# Patient Record
Sex: FEMALE | Race: WHITE | NOT HISPANIC OR LATINO
[De-identification: names, ages, dates, MRNs, and addresses within clinical notes are randomized per-mention and may not be internally consistent; named-entity substitution may affect disease eponyms.]

---

## 2017-02-09 ENCOUNTER — APPOINTMENT (OUTPATIENT)
Dept: HEART AND VASCULAR | Facility: CLINIC | Age: 57
End: 2017-02-09

## 2017-02-09 VITALS
RESPIRATION RATE: 12 BRPM | BODY MASS INDEX: 27.01 KG/M2 | OXYGEN SATURATION: 98 % | HEIGHT: 67 IN | TEMPERATURE: 97.8 F | HEART RATE: 82 BPM | DIASTOLIC BLOOD PRESSURE: 75 MMHG | WEIGHT: 172.1 LBS | SYSTOLIC BLOOD PRESSURE: 123 MMHG

## 2017-02-09 DIAGNOSIS — R00.2 PALPITATIONS: ICD-10-CM

## 2017-02-09 DIAGNOSIS — I83.93 ASYMPTOMATIC VARICOSE VEINS OF BILATERAL LOWER EXTREMITIES: ICD-10-CM

## 2017-09-06 ENCOUNTER — APPOINTMENT (OUTPATIENT)
Dept: HEART AND VASCULAR | Facility: CLINIC | Age: 57
End: 2017-09-06

## 2017-11-22 ENCOUNTER — APPOINTMENT (OUTPATIENT)
Dept: HEART AND VASCULAR | Facility: CLINIC | Age: 57
End: 2017-11-22

## 2018-07-03 ENCOUNTER — APPOINTMENT (OUTPATIENT)
Dept: HEART AND VASCULAR | Facility: CLINIC | Age: 58
End: 2018-07-03
Payer: COMMERCIAL

## 2018-07-03 VITALS
RESPIRATION RATE: 12 BRPM | HEIGHT: 69 IN | SYSTOLIC BLOOD PRESSURE: 100 MMHG | DIASTOLIC BLOOD PRESSURE: 70 MMHG | BODY MASS INDEX: 25.18 KG/M2 | OXYGEN SATURATION: 96 % | WEIGHT: 170 LBS | TEMPERATURE: 98.2 F | HEART RATE: 97 BPM

## 2018-07-03 DIAGNOSIS — I87.2 VENOUS INSUFFICIENCY (CHRONIC) (PERIPHERAL): ICD-10-CM

## 2018-07-03 PROCEDURE — 93000 ELECTROCARDIOGRAM COMPLETE: CPT

## 2018-07-03 PROCEDURE — 36415 COLL VENOUS BLD VENIPUNCTURE: CPT

## 2018-07-03 PROCEDURE — 99214 OFFICE O/P EST MOD 30 MIN: CPT | Mod: 25

## 2018-07-03 RX ORDER — METOPROLOL SUCCINATE 50 MG/1
50 TABLET, EXTENDED RELEASE ORAL DAILY
Qty: 90 | Refills: 1 | Status: DISCONTINUED | COMMUNITY
Start: 2017-02-09 | End: 2018-07-03

## 2018-07-09 ENCOUNTER — APPOINTMENT (OUTPATIENT)
Dept: HEART AND VASCULAR | Facility: CLINIC | Age: 58
End: 2018-07-09

## 2018-07-09 ENCOUNTER — APPOINTMENT (OUTPATIENT)
Dept: HEART AND VASCULAR | Facility: CLINIC | Age: 58
End: 2018-07-09
Payer: COMMERCIAL

## 2018-07-09 VITALS
WEIGHT: 170 LBS | TEMPERATURE: 98.4 F | HEART RATE: 95 BPM | SYSTOLIC BLOOD PRESSURE: 115 MMHG | RESPIRATION RATE: 12 BRPM | DIASTOLIC BLOOD PRESSURE: 80 MMHG | OXYGEN SATURATION: 97 % | BODY MASS INDEX: 33.38 KG/M2 | HEIGHT: 60 IN

## 2018-07-09 DIAGNOSIS — Z13.6 ENCOUNTER FOR SCREENING FOR CARDIOVASCULAR DISORDERS: ICD-10-CM

## 2018-07-09 LAB
25(OH)D3 SERPL-MCNC: 29.7 NG/ML
ALBUMIN SERPL ELPH-MCNC: 4 G/DL
ALP BLD-CCNC: 122 U/L
ALT SERPL-CCNC: 25 U/L
ANION GAP SERPL CALC-SCNC: 17 MMOL/L
APPEARANCE: CLEAR
AST SERPL-CCNC: 25 U/L
BASOPHILS # BLD AUTO: 0.04 K/UL
BASOPHILS NFR BLD AUTO: 0.6 %
BILIRUB SERPL-MCNC: 0.2 MG/DL
BILIRUBIN URINE: NEGATIVE
BLOOD URINE: NEGATIVE
BUN SERPL-MCNC: 15 MG/DL
CALCIUM SERPL-MCNC: 9.8 MG/DL
CHLORIDE SERPL-SCNC: 103 MMOL/L
CHOLEST SERPL-MCNC: 200 MG/DL
CHOLEST/HDLC SERPL: 4.3 RATIO
CO2 SERPL-SCNC: 22 MMOL/L
COLOR: YELLOW
CREAT SERPL-MCNC: 0.76 MG/DL
EOSINOPHIL # BLD AUTO: 0.18 K/UL
EOSINOPHIL NFR BLD AUTO: 2.6 %
FOLATE SERPL-MCNC: 15.7 NG/ML
GLUCOSE QUALITATIVE U: NEGATIVE MG/DL
GLUCOSE SERPL-MCNC: 103 MG/DL
HBA1C MFR BLD HPLC: 6 %
HCT VFR BLD CALC: 43.5 %
HDLC SERPL-MCNC: 46 MG/DL
HGB BLD-MCNC: 13.9 G/DL
IMM GRANULOCYTES NFR BLD AUTO: 0.1 %
KETONES URINE: NEGATIVE
LDLC SERPL CALC-MCNC: 121 MG/DL
LEUKOCYTE ESTERASE URINE: NEGATIVE
LYMPHOCYTES # BLD AUTO: 1.93 K/UL
LYMPHOCYTES NFR BLD AUTO: 28 %
MAGNESIUM SERPL-MCNC: 2 MG/DL
MAN DIFF?: NORMAL
MCHC RBC-ENTMCNC: 30 PG
MCHC RBC-ENTMCNC: 32 GM/DL
MCV RBC AUTO: 94 FL
MONOCYTES # BLD AUTO: 0.33 K/UL
MONOCYTES NFR BLD AUTO: 4.8 %
NEUTROPHILS # BLD AUTO: 4.41 K/UL
NEUTROPHILS NFR BLD AUTO: 63.9 %
NITRITE URINE: NEGATIVE
PH URINE: 5
PLATELET # BLD AUTO: 232 K/UL
POTASSIUM SERPL-SCNC: 4.4 MMOL/L
PROT SERPL-MCNC: 7.1 G/DL
PROTEIN URINE: NEGATIVE MG/DL
RBC # BLD: 4.63 M/UL
RBC # FLD: 12.9 %
SODIUM SERPL-SCNC: 142 MMOL/L
SPECIFIC GRAVITY URINE: 1.03
TRIGL SERPL-MCNC: 163 MG/DL
TSH SERPL-ACNC: 2.41 UIU/ML
UROBILINOGEN URINE: NEGATIVE MG/DL
VIT B12 SERPL-MCNC: 826 PG/ML
WBC # FLD AUTO: 6.9 K/UL

## 2018-07-09 PROCEDURE — 93015 CV STRESS TEST SUPVJ I&R: CPT

## 2018-07-10 PROBLEM — Z13.6 ISCHEMIC HEART DISEASE SCREEN: Status: ACTIVE | Noted: 2018-07-10

## 2018-11-14 ENCOUNTER — RX RENEWAL (OUTPATIENT)
Age: 58
End: 2018-11-14

## 2018-12-14 ENCOUNTER — RX RENEWAL (OUTPATIENT)
Age: 58
End: 2018-12-14

## 2018-12-16 ENCOUNTER — RX RENEWAL (OUTPATIENT)
Age: 58
End: 2018-12-16

## 2019-05-15 ENCOUNTER — APPOINTMENT (OUTPATIENT)
Dept: HEART AND VASCULAR | Facility: CLINIC | Age: 59
End: 2019-05-15
Payer: COMMERCIAL

## 2019-05-15 ENCOUNTER — APPOINTMENT (OUTPATIENT)
Dept: HEART AND VASCULAR | Facility: CLINIC | Age: 59
End: 2019-05-15

## 2019-05-15 ENCOUNTER — LABORATORY RESULT (OUTPATIENT)
Age: 59
End: 2019-05-15

## 2019-05-15 VITALS
HEART RATE: 129 BPM | DIASTOLIC BLOOD PRESSURE: 74 MMHG | RESPIRATION RATE: 12 BRPM | TEMPERATURE: 98 F | SYSTOLIC BLOOD PRESSURE: 118 MMHG | HEIGHT: 60 IN | OXYGEN SATURATION: 98 % | WEIGHT: 173 LBS | BODY MASS INDEX: 33.96 KG/M2

## 2019-05-15 DIAGNOSIS — R73.09 OTHER ABNORMAL GLUCOSE: ICD-10-CM

## 2019-05-15 DIAGNOSIS — R06.09 OTHER FORMS OF DYSPNEA: ICD-10-CM

## 2019-05-15 DIAGNOSIS — D50.9 IRON DEFICIENCY ANEMIA, UNSPECIFIED: ICD-10-CM

## 2019-05-15 DIAGNOSIS — R00.0 TACHYCARDIA, UNSPECIFIED: ICD-10-CM

## 2019-05-15 DIAGNOSIS — E78.5 HYPERLIPIDEMIA, UNSPECIFIED: ICD-10-CM

## 2019-05-15 PROCEDURE — 93000 ELECTROCARDIOGRAM COMPLETE: CPT

## 2019-05-15 PROCEDURE — 93306 TTE W/DOPPLER COMPLETE: CPT

## 2019-05-15 PROCEDURE — 36415 COLL VENOUS BLD VENIPUNCTURE: CPT

## 2019-05-15 PROCEDURE — 99214 OFFICE O/P EST MOD 30 MIN: CPT

## 2019-05-16 LAB
ALBUMIN SERPL ELPH-MCNC: 3.9 G/DL
ALP BLD-CCNC: 206 U/L
ALT SERPL-CCNC: 20 U/L
ANION GAP SERPL CALC-SCNC: 15 MMOL/L
APPEARANCE: CLEAR
AST SERPL-CCNC: 20 U/L
BASOPHILS # BLD AUTO: 0.05 K/UL
BASOPHILS NFR BLD AUTO: 0.5 %
BILIRUB SERPL-MCNC: 0.3 MG/DL
BILIRUBIN URINE: NEGATIVE
BLOOD URINE: NEGATIVE
BUN SERPL-MCNC: 11 MG/DL
CALCIUM SERPL-MCNC: 9.8 MG/DL
CHLORIDE SERPL-SCNC: 105 MMOL/L
CHOLEST SERPL-MCNC: 193 MG/DL
CHOLEST/HDLC SERPL: 3.4 RATIO
CO2 SERPL-SCNC: 22 MMOL/L
COLOR: NORMAL
CREAT SERPL-MCNC: 0.64 MG/DL
CREAT SPEC-SCNC: 40 MG/DL
EOSINOPHIL # BLD AUTO: 0.09 K/UL
EOSINOPHIL NFR BLD AUTO: 0.8 %
ESTIMATED AVERAGE GLUCOSE: 114 MG/DL
FERRITIN SERPL-MCNC: 3964 NG/ML
FOLATE SERPL-MCNC: 10.8 NG/ML
GLUCOSE QUALITATIVE U: NEGATIVE
GLUCOSE SERPL-MCNC: 157 MG/DL
HBA1C MFR BLD HPLC: 5.6 %
HCT VFR BLD CALC: 46.2 %
HDLC SERPL-MCNC: 57 MG/DL
HGB BLD-MCNC: 13.5 G/DL
IMM GRANULOCYTES NFR BLD AUTO: 0.4 %
IRON SATN MFR SERPL: 40 %
IRON SERPL-MCNC: 80 UG/DL
KETONES URINE: NEGATIVE
LDLC SERPL CALC-MCNC: 114 MG/DL
LEUKOCYTE ESTERASE URINE: NEGATIVE
LYMPHOCYTES # BLD AUTO: 1.64 K/UL
LYMPHOCYTES NFR BLD AUTO: 14.8 %
MAN DIFF?: NORMAL
MCHC RBC-ENTMCNC: 29.2 GM/DL
MCHC RBC-ENTMCNC: 30.3 PG
MCV RBC AUTO: 103.6 FL
MICROALBUMIN 24H UR DL<=1MG/L-MCNC: <1.2 MG/DL
MICROALBUMIN/CREAT 24H UR-RTO: NORMAL MG/G
MONOCYTES # BLD AUTO: 0.66 K/UL
MONOCYTES NFR BLD AUTO: 5.9 %
MYOGLOBIN SERPL-MCNC: <21 NG/ML
NEUTROPHILS # BLD AUTO: 8.63 K/UL
NEUTROPHILS NFR BLD AUTO: 77.6 %
NITRITE URINE: NEGATIVE
PH URINE: 6
PLATELET # BLD AUTO: 295 K/UL
POTASSIUM SERPL-SCNC: 4.5 MMOL/L
PROT SERPL-MCNC: 7.1 G/DL
PROTEIN URINE: NEGATIVE
RBC # BLD: 4.46 M/UL
RBC # FLD: 20.1 %
SODIUM SERPL-SCNC: 142 MMOL/L
SPECIFIC GRAVITY URINE: 1.01
TIBC SERPL-MCNC: 198 UG/DL
TRIGL SERPL-MCNC: 109 MG/DL
TSH SERPL-ACNC: 1.78 UIU/ML
UIBC SERPL-MCNC: 118 UG/DL
UROBILINOGEN URINE: NORMAL
VIT B12 SERPL-MCNC: 1479 PG/ML
WBC # FLD AUTO: 11.11 K/UL

## 2019-05-17 ENCOUNTER — APPOINTMENT (OUTPATIENT)
Dept: HEART AND VASCULAR | Facility: CLINIC | Age: 59
End: 2019-05-17
Payer: COMMERCIAL

## 2019-05-17 VITALS
WEIGHT: 173 LBS | SYSTOLIC BLOOD PRESSURE: 112 MMHG | HEART RATE: 127 BPM | RESPIRATION RATE: 12 BRPM | TEMPERATURE: 98 F | HEIGHT: 67 IN | OXYGEN SATURATION: 96 % | DIASTOLIC BLOOD PRESSURE: 74 MMHG | BODY MASS INDEX: 27.15 KG/M2

## 2019-05-17 DIAGNOSIS — F98.8 OTHER SPECIFIED BEHAVIORAL AND EMOTIONAL DISORDERS WITH ONSET USUALLY OCCURRING IN CHILDHOOD AND ADOLESCENCE: ICD-10-CM

## 2019-05-17 DIAGNOSIS — R53.83 OTHER FATIGUE: ICD-10-CM

## 2019-05-17 DIAGNOSIS — R00.0 TACHYCARDIA, UNSPECIFIED: ICD-10-CM

## 2019-05-17 DIAGNOSIS — R79.89 OTHER SPECIFIED ABNORMAL FINDINGS OF BLOOD CHEMISTRY: ICD-10-CM

## 2019-05-17 DIAGNOSIS — M79.10 MYALGIA, UNSPECIFIED SITE: ICD-10-CM

## 2019-05-17 PROCEDURE — 99214 OFFICE O/P EST MOD 30 MIN: CPT

## 2019-05-17 RX ORDER — METOPROLOL SUCCINATE 25 MG/1
25 TABLET, EXTENDED RELEASE ORAL
Qty: 90 | Refills: 1 | Status: ACTIVE | COMMUNITY
Start: 2019-05-17 | End: 1900-01-01

## 2019-05-17 RX ORDER — PREDNISONE 20 MG/1
20 TABLET ORAL
Qty: 5 | Refills: 0 | Status: ACTIVE | COMMUNITY
Start: 2019-05-17 | End: 1900-01-01

## 2019-05-19 PROBLEM — R00.0 SINUS TACHYCARDIA: Status: ACTIVE | Noted: 2019-05-19

## 2019-05-19 LAB
CK BB SERPL ELPH-CCNC: 0 % (ref 0–?)
CK MB CFR SERPL ELPH: 0 %
CK MM SERPL ELPH-CCNC: 100 %
CREATINE KINASE,TOTAL,SERUM: 21 U/L
MACRO TYPE 1: 0 %
MACRO TYPE 2: 0 %

## 2019-05-19 NOTE — REASON FOR VISIT
[Follow-Up - Clinic] : a clinic follow-up of [Abnormal ECG] : an abnormal ECG [Fatigue] : feeling tired (fatigue) [FreeTextEntry1] : 59   year old [place] female with prior smoking hx and premature, surgical menopause at age 27 has  history  of palpitations .  Her  is doing well two years post bone marrow transplant for myelofibrosis. \par \par \par While there is family hx of CAD, it is not premature.\par \par She was doing well on  metoprolol  but stopped  taking it.  No longer having palpitations. \par \par Has hyperlipidemia and prediabetes \par \par \par

## 2019-05-19 NOTE — DISCUSSION/SUMMARY
[Non-specific ECG Changes] : abnormal ECG [Deteriorating] : deteriorating [FreeTextEntry1] : Venipuncture performed   with iron studies \par \par Increase hydration \par \par Echocardiogram shows  no evidence of  RV dilation or pulmonary hypertension or significant valvular pathology \par \par additional recommendations ending results of blood tests

## 2019-05-19 NOTE — ASSESSMENT
[FreeTextEntry1] : Sinus tachycardia \par \par diffuse body pains \par \par hx of recent anemia \par \par

## 2019-05-19 NOTE — HISTORY OF PRESENT ILLNESS
[FreeTextEntry1] : Within past three months she had a total right hip replacement for chronic pain and  large bone cyst (apparently benign)  in  [facility] .   Her post op course complicated by anemia  requiring iron infusions \par \par She has no fevers, chills or significant hip pain but has diffuse body aches  and fatigue  and has been taking  NSAIDs and tylenol for pain management \par \par Resting EKG shows  sinus tachycardia  122 bpm  without ectopy , ischemia or LVH . there is normal axis and intervals \par \par She denies bleeding from any orifices or black tarry stools \par \par She denies chest pains, dyspnea, syncope, orthopnea

## 2019-05-19 NOTE — PHYSICAL EXAM
[General Appearance - Well Developed] : well developed [Normal Appearance] : normal appearance [Well Groomed] : well groomed [General Appearance - Well Nourished] : well nourished [No Deformities] : no deformities [General Appearance - In No Acute Distress] : no acute distress [Normal Conjunctiva] : the conjunctiva exhibited no abnormalities [Eyelids - No Xanthelasma] : the eyelids demonstrated no xanthelasmas [No Oral Cyanosis] : no oral cyanosis [Normal Jugular Venous A Waves Present] : normal jugular venous A waves present [Normal Jugular Venous V Waves Present] : normal jugular venous V waves present [No Jugular Venous Rodgers A Waves] : no jugular venous rodgers A waves [Respiration, Rhythm And Depth] : normal respiratory rhythm and effort [Exaggerated Use Of Accessory Muscles For Inspiration] : no accessory muscle use [Auscultation Breath Sounds / Voice Sounds] : lungs were clear to auscultation bilaterally [Heart Rate And Rhythm] : heart rate and rhythm were normal [Heart Sounds] : normal S1 and S2 [Murmurs] : no murmurs present [Arterial Pulses Normal] : the arterial pulses were normal [Edema] : no peripheral edema present [Veins - Varicosity Changes] : no varicosital changes were noted in the lower extremities [Abnormal Walk] : normal gait [Gait - Sufficient For Exercise Testing] : the gait was sufficient for exercise testing [FreeTextEntry1] : surgical scar right hip  [Nail Clubbing] : no clubbing of the fingernails [Cyanosis, Localized] : no localized cyanosis [Petechial Hemorrhages (___cm)] : no petechial hemorrhages [Nail Splinter Hemorrhages] : no splinter hemorrhages of the nails [Fingers Osler's Nodes] : Osler's nodes were not seenon the fingers [Skin Color & Pigmentation] : normal skin color and pigmentation [Skin Turgor] : normal skin turgor [] : no rash [No Venous Stasis] : no venous stasis [Skin Lesions] : no skin lesions [No Skin Ulcers] : no skin ulcer [No Xanthoma] : no  xanthoma was observed [Oriented To Time, Place, And Person] : oriented to person, place, and time [Impaired Insight] : insight and judgment were intact [Affect] : the affect was normal [Mood] : the mood was normal

## 2019-05-19 NOTE — REVIEW OF SYSTEMS
[Shortness Of Breath] : no shortness of breath [Dyspnea on exertion] : not dyspnea during exertion [Chest  Pressure] : no chest pressure [Chest Pain] : no chest pain [Lower Ext Edema] : no extremity edema [Leg Claudication] : no intermittent leg claudication [Palpitations] : no palpitations [Abdominal Pain] : abdominal pain [Negative] : Heme/Lymph

## 2019-05-24 PROBLEM — M79.10 MYALGIA: Status: ACTIVE | Noted: 2019-05-19

## 2019-05-24 NOTE — DISCUSSION/SUMMARY
[FreeTextEntry1] : refer to hematologist , Fer Miller MD   evaluate and manage cause of markedly elevated ferritin \par acute phase reactant ?  \par \par Hydrate well \par \par resume metoprolol ER 25mg po qd for rate control \par \par Pulsed steroids to manage severe myalgias, aches

## 2019-05-24 NOTE — REVIEW OF SYSTEMS
[Feeling Fatigued] : feeling fatigued [Shortness Of Breath] : no shortness of breath [Dyspnea on exertion] : not dyspnea during exertion [Chest  Pressure] : no chest pressure [Chest Pain] : no chest pain [Lower Ext Edema] : no extremity edema [Leg Claudication] : no intermittent leg claudication [Palpitations] : no palpitations [Abdominal Pain] : no abdominal pain [Muscle Cramps] : muscle cramps [Negative] : Heme/Lymph

## 2019-05-24 NOTE — REASON FOR VISIT
[Follow-Up - Clinic] : a clinic follow-up of [Fatigue] : feeling tired (fatigue) [FreeTextEntry1] : 59   year old [place] female with prior smoking hx and premature, surgical menopause at age 27 has  history  of palpitations .  Her  is doing well two years post bone marrow transplant for myelofibrosis. \par \par \par While there is family hx of CAD, it is not premature.\par \par She was doing well on  metoprolol  but stopped  taking it.  No longer having palpitations. \par \par Has hyperlipidemia and prediabetes \par \par \par

## 2019-05-24 NOTE — ASSESSMENT
[FreeTextEntry1] : Unexplained myalgias  with markedly elevated ferritin , slightly elevated WBC\par \par

## 2019-05-24 NOTE — HISTORY OF PRESENT ILLNESS
[FreeTextEntry1] : Recent sinus tachycardia and persistent body aches without  fevers.  \par \par recent blood test showed no anemia  but ferritin   levels were very high  and myoglobin level was normal \par \par No clear explanation exists    \par \par recent right total hip replacement is not painful, red, swollen \par \par No localizing signs of infections detected

## 2019-06-04 ENCOUNTER — EMERGENCY (EMERGENCY)
Facility: HOSPITAL | Age: 59
LOS: 1 days | Discharge: HOME | End: 2019-06-04
Attending: EMERGENCY MEDICINE | Admitting: EMERGENCY MEDICINE
Payer: COMMERCIAL

## 2019-06-04 VITALS
DIASTOLIC BLOOD PRESSURE: 94 MMHG | SYSTOLIC BLOOD PRESSURE: 135 MMHG | TEMPERATURE: 98 F | HEIGHT: 67 IN | OXYGEN SATURATION: 97 % | HEART RATE: 123 BPM | WEIGHT: 169.32 LBS | RESPIRATION RATE: 18 BRPM

## 2019-06-04 VITALS
OXYGEN SATURATION: 97 % | TEMPERATURE: 98 F | DIASTOLIC BLOOD PRESSURE: 95 MMHG | RESPIRATION RATE: 18 BRPM | SYSTOLIC BLOOD PRESSURE: 145 MMHG | HEART RATE: 122 BPM

## 2019-06-04 DIAGNOSIS — Z87.19 PERSONAL HISTORY OF OTHER DISEASES OF THE DIGESTIVE SYSTEM: Chronic | ICD-10-CM

## 2019-06-04 LAB
ALBUMIN SERPL ELPH-MCNC: 3.5 G/DL — SIGNIFICANT CHANGE UP (ref 3.3–5)
ALP SERPL-CCNC: 208 U/L — HIGH (ref 40–120)
ALT FLD-CCNC: 25 U/L — SIGNIFICANT CHANGE UP (ref 10–45)
ANION GAP SERPL CALC-SCNC: 14 MMOL/L — SIGNIFICANT CHANGE UP (ref 5–17)
APPEARANCE UR: CLEAR — SIGNIFICANT CHANGE UP
AST SERPL-CCNC: 20 U/L — SIGNIFICANT CHANGE UP (ref 10–40)
BASOPHILS # BLD AUTO: 0.05 K/UL — SIGNIFICANT CHANGE UP (ref 0–0.2)
BASOPHILS NFR BLD AUTO: 0.4 % — SIGNIFICANT CHANGE UP (ref 0–2)
BILIRUB SERPL-MCNC: 0.2 MG/DL — SIGNIFICANT CHANGE UP (ref 0.2–1.2)
BILIRUB UR-MCNC: NEGATIVE — SIGNIFICANT CHANGE UP
BUN SERPL-MCNC: 11 MG/DL — SIGNIFICANT CHANGE UP (ref 7–23)
CALCIUM SERPL-MCNC: 9.9 MG/DL — SIGNIFICANT CHANGE UP (ref 8.4–10.5)
CHLORIDE SERPL-SCNC: 104 MMOL/L — SIGNIFICANT CHANGE UP (ref 96–108)
CO2 SERPL-SCNC: 25 MMOL/L — SIGNIFICANT CHANGE UP (ref 22–31)
COLOR SPEC: YELLOW — SIGNIFICANT CHANGE UP
CREAT SERPL-MCNC: 0.69 MG/DL — SIGNIFICANT CHANGE UP (ref 0.5–1.3)
DIFF PNL FLD: NEGATIVE — SIGNIFICANT CHANGE UP
EOSINOPHIL # BLD AUTO: 0.08 K/UL — SIGNIFICANT CHANGE UP (ref 0–0.5)
EOSINOPHIL NFR BLD AUTO: 0.7 % — SIGNIFICANT CHANGE UP (ref 0–6)
EXTRA BLUE TOP TUBE: SIGNIFICANT CHANGE UP
EXTRA SST TUBE: SIGNIFICANT CHANGE UP
GLUCOSE SERPL-MCNC: 113 MG/DL — HIGH (ref 70–99)
GLUCOSE UR QL: NEGATIVE — SIGNIFICANT CHANGE UP
HCT VFR BLD CALC: 42 % — SIGNIFICANT CHANGE UP (ref 34.5–45)
HGB BLD-MCNC: 13 G/DL — SIGNIFICANT CHANGE UP (ref 11.5–15.5)
IMM GRANULOCYTES NFR BLD AUTO: 0.4 % — SIGNIFICANT CHANGE UP (ref 0–1.5)
KETONES UR-MCNC: NEGATIVE — SIGNIFICANT CHANGE UP
LEUKOCYTE ESTERASE UR-ACNC: NEGATIVE — SIGNIFICANT CHANGE UP
LYMPHOCYTES # BLD AUTO: 1.65 K/UL — SIGNIFICANT CHANGE UP (ref 1–3.3)
LYMPHOCYTES # BLD AUTO: 14.4 % — SIGNIFICANT CHANGE UP (ref 13–44)
MCHC RBC-ENTMCNC: 29.5 PG — SIGNIFICANT CHANGE UP (ref 27–34)
MCHC RBC-ENTMCNC: 31 GM/DL — LOW (ref 32–36)
MCV RBC AUTO: 95.2 FL — SIGNIFICANT CHANGE UP (ref 80–100)
MONOCYTES # BLD AUTO: 0.79 K/UL — SIGNIFICANT CHANGE UP (ref 0–0.9)
MONOCYTES NFR BLD AUTO: 6.9 % — SIGNIFICANT CHANGE UP (ref 2–14)
NEUTROPHILS # BLD AUTO: 8.87 K/UL — HIGH (ref 1.8–7.4)
NEUTROPHILS NFR BLD AUTO: 77.2 % — HIGH (ref 43–77)
NITRITE UR-MCNC: NEGATIVE — SIGNIFICANT CHANGE UP
NRBC # BLD: 0 /100 WBCS — SIGNIFICANT CHANGE UP (ref 0–0)
PH UR: 7 — SIGNIFICANT CHANGE UP (ref 5–8)
PLATELET # BLD AUTO: 292 K/UL — SIGNIFICANT CHANGE UP (ref 150–400)
POTASSIUM SERPL-MCNC: 4.3 MMOL/L — SIGNIFICANT CHANGE UP (ref 3.5–5.3)
POTASSIUM SERPL-SCNC: 4.3 MMOL/L — SIGNIFICANT CHANGE UP (ref 3.5–5.3)
PROT SERPL-MCNC: 8 G/DL — SIGNIFICANT CHANGE UP (ref 6–8.3)
PROT UR-MCNC: NEGATIVE MG/DL — SIGNIFICANT CHANGE UP
RBC # BLD: 4.41 M/UL — SIGNIFICANT CHANGE UP (ref 3.8–5.2)
RBC # FLD: 17.2 % — HIGH (ref 10.3–14.5)
SODIUM SERPL-SCNC: 143 MMOL/L — SIGNIFICANT CHANGE UP (ref 135–145)
SP GR SPEC: <=1.005 — SIGNIFICANT CHANGE UP (ref 1–1.03)
UROBILINOGEN FLD QL: 0.2 E.U./DL — SIGNIFICANT CHANGE UP
WBC # BLD: 11.49 K/UL — HIGH (ref 3.8–10.5)
WBC # FLD AUTO: 11.49 K/UL — HIGH (ref 3.8–10.5)

## 2019-06-04 PROCEDURE — 36415 COLL VENOUS BLD VENIPUNCTURE: CPT

## 2019-06-04 PROCEDURE — 74177 CT ABD & PELVIS W/CONTRAST: CPT

## 2019-06-04 PROCEDURE — 80053 COMPREHEN METABOLIC PANEL: CPT

## 2019-06-04 PROCEDURE — 85025 COMPLETE CBC W/AUTO DIFF WBC: CPT

## 2019-06-04 PROCEDURE — 83690 ASSAY OF LIPASE: CPT

## 2019-06-04 PROCEDURE — 99284 EMERGENCY DEPT VISIT MOD MDM: CPT | Mod: 25

## 2019-06-04 PROCEDURE — 99284 EMERGENCY DEPT VISIT MOD MDM: CPT

## 2019-06-04 PROCEDURE — 81003 URINALYSIS AUTO W/O SCOPE: CPT

## 2019-06-04 PROCEDURE — 74177 CT ABD & PELVIS W/CONTRAST: CPT | Mod: 26

## 2019-06-04 RX ORDER — DOCUSATE SODIUM 100 MG
1 CAPSULE ORAL
Qty: 14 | Refills: 0
Start: 2019-06-04 | End: 2019-06-10

## 2019-06-04 RX ORDER — SODIUM CHLORIDE 9 MG/ML
1000 INJECTION INTRAMUSCULAR; INTRAVENOUS; SUBCUTANEOUS ONCE
Refills: 0 | Status: COMPLETED | OUTPATIENT
Start: 2019-06-04 | End: 2019-06-04

## 2019-06-04 RX ORDER — IOHEXOL 300 MG/ML
30 INJECTION, SOLUTION INTRAVENOUS ONCE
Refills: 0 | Status: COMPLETED | OUTPATIENT
Start: 2019-06-04 | End: 2019-06-04

## 2019-06-04 RX ORDER — POLYETHYLENE GLYCOL 3350 17 G/17G
17 POWDER, FOR SOLUTION ORAL
Qty: 7 | Refills: 0
Start: 2019-06-04 | End: 2019-06-10

## 2019-06-04 RX ADMIN — SODIUM CHLORIDE 1000 MILLILITER(S): 9 INJECTION INTRAMUSCULAR; INTRAVENOUS; SUBCUTANEOUS at 13:05

## 2019-06-04 RX ADMIN — IOHEXOL 30 MILLILITER(S): 300 INJECTION, SOLUTION INTRAVENOUS at 13:05

## 2019-06-04 NOTE — ED PROVIDER NOTE - CLINICAL SUMMARY MEDICAL DECISION MAKING FREE TEXT BOX
ED course unremarkable - afebrile and hemodynamically stable. Her abdomen is soft and nontender today. Contacted Dr. Teague's office and left message with RN to discuss pt care. CBC, CMP, lipase, UA unremarkable. CTAP notable for No bowel obstruction. Large amount of stool in the colon. Destructive bone lesions left posterior chest wall and left T10 vertebral body pedicle with epidural invasion. Lytic lesion left iliac bone. Upper abdominal pelvic and mesenteric adenopathy. Right pelvic retroperitoneal nodule. Overall findings compatible with metastatic disease or less likely lymphoma. Discussed results with pt and her . Will provide rx for bowel regimen and short course of percocet for pain at night. We discussed that this could worsen her constipation and to use sparingly. There is no acute indication for admission. She has close follow up with PMD and Heme/Onc. Strict return precautions given. ED course unremarkable - she is tachycardic, otherwise hemodynamically stable. Her abdomen is soft and nontender today. Contacted Dr. Teague's office and left message with RN to discuss pt care. CBC, CMP, lipase, UA unremarkable. CTAP notable for No bowel obstruction. Large amount of stool in the colon. Destructive bone lesions left posterior chest wall and left T10 vertebral body pedicle with epidural invasion. Lytic lesion left iliac bone. Upper abdominal pelvic and mesenteric adenopathy. Right pelvic retroperitoneal nodule. Overall findings compatible with metastatic disease or less likely lymphoma. Discussed results with pt and her . Will provide rx for bowel regimen and short course of percocet for pain at night. We discussed that this could worsen her constipation and to use sparingly. There is no acute indication for admission. She has close follow up with PMD and Heme/Onc. Strict return precautions given. Of note, pt tachycardic on discharge VS. She is asymptomatic. She states she carries diagnosis of tachycardia and is supposed to take metoprolol, but forgot her dose. Will take once home.

## 2019-06-04 NOTE — ED ADULT TRIAGE NOTE - OTHER COMPLAINTS
Patient c/o generalized muscle pain and abdominal pain x1 month. Reports last BM was 3 days ago and reports vomiting today.

## 2019-06-04 NOTE — ED PROVIDER NOTE - OBJECTIVE STATEMENT
60yo female with pmhx of SBO, s/p right LISA in Feb 2019 presents with 4 mo of migrating myalgias and 2wk of abdominal pain and constipation. Pt reports she has migratory pain over her extremities and torso, has seen her PMD Dr. Crain and Dr. Teague with [facility] regarding this pain, states her lab work up so far has been normal. Now endorsing 2 wk of vise-like pain in her epigastric region associated with distension and constipation, having small and hard BMs.  Last BM 2 days ago but she is passing flatus. States she had BRB on toilet paper when wiping today after straining d/t known hemorrhoids. She denies fever, chills, chest pain, shortness of breath, vomiting, urinary symptoms. Sent for CT abdomen/pelvis by Dr. Martin.

## 2019-06-04 NOTE — ED PROVIDER NOTE - NSFOLLOWUPINSTRUCTIONS_ED_ALL_ED_FT
Continue ibuprofen 600mg three times daily with food for pain. You may use percocet at nighttime for pain. This could make your constipation worse.    Take one tab of colace twice daily to soften stool. Take one capful of miralax nightly as laxative.   Drink plenty of water and increase fiber in your diet.    Follow up with your Hematologist/Oncologist as soon as possible to discuss your results.    Return to the Emergency Department if you develop worsening abdominal pain, inability to have a bowel movement, vomiting or any other concerns.

## 2019-06-04 NOTE — ED PROVIDER NOTE - NS ED ROS FT
Constitutional: No fever. No chills.  Eyes: No redness. No discharge. No vision change.   ENT: No sore throat. No ear pain.  Cardiovascular: No chest pain. No leg swelling.  Respiratory: No cough. No shortness of breath.  GI: +abdominal pain. No vomiting. No diarrhea. +constipation.   MSK: No joint pain. No back pain. +body aches.   Skin: No rash. No abrasions.   Neuro: No numbness. No weakness.   Psych: No known mental health issues.

## 2019-06-04 NOTE — ED ADULT NURSE NOTE - OBJECTIVE STATEMENT
Pt came to ER with c/o pain mostly to upper torso and states that he doctor is working her up but was sent to ER for Ct scan of chest, abd and pelvic with IV contrast.

## 2019-06-04 NOTE — ED ADULT NURSE REASSESSMENT NOTE - NS ED NURSE REASSESS COMMENT FT1
Noted with HR of 122 bpm , DR Chely Eduardo made aware evaluated the patient , cleared for d/c. Left in stable condition .

## 2019-06-04 NOTE — ED PROVIDER NOTE - PHYSICAL EXAMINATION
VITAL SIGNS: I have reviewed nursing notes and confirm.  CONSTITUTIONAL: Well-developed; well-nourished; in no acute distress.   SKIN:  warm and dry, no acute rash.   HEAD:  normocephalic, atraumatic.  EYES: PERRL, EOM intact; conjunctiva and sclera clear.  ENT: No nasal discharge; airway clear.   NECK: Supple; non tender.  CARD: S1, S2 normal; no murmurs, gallops, or rubs. Regular rate and rhythm.   RESP:  Clear to auscultation b/l, no wheezes, rales or rhonchi.  ABD: Hypoactive bowel sounds; soft; mild distension; non-tender; no guarding/ rebound.  EXT: Normal ROM. No clubbing, cyanosis or edema. 2+ pulses to b/l ue/le.  NEURO: Alert, oriented, grossly unremarkable. Sensation equal and intact in all extremities. 5/5 strength in all extremities.   PSYCH: Cooperative, mood and affect appropriate.

## 2019-06-04 NOTE — ED ADULT NURSE NOTE - NSIMPLEMENTINTERV_GEN_ALL_ED
Implemented All Universal Safety Interventions:  [place] to call system. Call bell, personal items and telephone within reach. Instruct patient to call for assistance. Room bathroom lighting operational. Non-slip footwear when patient is off stretcher. Physically safe environment: no spills, clutter or unnecessary equipment. Stretcher in lowest position, wheels locked, appropriate side rails in place.

## 2019-06-04 NOTE — ED ADULT TRIAGE NOTE - CHIEF COMPLAINT QUOTE
"I have a lot of pain in my muscles. They're doing tests and some of them are high. The doctor said I need a CT scan."

## 2019-06-04 NOTE — ED PROVIDER NOTE - ATTENDING CONTRIBUTION TO CARE
58 yo F hx of SBO, s/p THR in Feb 2019 presenting with 2 weeks of progressively worsening epigastric pain and constipation with flatus. 58 yo F hx of SBO, s/p THR in Feb 2019 presenting with 2 weeks of progressively worsening epigastric pain and constipation with flatus.  Pt with soft belly, mild ttp, no r/g.  HR elevated, afebrile.  Plan labs, CT, fluids and reassess.

## 2019-06-08 DIAGNOSIS — R14.0 ABDOMINAL DISTENSION (GASEOUS): ICD-10-CM

## 2019-06-08 DIAGNOSIS — K59.00 CONSTIPATION, UNSPECIFIED: ICD-10-CM

## 2019-06-08 DIAGNOSIS — R10.9 UNSPECIFIED ABDOMINAL PAIN: ICD-10-CM

## 2019-06-08 DIAGNOSIS — M85.80 OTHER SPECIFIED DISORDERS OF BONE DENSITY AND STRUCTURE, UNSPECIFIED SITE: ICD-10-CM

## 2019-06-08 DIAGNOSIS — R10.13 EPIGASTRIC PAIN: ICD-10-CM

## 2019-06-09 ENCOUNTER — INPATIENT (INPATIENT)
Facility: HOSPITAL | Age: 59
LOS: 11 days | Discharge: HOME | DRG: 457 | End: 2019-06-21
Attending: ORTHOPAEDIC SURGERY | Admitting: ORTHOPAEDIC SURGERY
Payer: COMMERCIAL

## 2019-06-09 VITALS
SYSTOLIC BLOOD PRESSURE: 128 MMHG | OXYGEN SATURATION: 98 % | RESPIRATION RATE: 15 BRPM | TEMPERATURE: 98 F | WEIGHT: 169.98 LBS | HEART RATE: 119 BPM | DIASTOLIC BLOOD PRESSURE: 87 MMHG

## 2019-06-09 DIAGNOSIS — Z87.19 PERSONAL HISTORY OF OTHER DISEASES OF THE DIGESTIVE SYSTEM: Chronic | ICD-10-CM

## 2019-06-09 PROBLEM — Z78.9 OTHER SPECIFIED HEALTH STATUS: Chronic | Status: ACTIVE | Noted: 2019-06-04

## 2019-06-09 LAB
ALBUMIN SERPL ELPH-MCNC: 3.3 G/DL — SIGNIFICANT CHANGE UP (ref 3.3–5)
ALP SERPL-CCNC: SIGNIFICANT CHANGE UP U/L (ref 40–120)
ALT FLD-CCNC: SIGNIFICANT CHANGE UP U/L (ref 10–45)
ANION GAP SERPL CALC-SCNC: 13 MMOL/L — SIGNIFICANT CHANGE UP (ref 5–17)
APPEARANCE UR: CLEAR — SIGNIFICANT CHANGE UP
APTT BLD: 33.8 SEC — SIGNIFICANT CHANGE UP (ref 27.5–36.3)
AST SERPL-CCNC: SIGNIFICANT CHANGE UP U/L (ref 10–40)
BASOPHILS # BLD AUTO: 0.05 K/UL — SIGNIFICANT CHANGE UP (ref 0–0.2)
BASOPHILS NFR BLD AUTO: 0.4 % — SIGNIFICANT CHANGE UP (ref 0–2)
BILIRUB SERPL-MCNC: 0.3 MG/DL — SIGNIFICANT CHANGE UP (ref 0.2–1.2)
BILIRUB UR-MCNC: NEGATIVE — SIGNIFICANT CHANGE UP
BUN SERPL-MCNC: 6 MG/DL — LOW (ref 7–23)
CALCIUM SERPL-MCNC: 9.9 MG/DL — SIGNIFICANT CHANGE UP (ref 8.4–10.5)
CHLORIDE SERPL-SCNC: 97 MMOL/L — SIGNIFICANT CHANGE UP (ref 96–108)
CO2 SERPL-SCNC: 24 MMOL/L — SIGNIFICANT CHANGE UP (ref 22–31)
COLOR SPEC: YELLOW — SIGNIFICANT CHANGE UP
CREAT SERPL-MCNC: 0.61 MG/DL — SIGNIFICANT CHANGE UP (ref 0.5–1.3)
DIFF PNL FLD: ABNORMAL
EOSINOPHIL # BLD AUTO: 0.07 K/UL — SIGNIFICANT CHANGE UP (ref 0–0.5)
EOSINOPHIL NFR BLD AUTO: 0.6 % — SIGNIFICANT CHANGE UP (ref 0–6)
GLUCOSE SERPL-MCNC: 139 MG/DL — HIGH (ref 70–99)
GLUCOSE UR QL: NEGATIVE — SIGNIFICANT CHANGE UP
HCT VFR BLD CALC: 43 % — SIGNIFICANT CHANGE UP (ref 34.5–45)
HGB BLD-MCNC: 13.5 G/DL — SIGNIFICANT CHANGE UP (ref 11.5–15.5)
IMM GRANULOCYTES NFR BLD AUTO: 0.5 % — SIGNIFICANT CHANGE UP (ref 0–1.5)
INR BLD: 1.24 — HIGH (ref 0.88–1.16)
KETONES UR-MCNC: NEGATIVE — SIGNIFICANT CHANGE UP
LEUKOCYTE ESTERASE UR-ACNC: NEGATIVE — SIGNIFICANT CHANGE UP
LYMPHOCYTES # BLD AUTO: 1.64 K/UL — SIGNIFICANT CHANGE UP (ref 1–3.3)
LYMPHOCYTES # BLD AUTO: 13.1 % — SIGNIFICANT CHANGE UP (ref 13–44)
MCHC RBC-ENTMCNC: 30.2 PG — SIGNIFICANT CHANGE UP (ref 27–34)
MCHC RBC-ENTMCNC: 31.4 GM/DL — LOW (ref 32–36)
MCV RBC AUTO: 96.2 FL — SIGNIFICANT CHANGE UP (ref 80–100)
MONOCYTES # BLD AUTO: 0.85 K/UL — SIGNIFICANT CHANGE UP (ref 0–0.9)
MONOCYTES NFR BLD AUTO: 6.8 % — SIGNIFICANT CHANGE UP (ref 2–14)
MRSA PCR RESULT.: NEGATIVE — SIGNIFICANT CHANGE UP
NEUTROPHILS # BLD AUTO: 9.87 K/UL — HIGH (ref 1.8–7.4)
NEUTROPHILS NFR BLD AUTO: 78.6 % — HIGH (ref 43–77)
NITRITE UR-MCNC: NEGATIVE — SIGNIFICANT CHANGE UP
NRBC # BLD: 0 /100 WBCS — SIGNIFICANT CHANGE UP (ref 0–0)
PH UR: 7 — SIGNIFICANT CHANGE UP (ref 5–8)
PLATELET # BLD AUTO: 342 K/UL — SIGNIFICANT CHANGE UP (ref 150–400)
POTASSIUM SERPL-MCNC: SIGNIFICANT CHANGE UP MMOL/L (ref 3.5–5.3)
POTASSIUM SERPL-SCNC: SIGNIFICANT CHANGE UP MMOL/L (ref 3.5–5.3)
PROT SERPL-MCNC: 8.3 G/DL — SIGNIFICANT CHANGE UP (ref 6–8.3)
PROT UR-MCNC: NEGATIVE MG/DL — SIGNIFICANT CHANGE UP
PROTHROM AB SERPL-ACNC: 14.1 SEC — HIGH (ref 10–12.9)
RBC # BLD: 4.47 M/UL — SIGNIFICANT CHANGE UP (ref 3.8–5.2)
RBC # FLD: 16.7 % — HIGH (ref 10.3–14.5)
S AUREUS DNA NOSE QL NAA+PROBE: NEGATIVE — SIGNIFICANT CHANGE UP
SODIUM SERPL-SCNC: 134 MMOL/L — LOW (ref 135–145)
SP GR SPEC: <=1.005 — SIGNIFICANT CHANGE UP (ref 1–1.03)
UROBILINOGEN FLD QL: 0.2 E.U./DL — SIGNIFICANT CHANGE UP
WBC # BLD: 12.54 K/UL — HIGH (ref 3.8–10.5)
WBC # FLD AUTO: 12.54 K/UL — HIGH (ref 3.8–10.5)

## 2019-06-09 PROCEDURE — 99255 IP/OBS CONSLTJ NEW/EST HI 80: CPT | Mod: GC

## 2019-06-09 PROCEDURE — 93010 ELECTROCARDIOGRAM REPORT: CPT | Mod: 76

## 2019-06-09 PROCEDURE — 71045 X-RAY EXAM CHEST 1 VIEW: CPT | Mod: 26

## 2019-06-09 PROCEDURE — 99285 EMERGENCY DEPT VISIT HI MDM: CPT

## 2019-06-09 RX ORDER — OXYCODONE HYDROCHLORIDE 5 MG/1
10 TABLET ORAL EVERY 12 HOURS
Refills: 0 | Status: DISCONTINUED | OUTPATIENT
Start: 2019-06-09 | End: 2019-06-10

## 2019-06-09 RX ORDER — OXYCODONE HYDROCHLORIDE 5 MG/1
5 TABLET ORAL EVERY 4 HOURS
Refills: 0 | Status: DISCONTINUED | OUTPATIENT
Start: 2019-06-09 | End: 2019-06-11

## 2019-06-09 RX ORDER — HYDROMORPHONE HYDROCHLORIDE 2 MG/ML
0.5 INJECTION INTRAMUSCULAR; INTRAVENOUS; SUBCUTANEOUS EVERY 6 HOURS
Refills: 0 | Status: DISCONTINUED | OUTPATIENT
Start: 2019-06-09 | End: 2019-06-10

## 2019-06-09 RX ORDER — FAMOTIDINE 10 MG/ML
20 INJECTION INTRAVENOUS EVERY 12 HOURS
Refills: 0 | Status: DISCONTINUED | OUTPATIENT
Start: 2019-06-09 | End: 2019-06-21

## 2019-06-09 RX ORDER — ATOMOXETINE HYDROCHLORIDE 10 MG/1
60 CAPSULE ORAL DAILY
Refills: 0 | Status: DISCONTINUED | OUTPATIENT
Start: 2019-06-10 | End: 2019-06-21

## 2019-06-09 RX ORDER — ZALEPLON 10 MG
5 CAPSULE ORAL AT BEDTIME
Refills: 0 | Status: DISCONTINUED | OUTPATIENT
Start: 2019-06-09 | End: 2019-06-09

## 2019-06-09 RX ORDER — FLUOXETINE HCL 10 MG
1 CAPSULE ORAL
Qty: 0 | Refills: 0 | DISCHARGE

## 2019-06-09 RX ORDER — METOPROLOL TARTRATE 50 MG
25 TABLET ORAL DAILY
Refills: 0 | Status: DISCONTINUED | OUTPATIENT
Start: 2019-06-09 | End: 2019-06-17

## 2019-06-09 RX ORDER — MAGNESIUM HYDROXIDE 400 MG/1
30 TABLET, CHEWABLE ORAL DAILY
Refills: 0 | Status: DISCONTINUED | OUTPATIENT
Start: 2019-06-09 | End: 2019-06-10

## 2019-06-09 RX ORDER — DOCUSATE SODIUM 100 MG
100 CAPSULE ORAL THREE TIMES A DAY
Refills: 0 | Status: DISCONTINUED | OUTPATIENT
Start: 2019-06-09 | End: 2019-06-21

## 2019-06-09 RX ORDER — HYDROMORPHONE HYDROCHLORIDE 2 MG/ML
1 INJECTION INTRAMUSCULAR; INTRAVENOUS; SUBCUTANEOUS ONCE
Refills: 0 | Status: DISCONTINUED | OUTPATIENT
Start: 2019-06-09 | End: 2019-06-09

## 2019-06-09 RX ORDER — METOPROLOL TARTRATE 50 MG
1 TABLET ORAL
Qty: 0 | Refills: 0 | DISCHARGE

## 2019-06-09 RX ORDER — ATOMOXETINE HYDROCHLORIDE 10 MG/1
1 CAPSULE ORAL
Qty: 0 | Refills: 0 | DISCHARGE

## 2019-06-09 RX ORDER — MORPHINE SULFATE 50 MG/1
4 CAPSULE, EXTENDED RELEASE ORAL EVERY 4 HOURS
Refills: 0 | Status: DISCONTINUED | OUTPATIENT
Start: 2019-06-09 | End: 2019-06-11

## 2019-06-09 RX ORDER — SODIUM CHLORIDE 9 MG/ML
1000 INJECTION INTRAMUSCULAR; INTRAVENOUS; SUBCUTANEOUS ONCE
Refills: 0 | Status: COMPLETED | OUTPATIENT
Start: 2019-06-09 | End: 2019-06-09

## 2019-06-09 RX ORDER — SODIUM CHLORIDE 9 MG/ML
1000 INJECTION, SOLUTION INTRAVENOUS
Refills: 0 | Status: DISCONTINUED | OUTPATIENT
Start: 2019-06-09 | End: 2019-06-10

## 2019-06-09 RX ORDER — FLUOXETINE HCL 10 MG
20 CAPSULE ORAL DAILY
Refills: 0 | Status: DISCONTINUED | OUTPATIENT
Start: 2019-06-09 | End: 2019-06-21

## 2019-06-09 RX ORDER — OXYCODONE HYDROCHLORIDE 5 MG/1
10 TABLET ORAL EVERY 4 HOURS
Refills: 0 | Status: DISCONTINUED | OUTPATIENT
Start: 2019-06-09 | End: 2019-06-11

## 2019-06-09 RX ADMIN — Medication 100 MILLIGRAM(S): at 21:32

## 2019-06-09 RX ADMIN — OXYCODONE HYDROCHLORIDE 10 MILLIGRAM(S): 5 TABLET ORAL at 21:48

## 2019-06-09 RX ADMIN — OXYCODONE HYDROCHLORIDE 10 MILLIGRAM(S): 5 TABLET ORAL at 16:30

## 2019-06-09 RX ADMIN — OXYCODONE HYDROCHLORIDE 5 MILLIGRAM(S): 5 TABLET ORAL at 20:31

## 2019-06-09 RX ADMIN — HYDROMORPHONE HYDROCHLORIDE 1 MILLIGRAM(S): 2 INJECTION INTRAMUSCULAR; INTRAVENOUS; SUBCUTANEOUS at 12:11

## 2019-06-09 RX ADMIN — FAMOTIDINE 20 MILLIGRAM(S): 10 INJECTION INTRAVENOUS at 21:32

## 2019-06-09 RX ADMIN — SODIUM CHLORIDE 1000 MILLILITER(S): 9 INJECTION INTRAMUSCULAR; INTRAVENOUS; SUBCUTANEOUS at 12:11

## 2019-06-09 RX ADMIN — OXYCODONE HYDROCHLORIDE 10 MILLIGRAM(S): 5 TABLET ORAL at 21:32

## 2019-06-09 RX ADMIN — OXYCODONE HYDROCHLORIDE 5 MILLIGRAM(S): 5 TABLET ORAL at 21:00

## 2019-06-09 RX ADMIN — OXYCODONE HYDROCHLORIDE 10 MILLIGRAM(S): 5 TABLET ORAL at 15:54

## 2019-06-09 RX ADMIN — Medication 5 MILLIGRAM(S): at 21:49

## 2019-06-09 NOTE — ED PROVIDER NOTE - OBJECTIVE STATEMENT
59 year old female with newly diagnosed metastatic CA as per orthopedic spine surgeon in ED accompanying patient on arrival presents to ED with concern for intractable pain.  Patient is scheduled to have surgery with Dr. Matos tomorrow, however was unable to tolerate pain at home and was instructed to come to ED.  Dr. Matos present at bedside with patient, noting he had tried to directly admit patient, however needed insurance authorization first.  He is requesting patient be admitted to his service, telemetry.  Patient notes history of ongoing back pain which worsened in the past "week or two," prompting her ED evaluation several days ago.  Patient denies paresthesias into extremities, loss of bowel/bladder function, fever, chills, chest pain, shortness of breath, abdominal pain, nausea, emesis, changes to bowel movements, or any additional acute complaints or concerns at this time.

## 2019-06-09 NOTE — ED PROVIDER NOTE - NSTIMEPROVIDERCAREINITIATE_GEN_ER
09-Jun-2019 11:50 Consent (Spinal Accessory)/Introductory Paragraph: The rationale for Mohs was explained to the patient and consent was obtained. The risks, benefits and alternatives to therapy were discussed in detail. Specifically, the risks of damage to the spinal accessory nerve, infection, scarring, bleeding, prolonged wound healing, incomplete removal, allergy to anesthesia, and recurrence were addressed. Prior to the procedure, the treatment site was clearly identified and confirmed by the patient. All components of Universal Protocol/PAUSE Rule completed.

## 2019-06-09 NOTE — H&P ADULT - ASSESSMENT
59F with spine lesions concerning for metastatic disease in setting of anemia of chronic disease. Plan for OR for tumor debulking, posterior spinal fusion with instrumentation     Admit to Orthopaedics  NPO at midnight  IVF  Pain control  Hold anticoagulation for OR  Labs  UA  type and screen  CXR   EKG  Medical Clearance  Discussed with attending, Dr Matos

## 2019-06-09 NOTE — H&P ADULT - HISTORY OF PRESENT ILLNESS
59F s/p R LISA (2/2019) p/w back pain which started earlier this year. Pt believed it to be related to her hip and went to see her PCP. She was diagnozed with anemia of chronic diease- further workup was done in the ER. She underwent a CT spine which demonstarted lytic lesions in spine concerning for metastatci disease. She went to see Dr. Matos for spine consultation who recommended surgery for tumor debulking and posterior spinal fusion. Currently denies numbness and tingilng. Endorses band like pain around mid-torso. No bowel or bladder incontinence. No gait imbalances. Of note, patient is a 35 year 0.5 pack smoker.

## 2019-06-09 NOTE — ED ADULT NURSE NOTE - OBJECTIVE STATEMENT
Pt brought in to ED by Dr Guardado to be admitted for surgery. Pt brought in to ED by Dr Guardado to be admitted for surgery. Pt states she has been suffering from worsening back pain x two months and was found to have spine metastasis. Primary source unknown. She denies numbness or tingling. Denies any other PMH, reports hx of RT THR in February 2019.

## 2019-06-09 NOTE — CONSULT NOTE ADULT - ASSESSMENT
59 year old F with PMH SBO, R LISA, newly diagnosed metastatic cancer with lytic lesions presents for spine surgery in setting of lytic lesions and epidural invasion at T10. Medicine consult requested for preoperative risk stratification    Recommend  1. Preoperative risk stratification  - Can resume home metoprolol as patient with no fever, no current signs or symptoms of sepsis. May represent inappropriate sinus tachycardia, please obtain collateral from Dr. Crain   - Patient is RCRI 0 and reports no history of CHF or TIA/CVA. Low risk for intermediate risk procedure  - Exercise tolerance good with mets >4, now pain limited, recent stress test and echo reported normal. Can obtain collateral from Dr. Crain for test results 59 year old F with PMH SBO, R LISA, newly diagnosed metastatic cancer with lytic lesions presents for spine surgery in setting of lytic lesions and epidural invasion at T10. Medicine consult requested for preoperative risk stratification    Recommend  1. Preoperative risk stratification  - Can resume home metoprolol as patient with no fever, no current signs or symptoms of sepsis  - May represent inappropriate sinus tachycardia, appears tachycardic on outpatient visits in [employer]. This may be due to her medication atomoxetine which has known side effect of tachycardia, consider taper  - Exercise tolerance good with mets >4, now pain limited, recent stress test 2 years ago reported normal and echo with EF 70%, diastolic dysfunction  - Please repeat EKG   - Patient is RCRI 0 and reports no history of CHF or TIA/CVA. Low risk for intermediate risk procedure 59 year old F with PMH SBO, R LISA, newly diagnosed metastatic cancer with lytic lesions presents for spine surgery in setting of lytic lesions and epidural invasion at T10. Medicine consult requested for preoperative risk stratification    Recommend  1. Preoperative risk stratification  - Can resume home metoprolol as patient with no fever, no current signs or symptoms of sepsis  - May represent inappropriate sinus tachycardia, appears tachycardic on outpatient visits in [employer]. This may be due to her medication atomoxetine which has known side effect of tachycardia, consider taper  - Exercise tolerance good with mets >4, now pain limited, recent stress test normal (reviewed in [employer]) and echo with EF 70%, diastolic dysfunction  - Please repeat EKG   - Patient is RCRI 0 and reports no history of CHF or TIA/CVA. Low risk for intermediate risk procedure

## 2019-06-09 NOTE — H&P ADULT - NSHPPHYSICALEXAM_GEN_ALL_CORE
NAD AAOX3 family at bedside   Negative spurlings  paracervical tenderness L > R   L D/B 4+, T/WE/WF/ stregth 5  R D/B/T/WE/WF/  strength 5   SILT C5-T1 bilat   2+ rad bilat   Negative hoffmans     L spine:   para-spinal tenderness L > R   L/R IP/Q/HS/TA/GS/EHL 4+   SILT L2-S1 bilat   2+ PT   Negative clonus

## 2019-06-09 NOTE — CONSULT NOTE ADULT - SUBJECTIVE AND OBJECTIVE BOX
Medicine Consult Note    YANETH QUESADA  59y  Female      Patient is a 59y old Female with a PMH SBO, R LISA in [place] (2/2019) who presents with back pain, currently scheduled for surgery tomorrow. Medicine consult team activated for preoperative risk stratification. Patient states that she began to have pain following her R LISA in February, which she initially attributed to post surgical pain. It persisted, and she was found to be anemic with chronic disease, was sent to Dr. Ramírez for further workup. She was seen at [facility] ED 6/[facility] for continued pain and found to have lytic lesions on CT suspicious for metastatic disease, with T10 epidural invasion. She follows with Dr Crain for cardiology had an echocardiogram 2 weeks ago which she reports was normal, as well as a stress test 2 years ago which she also reports was normal. Social history is significant for 1/2 PPD smoking history for 35 years, social EtOH use, no illicit drug use. She reports exercise tolerance to 7km walking without symptoms up to 6 months ago; she has not attempted to walk recently given limitation of pain. She states that she was started on metoprolol several years ago due to a "fast heart rate", which subsequently improved (and metoprolol was discontinued); she was recently resumed on metoprolol due to anxiety/increased heart rate. EKG with sinus tachycardia    Review of Systems:  Constitutional: no chills, fever  HEENT: no visual changes, no vertigo  Neck: no pain or stiffness  Respiratory: no cough, no shortness of breath  Cardiac: no chest pain or palpitations  GI: No nausea, emesis. Admits to diffuse back pain  : no dysuria  Neurological: no weakness  Skin: no itching, burning  All other ROS negative unless indicated above      T(C): 36.8 (06-09-19 @ 16:38), Max: 36.8 (06-09-19 @ 13:15)  HR: 102 (06-09-19 @ 16:38) (102 - 119)  BP: 110/75 (06-09-19 @ 16:38) (110/75 - 128/87)  RR: 16 (06-09-19 @ 16:38) (15 - 16)  SpO2: 98% (06-09-19 @ 16:38) (96% - 98%)  Wt(kg): --Vital Signs Last 24 Hrs  T(C): 36.8 (09 Jun 2019 16:38), Max: 36.8 (09 Jun 2019 13:15)  T(F): 98.2 (09 Jun 2019 16:38), Max: 98.2 (09 Jun 2019 13:15)  HR: 102 (09 Jun 2019 16:38) (102 - 119)  BP: 110/75 (09 Jun 2019 16:38) (110/75 - 128/87)  BP(mean): --  RR: 16 (09 Jun 2019 16:38) (15 - 16)  SpO2: 98% (09 Jun 2019 16:38) (96% - 98%)    Constitutional: WDWN F resting comfortably in bed, NAD  Head: NC/AT  Eyes: EOMI, anicteric sclera  ENT: no nasal discharge  Neck: supple  Respiratory: LLL with crackles  Cardiac: tachycardic rate, +S1/S2, unable to appreciate any murmur  Gastrointestinal: soft, NT, mildly distended, +BS  Extremities: WWP, no LE edema  Dermatologic: skin warm, dry and intact  Neurologic: Alert and oriented, no focal deficits appreciated  Psychiatric: affect and characteristics of appearance, verbalizations, behaviors are appropriate    Consultant(s) Notes Reviewed:  [x ] YES  [ ] NO  Care Discussed with Consultants/Other Providers [ x] YES  [ ] NO    LABS:                        13.5   12.54 )-----------( 342      ( 09 Jun 2019 12:01 )             43.0     06-09    134<L>  |  97  |  6<L>  ----------------------------<  139<H>  See Note   |  24  |  0.61    Ca    9.9      09 Jun 2019 12:01    TPro  8.3  /  Alb  3.3  /  TBili  0.3  /  DBili  x   /  AST  See Note  /  ALT  See Note  /  AlkPhos  See Note  06-09    PT/INR - ( 09 Jun 2019 12:01 )   PT: 14.1 sec;   INR: 1.24          PTT - ( 09 Jun 2019 12:01 )  PTT:33.8 sec    CAPILLARY BLOOD GLUCOSE                docusate sodium 100 milliGRAM(s) Oral three times a day  famotidine    Tablet 20 milliGRAM(s) Oral every 12 hours  HYDROmorphone  Injectable 0.5 milliGRAM(s) IV Push every 6 hours PRN  lactated ringers. 1000 milliLiter(s) IV Continuous <Continuous>  magnesium hydroxide Suspension 30 milliLiter(s) Oral daily PRN  morphine  - Injectable 4 milliGRAM(s) IV Push every 4 hours PRN  oxyCODONE    IR 10 milliGRAM(s) Oral every 4 hours PRN  oxyCODONE    IR 5 milliGRAM(s) Oral every 4 hours PRN  oxyCODONE  ER Tablet 10 milliGRAM(s) Oral every 12 hours  zaleplon 5 milliGRAM(s) Oral at bedtime PRN      RADIOLOGY & ADDITIONAL TESTS reviewed

## 2019-06-09 NOTE — ED PROVIDER NOTE - CLINICAL SUMMARY MEDICAL DECISION MAKING FREE TEXT BOX
Patient in ED accompanied by Dr. Matos, for intractable pain following diagnosis of metastatic spine CA this past week.  + Diffuse back pain.  Basic labs, IVF and initial dose of pain medication given in ED.  Dr. Matos requesting patient be admitted ASAP to floor.  No further imaging requested at this time.  Patient and family at bedside aware of plan and in agreement.  Will admit to Dr. Matos at this time.  Telemetry requested by admitting physician.

## 2019-06-09 NOTE — ED PROVIDER NOTE - MUSCULOSKELETAL, MLM
Spine appears normal, + diffuse tenderness on palpation throughout entire back.  No stepoff or deformity to cervical, thoracic or lumbar spine. range of motion is not limited, no deformities.

## 2019-06-09 NOTE — H&P ADULT - NSHPLABSRESULTS_GEN_ALL_CORE
CT:   Destructive bone lesions left posterior chest wall and left T10 vertebral   body pedicle with epidural invasion.    Lytic lesion left iliac bone.

## 2019-06-10 ENCOUNTER — RESULT REVIEW (OUTPATIENT)
Age: 59
End: 2019-06-10

## 2019-06-10 LAB
ANION GAP SERPL CALC-SCNC: 10 MMOL/L — SIGNIFICANT CHANGE UP (ref 5–17)
ANION GAP SERPL CALC-SCNC: 12 MMOL/L — SIGNIFICANT CHANGE UP (ref 5–17)
APTT BLD: 31.6 SEC — SIGNIFICANT CHANGE UP (ref 27.5–36.3)
BASOPHILS # BLD AUTO: 0.02 K/UL — SIGNIFICANT CHANGE UP (ref 0–0.2)
BASOPHILS # BLD AUTO: 0.02 K/UL — SIGNIFICANT CHANGE UP (ref 0–0.2)
BASOPHILS NFR BLD AUTO: 0.2 % — SIGNIFICANT CHANGE UP (ref 0–2)
BASOPHILS NFR BLD AUTO: 0.2 % — SIGNIFICANT CHANGE UP (ref 0–2)
BLD GP AB SCN SERPL QL: NEGATIVE — SIGNIFICANT CHANGE UP
BUN SERPL-MCNC: 5 MG/DL — LOW (ref 7–23)
BUN SERPL-MCNC: 6 MG/DL — LOW (ref 7–23)
CALCIUM SERPL-MCNC: 8.6 MG/DL — SIGNIFICANT CHANGE UP (ref 8.4–10.5)
CALCIUM SERPL-MCNC: 9.9 MG/DL — SIGNIFICANT CHANGE UP (ref 8.4–10.5)
CHLORIDE SERPL-SCNC: 106 MMOL/L — SIGNIFICANT CHANGE UP (ref 96–108)
CHLORIDE SERPL-SCNC: 110 MMOL/L — HIGH (ref 96–108)
CO2 SERPL-SCNC: 25 MMOL/L — SIGNIFICANT CHANGE UP (ref 22–31)
CO2 SERPL-SCNC: 26 MMOL/L — SIGNIFICANT CHANGE UP (ref 22–31)
CREAT SERPL-MCNC: 0.62 MG/DL — SIGNIFICANT CHANGE UP (ref 0.5–1.3)
CREAT SERPL-MCNC: 0.65 MG/DL — SIGNIFICANT CHANGE UP (ref 0.5–1.3)
EOSINOPHIL # BLD AUTO: 0 K/UL — SIGNIFICANT CHANGE UP (ref 0–0.5)
EOSINOPHIL # BLD AUTO: 0.06 K/UL — SIGNIFICANT CHANGE UP (ref 0–0.5)
EOSINOPHIL NFR BLD AUTO: 0 % — SIGNIFICANT CHANGE UP (ref 0–6)
EOSINOPHIL NFR BLD AUTO: 0.6 % — SIGNIFICANT CHANGE UP (ref 0–6)
GLUCOSE SERPL-MCNC: 118 MG/DL — HIGH (ref 70–99)
GLUCOSE SERPL-MCNC: 152 MG/DL — HIGH (ref 70–99)
HCT VFR BLD CALC: 35.9 % — SIGNIFICANT CHANGE UP (ref 34.5–45)
HCT VFR BLD CALC: 38.5 % — SIGNIFICANT CHANGE UP (ref 34.5–45)
HCV AB S/CO SERPL IA: 0.1 S/CO — SIGNIFICANT CHANGE UP
HCV AB SERPL-IMP: SIGNIFICANT CHANGE UP
HGB BLD-MCNC: 11.4 G/DL — LOW (ref 11.5–15.5)
HGB BLD-MCNC: 12 G/DL — SIGNIFICANT CHANGE UP (ref 11.5–15.5)
IMM GRANULOCYTES NFR BLD AUTO: 0.2 % — SIGNIFICANT CHANGE UP (ref 0–1.5)
IMM GRANULOCYTES NFR BLD AUTO: 0.9 % — SIGNIFICANT CHANGE UP (ref 0–1.5)
INR BLD: 1.18 — HIGH (ref 0.88–1.16)
LYMPHOCYTES # BLD AUTO: 0.79 K/UL — LOW (ref 1–3.3)
LYMPHOCYTES # BLD AUTO: 1.47 K/UL — SIGNIFICANT CHANGE UP (ref 1–3.3)
LYMPHOCYTES # BLD AUTO: 15.9 % — SIGNIFICANT CHANGE UP (ref 13–44)
LYMPHOCYTES # BLD AUTO: 7.3 % — LOW (ref 13–44)
MCHC RBC-ENTMCNC: 29.4 PG — SIGNIFICANT CHANGE UP (ref 27–34)
MCHC RBC-ENTMCNC: 30 PG — SIGNIFICANT CHANGE UP (ref 27–34)
MCHC RBC-ENTMCNC: 31.2 GM/DL — LOW (ref 32–36)
MCHC RBC-ENTMCNC: 31.8 GM/DL — LOW (ref 32–36)
MCV RBC AUTO: 92.5 FL — SIGNIFICANT CHANGE UP (ref 80–100)
MCV RBC AUTO: 96.3 FL — SIGNIFICANT CHANGE UP (ref 80–100)
MONOCYTES # BLD AUTO: 0.25 K/UL — SIGNIFICANT CHANGE UP (ref 0–0.9)
MONOCYTES # BLD AUTO: 0.64 K/UL — SIGNIFICANT CHANGE UP (ref 0–0.9)
MONOCYTES NFR BLD AUTO: 2.3 % — SIGNIFICANT CHANGE UP (ref 2–14)
MONOCYTES NFR BLD AUTO: 6.9 % — SIGNIFICANT CHANGE UP (ref 2–14)
NEUTROPHILS # BLD AUTO: 7.05 K/UL — SIGNIFICANT CHANGE UP (ref 1.8–7.4)
NEUTROPHILS # BLD AUTO: 9.66 K/UL — HIGH (ref 1.8–7.4)
NEUTROPHILS NFR BLD AUTO: 76.2 % — SIGNIFICANT CHANGE UP (ref 43–77)
NEUTROPHILS NFR BLD AUTO: 89.3 % — HIGH (ref 43–77)
NRBC # BLD: 0 /100 WBCS — SIGNIFICANT CHANGE UP (ref 0–0)
NRBC # BLD: 0 /100 WBCS — SIGNIFICANT CHANGE UP (ref 0–0)
PLATELET # BLD AUTO: 238 K/UL — SIGNIFICANT CHANGE UP (ref 150–400)
PLATELET # BLD AUTO: 295 K/UL — SIGNIFICANT CHANGE UP (ref 150–400)
POTASSIUM SERPL-MCNC: 4 MMOL/L — SIGNIFICANT CHANGE UP (ref 3.5–5.3)
POTASSIUM SERPL-MCNC: 4.1 MMOL/L — SIGNIFICANT CHANGE UP (ref 3.5–5.3)
POTASSIUM SERPL-SCNC: 4 MMOL/L — SIGNIFICANT CHANGE UP (ref 3.5–5.3)
POTASSIUM SERPL-SCNC: 4.1 MMOL/L — SIGNIFICANT CHANGE UP (ref 3.5–5.3)
PROTHROM AB SERPL-ACNC: 13.4 SEC — HIGH (ref 10–12.9)
RBC # BLD: 3.88 M/UL — SIGNIFICANT CHANGE UP (ref 3.8–5.2)
RBC # BLD: 4 M/UL — SIGNIFICANT CHANGE UP (ref 3.8–5.2)
RBC # FLD: 16.8 % — HIGH (ref 10.3–14.5)
RBC # FLD: 17.8 % — HIGH (ref 10.3–14.5)
RH IG SCN BLD-IMP: POSITIVE — SIGNIFICANT CHANGE UP
SODIUM SERPL-SCNC: 144 MMOL/L — SIGNIFICANT CHANGE UP (ref 135–145)
SODIUM SERPL-SCNC: 145 MMOL/L — SIGNIFICANT CHANGE UP (ref 135–145)
WBC # BLD: 10.82 K/UL — HIGH (ref 3.8–10.5)
WBC # BLD: 9.26 K/UL — SIGNIFICANT CHANGE UP (ref 3.8–10.5)
WBC # FLD AUTO: 10.82 K/UL — HIGH (ref 3.8–10.5)
WBC # FLD AUTO: 9.26 K/UL — SIGNIFICANT CHANGE UP (ref 3.8–10.5)

## 2019-06-10 PROCEDURE — 71275 CT ANGIOGRAPHY CHEST: CPT | Mod: 26

## 2019-06-10 PROCEDURE — 99233 SBSQ HOSP IP/OBS HIGH 50: CPT

## 2019-06-10 PROCEDURE — 74174 CTA ABD&PLVS W/CONTRAST: CPT | Mod: 26

## 2019-06-10 RX ORDER — ACETAMINOPHEN 500 MG
650 TABLET ORAL EVERY 6 HOURS
Refills: 0 | Status: DISCONTINUED | OUTPATIENT
Start: 2019-06-10 | End: 2019-06-21

## 2019-06-10 RX ORDER — MORPHINE SULFATE 50 MG/1
2 CAPSULE, EXTENDED RELEASE ORAL
Refills: 0 | Status: DISCONTINUED | OUTPATIENT
Start: 2019-06-10 | End: 2019-06-10

## 2019-06-10 RX ORDER — HYDROMORPHONE HYDROCHLORIDE 2 MG/ML
30 INJECTION INTRAMUSCULAR; INTRAVENOUS; SUBCUTANEOUS
Refills: 0 | Status: DISCONTINUED | OUTPATIENT
Start: 2019-06-10 | End: 2019-06-11

## 2019-06-10 RX ORDER — CYCLOBENZAPRINE HYDROCHLORIDE 10 MG/1
5 TABLET, FILM COATED ORAL THREE TIMES A DAY
Refills: 0 | Status: DISCONTINUED | OUTPATIENT
Start: 2019-06-10 | End: 2019-06-19

## 2019-06-10 RX ORDER — SENNA PLUS 8.6 MG/1
2 TABLET ORAL AT BEDTIME
Refills: 0 | Status: DISCONTINUED | OUTPATIENT
Start: 2019-06-11 | End: 2019-06-21

## 2019-06-10 RX ORDER — ONDANSETRON 8 MG/1
4 TABLET, FILM COATED ORAL EVERY 6 HOURS
Refills: 0 | Status: DISCONTINUED | OUTPATIENT
Start: 2019-06-10 | End: 2019-06-21

## 2019-06-10 RX ORDER — GENTAMICIN SULFATE 40 MG/ML
80 VIAL (ML) INJECTION EVERY 8 HOURS
Refills: 0 | Status: DISCONTINUED | OUTPATIENT
Start: 2019-06-11 | End: 2019-06-12

## 2019-06-10 RX ORDER — BUPIVACAINE 13.3 MG/ML
20 INJECTION, SUSPENSION, LIPOSOMAL INFILTRATION ONCE
Refills: 0 | Status: DISCONTINUED | OUTPATIENT
Start: 2019-06-10 | End: 2019-06-21

## 2019-06-10 RX ORDER — CEFAZOLIN SODIUM 1 G
2000 VIAL (EA) INJECTION EVERY 8 HOURS
Refills: 0 | Status: DISCONTINUED | OUTPATIENT
Start: 2019-06-10 | End: 2019-06-10

## 2019-06-10 RX ORDER — SODIUM CHLORIDE 9 MG/ML
1000 INJECTION, SOLUTION INTRAVENOUS
Refills: 0 | Status: DISCONTINUED | OUTPATIENT
Start: 2019-06-10 | End: 2019-06-11

## 2019-06-10 RX ORDER — ALBUMIN HUMAN 25 %
500 VIAL (ML) INTRAVENOUS ONCE
Refills: 0 | Status: COMPLETED | OUTPATIENT
Start: 2019-06-10 | End: 2019-06-10

## 2019-06-10 RX ORDER — CEFAZOLIN SODIUM 1 G
2000 VIAL (EA) INJECTION EVERY 8 HOURS
Refills: 0 | Status: DISCONTINUED | OUTPATIENT
Start: 2019-06-11 | End: 2019-06-12

## 2019-06-10 RX ORDER — HYDROMORPHONE HYDROCHLORIDE 2 MG/ML
1 INJECTION INTRAMUSCULAR; INTRAVENOUS; SUBCUTANEOUS
Refills: 0 | Status: DISCONTINUED | OUTPATIENT
Start: 2019-06-10 | End: 2019-06-11

## 2019-06-10 RX ORDER — NALOXONE HYDROCHLORIDE 4 MG/.1ML
0.1 SPRAY NASAL
Refills: 0 | Status: DISCONTINUED | OUTPATIENT
Start: 2019-06-10 | End: 2019-06-21

## 2019-06-10 RX ORDER — MAGNESIUM HYDROXIDE 400 MG/1
30 TABLET, CHEWABLE ORAL EVERY 12 HOURS
Refills: 0 | Status: DISCONTINUED | OUTPATIENT
Start: 2019-06-10 | End: 2019-06-21

## 2019-06-10 RX ADMIN — OXYCODONE HYDROCHLORIDE 10 MILLIGRAM(S): 5 TABLET ORAL at 00:34

## 2019-06-10 RX ADMIN — OXYCODONE HYDROCHLORIDE 5 MILLIGRAM(S): 5 TABLET ORAL at 05:10

## 2019-06-10 RX ADMIN — OXYCODONE HYDROCHLORIDE 10 MILLIGRAM(S): 5 TABLET ORAL at 01:34

## 2019-06-10 RX ADMIN — Medication 25 MILLIGRAM(S): at 05:32

## 2019-06-10 RX ADMIN — Medication 20 MILLIGRAM(S): at 12:34

## 2019-06-10 RX ADMIN — MORPHINE SULFATE 4 MILLIGRAM(S): 50 CAPSULE, EXTENDED RELEASE ORAL at 08:00

## 2019-06-10 RX ADMIN — HYDROMORPHONE HYDROCHLORIDE 30 MILLILITER(S): 2 INJECTION INTRAMUSCULAR; INTRAVENOUS; SUBCUTANEOUS at 22:34

## 2019-06-10 RX ADMIN — OXYCODONE HYDROCHLORIDE 10 MILLIGRAM(S): 5 TABLET ORAL at 09:33

## 2019-06-10 RX ADMIN — OXYCODONE HYDROCHLORIDE 5 MILLIGRAM(S): 5 TABLET ORAL at 04:40

## 2019-06-10 RX ADMIN — ATOMOXETINE HYDROCHLORIDE 60 MILLIGRAM(S): 10 CAPSULE ORAL at 12:34

## 2019-06-10 RX ADMIN — Medication 100 MILLIGRAM(S): at 14:43

## 2019-06-10 RX ADMIN — MORPHINE SULFATE 4 MILLIGRAM(S): 50 CAPSULE, EXTENDED RELEASE ORAL at 11:18

## 2019-06-10 RX ADMIN — MORPHINE SULFATE 4 MILLIGRAM(S): 50 CAPSULE, EXTENDED RELEASE ORAL at 12:36

## 2019-06-10 RX ADMIN — OXYCODONE HYDROCHLORIDE 10 MILLIGRAM(S): 5 TABLET ORAL at 10:33

## 2019-06-10 RX ADMIN — Medication 1000 MILLILITER(S): at 23:49

## 2019-06-10 RX ADMIN — FAMOTIDINE 20 MILLIGRAM(S): 10 INJECTION INTRAVENOUS at 09:33

## 2019-06-10 RX ADMIN — Medication 100 MILLIGRAM(S): at 05:32

## 2019-06-10 RX ADMIN — MORPHINE SULFATE 4 MILLIGRAM(S): 50 CAPSULE, EXTENDED RELEASE ORAL at 07:33

## 2019-06-10 NOTE — PROGRESS NOTE ADULT - SUBJECTIVE AND OBJECTIVE BOX
CC: No overnight events, no complaints.   Feeling well, pain is overall controlled.  Tolerates PO diet (+); Urination (+)  Denies cp, sob, dizziness, HA, abdominal pain, n/v.  Rest of ROS negative.     Vital Signs Last 24 Hrs  T(C): 36.5 (10 Fareed 2019 11:15), Max: 36.8 (10 Fareed 2019 05:05)  T(F): 97.7 (10 Fareed 2019 11:15), Max: 98.2 (10 Fareed 2019 05:05)  HR: 99 (10 Fareed 2019 12:39) (99 - 116)  BP: 131/76 (10 Fareed 2019 12:39) (113/69 - 131/76)  BP(mean): --  RR: 17 (10 Fareed 2019 12:39) (16 - 18)  SpO2: 94% (10 Fareed 2019 12:39) (91% - 96%)    PHYSICAL EXAMINATION  * General: Not in acute distress. Awake and alert. Lying comfortably in bed.  * Head: Normocephalic, atraumatic.  * HEENT: ears no discharge, eyes PERRLA, nose no discharge, throat no exudates, normal tonsils.  * Neck: no JVD, supple.  * Lungs: Clear to auscultation, no rales, no wheezes.  * Cardio: Regular rate and rhythm, no murmurs, no rubs, no gallops. Good peripheral pulses.  * Abdomen: Soft, non-tender, non-distended, tympanic to percussion, no rebound, no guarding, no rigidity. Bowel sounds present. No suprapubic or CVA tenderness.  * : Deferred.  * Extremities: Acyanotic, no edema.  * Skin: Warm and dry.  * Neuro: Alert and oriented x 3. No focal deficits. Motor strength is 5/5 throughout. Sensation intact. Cranial nerves II-XII grossly intact.                           12.0   9.26  )-----------( 295      ( 10 Fareed 2019 05:54 )             38.5   06-10    144  |  106  |  5<L>  ----------------------------<  118<H>  4.0   |  26  |  0.65    Ca    9.9      10 Fareed 2019 05:54    TPro  8.3  /  Alb  3.3  /  TBili  0.3  /  DBili  x   /  AST  See Note  /  ALT  See Note  /  AlkPhos  See Note  06-09    MEDICATIONS  (STANDING):  atoMOXetine 60 milliGRAM(s) Oral daily  docusate sodium 100 milliGRAM(s) Oral three times a day  famotidine    Tablet 20 milliGRAM(s) Oral every 12 hours  FLUoxetine 20 milliGRAM(s) Oral daily  lactated ringers. 1000 milliLiter(s) (100 mL/Hr) IV Continuous <Continuous>  metoprolol succinate ER 25 milliGRAM(s) Oral daily    MEDICATIONS  (PRN):  magnesium hydroxide Suspension 30 milliLiter(s) Oral daily PRN Constipation  morphine  - Injectable 4 milliGRAM(s) IV Push every 4 hours PRN Severe Pain (7 - 10)  oxyCODONE    IR 10 milliGRAM(s) Oral every 4 hours PRN Moderate Pain (4 - 6)  oxyCODONE    IR 5 milliGRAM(s) Oral every 4 hours PRN Mild Pain (1 - 3)  zaleplon 5 milliGRAM(s) Oral at bedtime PRN Insomnia

## 2019-06-10 NOTE — CONSULT NOTE ADULT - SUBJECTIVE AND OBJECTIVE BOX
Patient is a 59y old  Female who presents with a chief complaint of back pain (09 Jun 2019 13:07)        HPI:  59F s/p R LISA (2/2019) p/w back pain which started earlier this year. Pt believed it to be related to her hip and went to see her PCP. She was diagnozed with anemia of chronic diease- further workup was done in the ER. She underwent a CT spine which demonstarted lytic lesions in spine concerning for metastatci disease. She went to see Dr. Matos for spine consultation who recommended surgery for tumor debulking and posterior spinal fusion. Currently denies numbness and tingilng. Endorses band like pain around mid-torso. No bowel or bladder incontinence. No gait imbalances. Of note, patient is a 34 year 0.5 pack smoker. (09 Jun 2019 13:07)    Metastatic disease - denies bowel or bladder symptoms   Allergies  No Known Allergies      Health Issues  INTRACTABLE PAIN, METASTATIC DISEASE  Handoff  MEWS Score  No pertinent past medical history  Intractable pain  History of small bowel obstruction  BACK PAIN  5  Metastatic disease        FAMILY HISTORY:      MEDICATIONS  (STANDING):  atoMOXetine 60 milliGRAM(s) Oral daily  docusate sodium 100 milliGRAM(s) Oral three times a day  famotidine    Tablet 20 milliGRAM(s) Oral every 12 hours  FLUoxetine 20 milliGRAM(s) Oral daily  lactated ringers. 1000 milliLiter(s) (100 mL/Hr) IV Continuous <Continuous>  metoprolol succinate ER 25 milliGRAM(s) Oral daily  oxyCODONE  ER Tablet 10 milliGRAM(s) Oral every 12 hours    MEDICATIONS  (PRN):  HYDROmorphone  Injectable 0.5 milliGRAM(s) IV Push every 6 hours PRN Moderate Pain (4 - 6)  magnesium hydroxide Suspension 30 milliLiter(s) Oral daily PRN Constipation  morphine  - Injectable 4 milliGRAM(s) IV Push every 4 hours PRN Severe Pain (7 - 10)  oxyCODONE    IR 10 milliGRAM(s) Oral every 4 hours PRN Moderate Pain (4 - 6)  oxyCODONE    IR 5 milliGRAM(s) Oral every 4 hours PRN Mild Pain (1 - 3)  zaleplon 5 milliGRAM(s) Oral at bedtime PRN Insomnia      PAST MEDICAL & SURGICAL HISTORY:  No pertinent past medical history  History of small bowel obstruction      Labs                          12.0   9.26  )-----------( 295      ( 10 Fareed 2019 05:54 )             38.5     06-10    144  |  106  |  5<L>  ----------------------------<  118<H>  4.0   |  26  |  0.65    Ca    9.9      10 Fareed 2019 05:54    TPro  8.3  /  Alb  3.3  /  TBili  0.3  /  DBili  x   /  AST  See Note  /  ALT  See Note  /  AlkPhos  See Note  06-09      Radiology:    Physical Exam    MENTAL STATUS  -Level of Consciousness- awake    Orientation- person, place time  Language- aphasia/ dysarthria  Memory- recent and remote      Cranial Nerve 1- 12  Pupils- equal and reactive  Eye movements- nl  Facial - no asymmetry   Lower CN-nl    Gait and Station- n/a    MOTOR  Upper- nl   Lower- diffuse weakness    Reflexes- decrease LE- toes upgoing    Sensation - no sensory level    Cerebellar- no tremors    vascular - no bruits     Assessment- Metastatic disease     Plan - as per ortho - surgery

## 2019-06-10 NOTE — PROGRESS NOTE ADULT - ASSESSMENT
60yo F, PMH of SBO, R LISA, newly diagnosed metastatic cancer with lytic lesions presents for spine surgery in setting of lytic lesions and epidural invasion at T10. Medicine consult requested for preoperative medical clearance.    1) Pre-op medical clearance  * No active signs of MI/angina, acute decompensated HF, active valvular or pulmonary disease, arrhythmias  * METs: 10  * RCRI score is zero, class I.   * Risk stratification: Low risk patient for intermediate risk procedure  * Patient is medically optimize to undergo surgery without further cv testing     2) HTN,  * c/w Metoprolol, home med.     3) VTE ppx.   * Ambulation, SCDs

## 2019-06-10 NOTE — PROGRESS NOTE ADULT - SUBJECTIVE AND OBJECTIVE BOX
Ortho Note    NPO this am for OR. OOB to bathroom   Pt comfortable without complaints, pain endorsed but controlled  Denies CP, SOB, N/V, numbness/tingling     Vital Signs Last 24 Hrs  T(C): 36.7 (06-10-19 @ 08:43), Max: 36.8 (06-10-19 @ 05:05)  T(F): 98.1 (06-10-19 @ 08:43), Max: 98.2 (06-10-19 @ 05:05)  HR: 104 (06-10-19 @ 08:43) (104 - 109)  BP: 117/77 (06-10-19 @ 08:43) (115/76 - 117/77)  BP(mean): --  RR: 17 (06-10-19 @ 08:43) (16 - 17)  SpO2: 96% (06-10-19 @ 08:43) (91% - 96%)  AVSS    General: Pt Alert and oriented, NAD  Exam stable                           12.0   9.26  )-----------( 295      ( 10 Farede 2019 05:54 )             38.5   10 Fareed 2019 05:54    144    |  106    |  5      ----------------------------<  118    4.0     |  26     |  0.65     Ca    9.9        10 Fareed 2019 05:54    TPro  8.3    /  Alb  3.3    /  TBili  0.3    /  DBili  x      /  AST  See Note  /  ALT  See Note  /  AlkPhos  See Note  09 Jun 2019 12:01      A/P: 59F with spine lesions concerning for metastatic disease in setting of anemia of chronic disease. Plan for OR for tumor debulking, posterior spinal fusion with instrumentation     - Stat CT angio Chest/Abdomen to evaluate Artery of Adamkiewicz location   - OR today   - Pain Control  - DVT ppx: scds  - PT, WBS: WBAT   - f/u AM labs   - Dispo: home v rehab     Ortho Pager [phone]

## 2019-06-10 NOTE — PROGRESS NOTE ADULT - SUBJECTIVE AND OBJECTIVE BOX
Ortho Note    Pt seen and examined at bedside this AM  anticipating CT angio this AM   OR today with Dr Matos for posterior spinal fusion and tumor debulking  Patient comfortable without complaints, pain controlled  Denies CP, SOB, N/V, numbness/tingling     Vital Signs Last 24 Hrs  T(C): 36.5 (06-10-19 @ 11:15), Max: 36.7 (06-10-19 @ 08:43)  T(F): 97.7 (06-10-19 @ 11:15), Max: 98.1 (06-10-19 @ 08:43)  HR: 102 (06-10-19 @ 11:15) (102 - 104)  BP: 113/69 (06-10-19 @ 11:15) (113/69 - 117/77)  BP(mean): --  RR: 18 (06-10-19 @ 11:15) (17 - 18)  SpO2: 94% (06-10-19 @ 11:15) (94% - 96%)  AVSS    General: Pt Alert and oriented, NAD  Bilateral LE :   Pulses: 2+ and symmetric   Sensation: symmetric and intact bilateral LE   Motor: EHL/FHL/TA/GS 5/5 bilateral LE                           12.0   9.26  )-----------( 295      ( 10 Fareed 2019 05:54 )             38.5   10 Fareed 2019 05:54    144    |  106    |  5      ----------------------------<  118    4.0     |  26     |  0.65       TPro  8.3    /  Alb  3.3    /  TBili  0.3    /  DBili  x      /  AST  See Note  /  ALT  See Note  /  AlkPhos  See Note  09 Jun 2019 12:01      A/P: 59y Female thoracic spinal lesions concerning for metastatic disease     - Plan for OR today for PSF and tumor debulking with Dr Matos   - CT angio this AM for anatomic evaluation of Artery of Adamkiewicz (vascular supply to anterior portion of spine) prior to OR   - Medically cleared for OR per Dr Eddy   - Pain Control: PRN   - DVT ppx: SCDs, hold chemical ppx in anticipation of OR today   - PT, WBS: WBAT bilateral LE    Ortho Pager [phone]

## 2019-06-10 NOTE — CONSULT NOTE ADULT - SUBJECTIVE AND OBJECTIVE BOX
Pain Management Consult Note - [facility] [phone]    Chief Complaint: Thoracic Pain    HPI: Patient seen and examined today, patient in nad. Patient with x2 month of thoracic spine pain, found to have metastatic disease scheduled for tumor debulking at T9-T10 and T2. Patient reports thoracic spine pain that radiates down her back . Patient managed the pain at home with ibuprofen po and oxycodone 5-325mg PO 1-2 times a day, prescribed by Dr. Breana Ramírez. Discussed plan to start Dilaudid PCA post op, reviewed PCA use and side effects with patient. Patient verbalized understanding.       Pain is ___ sharp _x___dull ___burning _x__achy ___ Intensity: ____ mild _x__mod _x__severe     Location ____surgical site ____cervical _____lumbar ____abd ____upper ext____lower ext  __x_ thoracic pain    Worse with __x__activity _x___movement _____physical therapy___ Rest    Improved with _x___medication _x___rest ____physical therapy      ROS: Const:  ___febrile   Eyes:___ENT:___CV: _-__chest pain  Resp: __-__sob  GI:_-__nausea _-__vomiting _-__abd pain _x__npo ___clears __full diet __bm  :___ Musk: _x__pain ___spasm  Skin:___ Neuro:  _-__uoervhza_-__oxenmozmc___ numbness ___weakness __x_paresth  Psych:__anxiety  Endo:___ Heme:___Allergy:_________, _x__all others reviewed and negative      PAST MEDICAL & SURGICAL HISTORY:  No pertinent past medical history  History of small bowel obstruction      SH: _-__Tobacco   -___Alcohol                          FH:FAMILY HISTORY:      atoMOXetine 60 milliGRAM(s) Oral daily  docusate sodium 100 milliGRAM(s) Oral three times a day  famotidine    Tablet 20 milliGRAM(s) Oral every 12 hours  FLUoxetine 20 milliGRAM(s) Oral daily  HYDROmorphone  Injectable 0.5 milliGRAM(s) IV Push every 6 hours PRN  lactated ringers. 1000 milliLiter(s) IV Continuous <Continuous>  magnesium hydroxide Suspension 30 milliLiter(s) Oral daily PRN  metoprolol succinate ER 25 milliGRAM(s) Oral daily  morphine  - Injectable 4 milliGRAM(s) IV Push every 4 hours PRN  oxyCODONE    IR 10 milliGRAM(s) Oral every 4 hours PRN  oxyCODONE    IR 5 milliGRAM(s) Oral every 4 hours PRN  oxyCODONE  ER Tablet 10 milliGRAM(s) Oral every 12 hours  zaleplon 5 milliGRAM(s) Oral at bedtime PRN      T(C): 36.7 (06-10-19 @ 08:43), Max: 36.8 (06-09-19 @ 13:15)  HR: 104 (06-10-19 @ 08:43) (102 - 119)  BP: 117/77 (06-10-19 @ 08:43) (110/75 - 128/87)  RR: 17 (06-10-19 @ 08:43) (15 - 17)  SpO2: 96% (06-10-19 @ 08:43) (91% - 98%)  Wt(kg): --    T(C): 36.7 (06-10-19 @ 08:43), Max: 36.8 (06-09-19 @ 13:15)  HR: 104 (06-10-19 @ 08:43) (102 - 119)  BP: 117/77 (06-10-19 @ 08:43) (110/75 - 128/87)  RR: 17 (06-10-19 @ 08:43) (15 - 17)  SpO2: 96% (06-10-19 @ 08:43) (91% - 98%)  Wt(kg): --    T(C): 36.7 (06-10-19 @ 08:43), Max: 36.8 (06-09-19 @ 13:15)  HR: 104 (06-10-19 @ 08:43) (102 - 119)  BP: 117/77 (06-10-19 @ 08:43) (110/75 - 128/87)  RR: 17 (06-10-19 @ 08:43) (15 - 17)  SpO2: 96% (06-10-19 @ 08:43) (91% - 98%)  Wt(kg): --    PHYSICAL EXAM:  Gen Appearance: __x_no acute distress _x__appropriate        Neuro: _x__SILT feet____ EOM Intact Psych: AAOX_3_, _x__mood/affect appropriate        Eyes: __x_conjunctiva WNL  ___x__ Pupils equal and round        ENT: x___ears and nose atraumatic_x__ Hearing grossly intact        Neck: __x_trachea midline, no visible masses ___thyroid without palpable mass    Resp: x___Nml WOB____No tactile fremitus ___clear to auscultation    Cardio: _x__extremities free from edema __x__pedal pulses palpable    GI/Abdomen: __x_soft __x___ Nontender___x___Nondistended_____HSM    Lymphatic: ___no palpable nodes in neck  ___no palpable nodes calves and feet    Skin/Wound: ___Incision, ___Dressing c/d/i,   ____surrounding tissues soft,  ___drain/chest tube present____    Muscular: EHL _5__/5  Gastrocnemius_5__/5    ___absent clubbing/cyanosis        ASSESSMENT: This is a 59y old Female with a history of intractable pain and metastatic disease, scheduled for tumor debulking at T9-T10 and T2.      Recommended Treatment PLAN:  1. Dilaudid PCA 0.3mg Q6 minutes 14mg 4 hour max  2. Dilaudid 1mg Q2h IVP prn breakthrough pain  3. Flexeril 5mg Po Q8h prn muscle spasms  4. x2 lidocaine patch to affected area, 12hours on 12hours off  Plan discussed with Dr. Handy

## 2019-06-11 PROCEDURE — 99233 SBSQ HOSP IP/OBS HIGH 50: CPT

## 2019-06-11 RX ORDER — HYDROMORPHONE HYDROCHLORIDE 2 MG/ML
1 INJECTION INTRAMUSCULAR; INTRAVENOUS; SUBCUTANEOUS
Refills: 0 | Status: DISCONTINUED | OUTPATIENT
Start: 2019-06-11 | End: 2019-06-17

## 2019-06-11 RX ORDER — OXYCODONE HYDROCHLORIDE 5 MG/1
10 TABLET ORAL EVERY 4 HOURS
Refills: 0 | Status: DISCONTINUED | OUTPATIENT
Start: 2019-06-11 | End: 2019-06-12

## 2019-06-11 RX ORDER — SODIUM CHLORIDE 9 MG/ML
1000 INJECTION, SOLUTION INTRAVENOUS
Refills: 0 | Status: DISCONTINUED | OUTPATIENT
Start: 2019-06-11 | End: 2019-06-12

## 2019-06-11 RX ORDER — OXYCODONE HYDROCHLORIDE 5 MG/1
5 TABLET ORAL EVERY 4 HOURS
Refills: 0 | Status: DISCONTINUED | OUTPATIENT
Start: 2019-06-11 | End: 2019-06-12

## 2019-06-11 RX ORDER — LIDOCAINE 4 G/100G
2 CREAM TOPICAL DAILY
Refills: 0 | Status: DISCONTINUED | OUTPATIENT
Start: 2019-06-11 | End: 2019-06-21

## 2019-06-11 RX ORDER — DOCUSATE SODIUM 100 MG
100 CAPSULE ORAL THREE TIMES A DAY
Refills: 0 | Status: DISCONTINUED | OUTPATIENT
Start: 2019-06-11 | End: 2019-06-11

## 2019-06-11 RX ADMIN — FAMOTIDINE 20 MILLIGRAM(S): 10 INJECTION INTRAVENOUS at 10:33

## 2019-06-11 RX ADMIN — Medication 100 MILLIGRAM(S): at 06:21

## 2019-06-11 RX ADMIN — MORPHINE SULFATE 4 MILLIGRAM(S): 50 CAPSULE, EXTENDED RELEASE ORAL at 09:11

## 2019-06-11 RX ADMIN — Medication 100 MILLIGRAM(S): at 14:18

## 2019-06-11 RX ADMIN — OXYCODONE HYDROCHLORIDE 10 MILLIGRAM(S): 5 TABLET ORAL at 23:35

## 2019-06-11 RX ADMIN — Medication 2000 MILLIGRAM(S): at 18:21

## 2019-06-11 RX ADMIN — HYDROMORPHONE HYDROCHLORIDE 1 MILLIGRAM(S): 2 INJECTION INTRAMUSCULAR; INTRAVENOUS; SUBCUTANEOUS at 15:18

## 2019-06-11 RX ADMIN — OXYCODONE HYDROCHLORIDE 10 MILLIGRAM(S): 5 TABLET ORAL at 07:00

## 2019-06-11 RX ADMIN — Medication 2000 MILLIGRAM(S): at 10:32

## 2019-06-11 RX ADMIN — OXYCODONE HYDROCHLORIDE 10 MILLIGRAM(S): 5 TABLET ORAL at 18:20

## 2019-06-11 RX ADMIN — OXYCODONE HYDROCHLORIDE 10 MILLIGRAM(S): 5 TABLET ORAL at 10:56

## 2019-06-11 RX ADMIN — MORPHINE SULFATE 4 MILLIGRAM(S): 50 CAPSULE, EXTENDED RELEASE ORAL at 04:00

## 2019-06-11 RX ADMIN — OXYCODONE HYDROCHLORIDE 10 MILLIGRAM(S): 5 TABLET ORAL at 11:56

## 2019-06-11 RX ADMIN — SENNA PLUS 2 TABLET(S): 8.6 TABLET ORAL at 21:18

## 2019-06-11 RX ADMIN — LIDOCAINE 2 PATCH: 4 CREAM TOPICAL at 18:29

## 2019-06-11 RX ADMIN — HYDROMORPHONE HYDROCHLORIDE 1 MILLIGRAM(S): 2 INJECTION INTRAMUSCULAR; INTRAVENOUS; SUBCUTANEOUS at 14:18

## 2019-06-11 RX ADMIN — OXYCODONE HYDROCHLORIDE 10 MILLIGRAM(S): 5 TABLET ORAL at 19:20

## 2019-06-11 RX ADMIN — Medication 25 MILLIGRAM(S): at 06:21

## 2019-06-11 RX ADMIN — Medication 200 MILLIGRAM(S): at 10:33

## 2019-06-11 RX ADMIN — LIDOCAINE 2 PATCH: 4 CREAM TOPICAL at 15:06

## 2019-06-11 RX ADMIN — MORPHINE SULFATE 4 MILLIGRAM(S): 50 CAPSULE, EXTENDED RELEASE ORAL at 03:27

## 2019-06-11 RX ADMIN — FAMOTIDINE 20 MILLIGRAM(S): 10 INJECTION INTRAVENOUS at 21:19

## 2019-06-11 RX ADMIN — ATOMOXETINE HYDROCHLORIDE 60 MILLIGRAM(S): 10 CAPSULE ORAL at 14:18

## 2019-06-11 RX ADMIN — Medication 20 MILLIGRAM(S): at 14:18

## 2019-06-11 RX ADMIN — Medication 2000 MILLIGRAM(S): at 01:55

## 2019-06-11 RX ADMIN — Medication 200 MILLIGRAM(S): at 18:22

## 2019-06-11 RX ADMIN — OXYCODONE HYDROCHLORIDE 10 MILLIGRAM(S): 5 TABLET ORAL at 06:21

## 2019-06-11 RX ADMIN — Medication 100 MILLIGRAM(S): at 21:19

## 2019-06-11 RX ADMIN — OXYCODONE HYDROCHLORIDE 10 MILLIGRAM(S): 5 TABLET ORAL at 23:05

## 2019-06-11 RX ADMIN — MORPHINE SULFATE 4 MILLIGRAM(S): 50 CAPSULE, EXTENDED RELEASE ORAL at 10:11

## 2019-06-11 RX ADMIN — Medication 200 MILLIGRAM(S): at 01:19

## 2019-06-11 NOTE — CONSULT NOTE ADULT - ASSESSMENT
58 yo F with unspecified malignancy.    High ferritin in outpt likely from malignancy  Spine/ rib/ iliac lesions.   S/p debulking and stabalizing surgery.  Pathology from spinal specimen pending.  Cont pain control.  Incentive spirometer by bedside.   Needs DVT prophylaxis- for now ok with SCDs and ambulation.  Would add heparinoid product as soon as bleeding risk low- high risk for VTE.   Will follow.  Thank you for all recs.    Breana Ramírez MD  [phone]

## 2019-06-11 NOTE — CONSULT NOTE ADULT - SUBJECTIVE AND OBJECTIVE BOX
60 yo F with hx of hysterectomy­ tubal pregnancy, hip replacement in Feb 2019­ bone cyst initially seen by me on 5/20/19 after found to have high ferritin levels on outside labs. Work up including HH, MM, flow all negative. On 6/3/19 follow up continued to complain of bilateral flank pain. Point tenderness noted in mid-thoracic area. Pt sent for CT of C/A/P- positive for spine/ rib/ iliac lesions. Tumor markers elevated for breast ca (no breast mass however). Due to CT findings pt sent to ortho spine out of concern for impending vertebral fractures. Now admitted for surgery which was performed on 6/10/19. This morning continues to have pain in back. Able to communicate adequately. Daughter by bedside.     Past medical history:  As above    Past surg history:  As above    Social history:  Smoker- approx 10 per day, - lives with  in [facility], no durgs, alcohol socially    Family history:   No fam hx of ca    Home Medications:  atomoxetine 60 mg oral capsule: 1 cap(s) orally once a day (in the morning) (09 Jun 2019 19:24)  FLUoxetine 20 mg oral tablet: 1 tab(s) orally once a day (09 Jun 2019 19:24)  metoprolol succinate 25 mg oral tablet, extended release: 1 tab(s) orally once a day (09 Jun 2019 19:23)    Allergies  No Known Allergies    Exam:  Vital Signs Last 24 Hrs  T(C): 37.1 (11 Jun 2019 16:46), Max: 37.3 (11 Jun 2019 02:43)  T(F): 98.8 (11 Jun 2019 16:46), Max: 99.2 (11 Jun 2019 02:43)  HR: 133 (11 Jun 2019 16:46) (100 - 134)  BP: 122/71 (11 Jun 2019 16:46) (82/55 - 145/80)  BP(mean): 74 (11 Jun 2019 01:39) (64 - 104)  RR: 16 (11 Jun 2019 16:46) (9 - 25)  SpO2: 94% (11 Jun 2019 16:46) (84% - 99%)    AAOx3, somewhat uncomfortable, NAD  No scleral icterus  No pallor  Midline incision on back- no oozing or bleeding this AM  Clear to auscultation  Tachycardic  + pulses, equal  No edema    Labs:  WBC 10.8  Hgb 11.4  Plt 238  Cr ok  Liver ok    Imaging:  CT of C/A/P reviewed

## 2019-06-11 NOTE — PROGRESS NOTE ADULT - SUBJECTIVE AND OBJECTIVE BOX
CC: No overnight events, no complaints.   Feeling well, pain is overall controlled.  Tolerates PO diet (+); Urination (+)  Denies cp, sob, dizziness, HA, abdominal pain, n/v.  Rest of ROS negative.     Vital Signs Last 24 Hrs  T(C): 36.2 (11 Jun 2019 08:30), Max: 37.3 (11 Jun 2019 02:43)  T(F): 97.1 (11 Jun 2019 08:30), Max: 99.2 (11 Jun 2019 02:43)  HR: 125 (11 Jun 2019 08:30) (100 - 125)  BP: 145/80 (11 Jun 2019 08:30) (82/55 - 145/80)  BP(mean): 74 (11 Jun 2019 01:39) (64 - 104)  RR: 17 (11 Jun 2019 08:30) (9 - 25)  SpO2: 95% (11 Jun 2019 08:30) (84% - 99%)    PHYSICAL EXAMINATION  * General: Not in acute distress. Awake and alert. Lying comfortably in bed.  * Head: Normocephalic, atraumatic.  * HEENT: ears no discharge, eyes PERRLA, nose no discharge, throat no exudates, normal tonsils.  * Neck: no JVD, supple.  * Lungs: Clear to auscultation, no rales, no wheezes.  * Cardio: Regular rate and rhythm, no murmurs, no rubs, no gallops. Good peripheral pulses.  * Abdomen: Soft, non-tender, non-distended, tympanic to percussion, no rebound, no guarding, no rigidity. Bowel sounds present. No suprapubic or CVA tenderness.  * : Deferred.  * Extremities: Acyanotic, no edema.  * Skin: Warm and dry.  * Neuro: Alert and oriented x 3. No focal deficits. Motor strength is 5/5 throughout. Sensation intact. Cranial nerves II-XII grossly intact.                           11.4   10.82 )-----------( 238      ( 10 Fareed 2019 22:02 )             35.9   06-10    145  |  110<H>  |  6<L>  ----------------------------<  152<H>  4.1   |  25  |  0.62    Ca    8.6      10 Fareed 2019 22:01    MEDICATIONS  (STANDING):  atoMOXetine 60 milliGRAM(s) Oral daily  BUpivacaine liposome 1.3% Injectable (no eMAR) 20 milliLiter(s) Local Injection once  ceFAZolin  Injectable. 2000 milliGRAM(s) IV Push every 8 hours  docusate sodium 100 milliGRAM(s) Oral three times a day  famotidine    Tablet 20 milliGRAM(s) Oral every 12 hours  FLUoxetine 20 milliGRAM(s) Oral daily  gentamicin   IVPB 80 milliGRAM(s) IV Intermittent every 8 hours  lactated ringers. 1000 milliLiter(s) (80 mL/Hr) IV Continuous <Continuous>  lidocaine   Patch 2 Patch Transdermal daily  metoprolol succinate ER 25 milliGRAM(s) Oral daily  senna 2 Tablet(s) Oral at bedtime    MEDICATIONS  (PRN):  acetaminophen   Tablet .. 650 milliGRAM(s) Oral every 6 hours PRN Temp greater or equal to 38C (100.4F)  bisacodyl Suppository 10 milliGRAM(s) Rectal daily PRN If no bowel movement by POD#2  cyclobenzaprine 5 milliGRAM(s) Oral three times a day PRN Muscle Spasm  HYDROmorphone  Injectable 1 milliGRAM(s) IV Push every 2 hours PRN breakthrough pain  magnesium hydroxide Suspension 30 milliLiter(s) Oral every 12 hours PRN Constipation  naloxone Injectable 0.1 milliGRAM(s) IV Push every 3 minutes PRN For ANY of the following changes in patient status:  A. RR LESS THAN 10 breaths per minute, B. Oxygen saturation LESS THAN 90%, C. Sedation score of 6  ondansetron Injectable 4 milliGRAM(s) IV Push every 6 hours PRN Nausea  oxyCODONE    IR 10 milliGRAM(s) Oral every 4 hours PRN Severe Pain (7 - 10)  oxyCODONE    IR 5 milliGRAM(s) Oral every 4 hours PRN Moderate Pain (4 - 6)  zaleplon 5 milliGRAM(s) Oral at bedtime PRN Insomnia

## 2019-06-11 NOTE — PROGRESS NOTE ADULT - SUBJECTIVE AND OBJECTIVE BOX
Ortho Post Op Check    Procedure: Partial corpectomy T10; PSF T7-12  Surgeon: Carlo    Pt comfortable without complaints, pain controlled  Denies CP, SOB, N/V, numbness/tingling     Vital Signs Last 24 Hrs  T(C): --  T(F): --  HR: 118 (06-11-19 @ 01:09) (104 - 118)  BP: 102/60 (06-11-19 @ 01:09) (82/55 - 119/71)  BP(mean): 74 (06-11-19 @ 01:09) (64 - 104)  RR: 15 (06-11-19 @ 01:09) (9 - 25)  SpO2: 95% (06-11-19 @ 01:09) (84% - 99%)    General: Pt Alert and oriented, NAD  Back DSG C/D/I  HV x2 in place holding good suction   Bilateral LE sensation intact  Bilateral LE strength 5/5  2+ DP  toes wwp  BCR                          11.4   10.82 )-----------( 238      ( 10 Fareed 2019 22:02 )             35.9   10 Fareed 2019 22:01    145    |  110    |  6      ----------------------------<  152    4.1     |  25     |  0.62     Ca    8.6        10 Fareed 2019 22:01    TPro  8.3    /  Alb  3.3    /  TBili  0.3    /  DBili  x      /  AST  See Note  /  ALT  See Note  /  AlkPhos  See Note  09 Jun 2019 12:01      Drain output:    06-10-19 @ 07:01  -  06-11-19 @ 01:43  --------------------------------------------------------  OUT:    Drain: 30 mL    Drain: 100 mL  Total OUT: 130 mL      A/P: 59F s/p partial corpectomy T10; PSF T7-12 6/10  - Stable  - Pain Control  - DVT ppx: SCDs  - Post op abx: ancef and genta  - PT, WBS: WBAT  - f/u AM labs  - monitor drains  - dispo: home v rehab     Ortho Pager [phone]

## 2019-06-11 NOTE — PROGRESS NOTE ADULT - SUBJECTIVE AND OBJECTIVE BOX
Pain Management Progress Note - [facility] [phone]      HPI: Patient seen and examined today, patient in nad. Patient with x2 month of thoracic spine pain, found to have metastatic disease s/p  tumor debulking at T9-T10 and T2 pod#1. Patient reports thoracic spine pain and surgical site pain, pain managed with oxycodone PO.  Patient Axox3, denies n,v, no s/s of oversedation. patient with x2 drains, dressing c,d,i.       Pain is ___ sharp _x___dull ___burning _x__achy ___ Intensity: ____ mild _x__mod _x__severe     Location __x__surgical site ____cervical _____lumbar ____abd ____upper ext____lower ext  __x_ thoracic pain    Worse with __x__activity _x___movement _____physical therapy___ Rest    Improved with _x___medication _x___rest ____physical therapy      sodium chloride 0.9% Bolus  HYDROmorphone  Injectable  oxyCODONE    IR  oxyCODONE  ER Tablet  morphine  - Injectable  HYDROmorphone  Injectable  famotidine    Tablet  magnesium hydroxide Suspension  bisacodyl Suppository  docusate sodium  zaleplon  lactated ringers.  FLUoxetine  metoprolol succinate ER  atoMOXetine  morphine  - Injectable  HYDROmorphone PCA (1 mG/mL)  HYDROmorphone PCA (1 mG/mL) Rescue Clinician Bolus  naloxone Injectable  ondansetron Injectable  cyclobenzaprine  BUpivacaine liposome 1.3% Injectable (no eMAR)  lactated ringers.  acetaminophen   Tablet ..  ceFAZolin   IVPB  docusate sodium  magnesium hydroxide Suspension  senna  gentamicin   IVPB  ceFAZolin  Injectable.  albumin human  5% IVPB  ceFAZolin  Injectable.  lactated ringers.      ROS: Const:  ___febrile   Eyes:___ENT:___CV: _-__chest pain  Resp: __-__sob  GI:_-__nausea _-__vomiting _-__abd pain ___npo ___clears _x_full diet __bm  :___ Musk: _x__pain _-__spasm  Skin:___ Neuro:  _-__ftyfplmi_-__wzpmjvxtk_-__ numbness ___weakness __x_paresth  Psych:__anxiety  Endo:___ Heme:___Allergy:_________, _x__all others reviewed and negative      PAST MEDICAL & SURGICAL HISTORY:  No pertinent past medical history  History of small bowel obstruction      06-10 @ 22:0198 mL/min/1.73M2          Hemoglobin: 11.4 g/dL (06-10 @ 22:02)  Hemoglobin: 12.0 g/dL (06-10 @ 05:54)        T(C): 36.2 (06-11-19 @ 08:30), Max: 37.3 (06-11-19 @ 02:43)  HR: 125 (06-11-19 @ 08:30) (99 - 125)  BP: 145/80 (06-11-19 @ 08:30) (82/55 - 145/80)  RR: 17 (06-11-19 @ 08:30) (9 - 25)  SpO2: 95% (06-11-19 @ 08:30) (84% - 99%)  Wt(kg): --     PHYSICAL EXAM:  Gen Appearance: __x_no acute distress _x__appropriate        Neuro: _x__SILT feet____ EOM Intact Psych: AAOX_3_, _x__mood/affect appropriate        Eyes: __x_conjunctiva WNL  ___x__ Pupils equal and round        ENT: x___ears and nose atraumatic_x__ Hearing grossly intact        Neck: __x_trachea midline, no visible masses ___thyroid without palpable mass    Resp: x___Nml WOB____No tactile fremitus ___clear to auscultation    Cardio: _x__extremities free from edema __x__pedal pulses palpable    GI/Abdomen: __x_soft __x___ Nontender___x___Nondistended_____HSM    Lymphatic: ___no palpable nodes in neck  ___no palpable nodes calves and feet    Skin/Wound: ___Incision, _x__Dressing c/d/i,   ____surrounding tissues soft,  __x_drain/chest tube present____    Muscular: EHL _5__/5  Gastrocnemius_5__/5    ___absent clubbing/cyanosis        ASSESSMENT: This is a 59y old Female with a history of intractable pain and metastatic disease, scheduled for tumor debulking at T9-T10 and T2 pod#1, pain managed with current pain medication regimen.      Recommended Treatment PLAN:  1. Oxycodone 5-10mg PO Q4 prn moderate to severe pain  2. Dilaudid 1mg Q2h IVP prn breakthrough pain  3. Flexeril 5mg Po Q8h prn muscle spasms  4. x2 lidocaine patch to affected area, 12hours on 12hours off  Plan discussed with Dr. Bravo

## 2019-06-11 NOTE — PROGRESS NOTE ADULT - SUBJECTIVE AND OBJECTIVE BOX
Neurology Follow up note    Name  YANETH QUESADA    HPI:  59F s/p R LISA (2/2019) p/w back pain which started earlier this year. Pt believed it to be related to her hip and went to see her PCP. She was diagnozed with anemia of chronic diease- further workup was done in the ER. She underwent a CT spine which demonstarted lytic lesions in spine concerning for metastatci disease. She went to see Dr. Matos for spine consultation who recommended surgery for tumor debulking and posterior spinal fusion. Currently denies numbness and tingilng. Endorses band like pain around mid-torso. No bowel or bladder incontinence. No gait imbalances. Of note, patient is a 34 year 0.5 pack smoker. (09 Jun 2019 13:07)      Interval History - back pain and weakness in the LE- no new focal deficits        REVIEW OF SYSTEMS    Vital Signs Last 24 Hrs  T(C): 36.2 (11 Jun 2019 08:30), Max: 37.3 (11 Jun 2019 02:43)  T(F): 97.1 (11 Jun 2019 08:30), Max: 99.2 (11 Jun 2019 02:43)  HR: 125 (11 Jun 2019 08:30) (99 - 125)  BP: 145/80 (11 Jun 2019 08:30) (82/55 - 145/80)  BP(mean): 74 (11 Jun 2019 01:39) (64 - 104)  RR: 17 (11 Jun 2019 08:30) (9 - 25)  SpO2: 95% (11 Jun 2019 08:30) (84% - 99%)    Physical Exam-     Mental Status- awake and alert     Cranial Nerves- full EOM    Gait and station- n/a    Motor- moves all 4 extremities    Reflexes- decreased - upgoing toes    Sensation- no sensory level    Coordination- no tremors    Vascular - no bruits    Medications  acetaminophen   Tablet .. 650 milliGRAM(s) Oral every 6 hours PRN  atoMOXetine 60 milliGRAM(s) Oral daily  bisacodyl Suppository 10 milliGRAM(s) Rectal daily PRN  BUpivacaine liposome 1.3% Injectable (no eMAR) 20 milliLiter(s) Local Injection once  ceFAZolin  Injectable. 2000 milliGRAM(s) IV Push every 8 hours  cyclobenzaprine 5 milliGRAM(s) Oral three times a day PRN  docusate sodium 100 milliGRAM(s) Oral three times a day  famotidine    Tablet 20 milliGRAM(s) Oral every 12 hours  FLUoxetine 20 milliGRAM(s) Oral daily  gentamicin   IVPB 80 milliGRAM(s) IV Intermittent every 8 hours  lactated ringers. 1000 milliLiter(s) IV Continuous <Continuous>  magnesium hydroxide Suspension 30 milliLiter(s) Oral every 12 hours PRN  metoprolol succinate ER 25 milliGRAM(s) Oral daily  morphine  - Injectable 4 milliGRAM(s) IV Push every 4 hours PRN  naloxone Injectable 0.1 milliGRAM(s) IV Push every 3 minutes PRN  ondansetron Injectable 4 milliGRAM(s) IV Push every 6 hours PRN  oxyCODONE    IR 10 milliGRAM(s) Oral every 4 hours PRN  oxyCODONE    IR 5 milliGRAM(s) Oral every 4 hours PRN  senna 2 Tablet(s) Oral at bedtime  zaleplon 5 milliGRAM(s) Oral at bedtime PRN      Lab      Radiology    Assessment- Spinal cord lesion     Plan surgery as per ortho

## 2019-06-11 NOTE — PROGRESS NOTE ADULT - SUBJECTIVE AND OBJECTIVE BOX
Ortho    Procedure: Partial corpectomy T10; PSF T7-12  Surgeon: Carlo    C/o incisional pain; band-like chest tenderness has resolved post-op   Denies CP, SOB, N/V, numbness/tingling     Vital Signs Last 24 Hrs  T(C): 35.8 (11 Jun 2019 05:45), Max: 37.3 (11 Jun 2019 02:43)  T(F): 96.4 (11 Jun 2019 05:45), Max: 99.2 (11 Jun 2019 02:43)  HR: 125 (11 Jun 2019 05:45) (99 - 125)  BP: 125/74 (11 Jun 2019 05:45) (82/55 - 131/76)  BP(mean): 74 (11 Jun 2019 01:39) (64 - 104)  RR: 18 (11 Jun 2019 05:45) (9 - 25)  SpO2: 93% (11 Jun 2019 05:45) (84% - 99%)    General: Pt Alert and oriented, NAD  Back DSG C/D/I  HV x2 in place holding good suction   Bilateral LE sensation intact  Bilateral LE strength 5/5  2+ DP  toes wwp  BCR                          11.4   10.82 )-----------( 238      ( 10 Fareed 2019 22:02 )        A/P: 59F s/p partial corpectomy T10; PSF T7-12 6/10  - Tachy overnight - continue to monitor pain control   - Stable  - Pain Control  - DVT ppx: SCDs  - Post op abx: ancef and genta  - PT, WBS: WBAT  - f/u AM labs  - monitor drains  - dispo: home v rehab     Ortho Pager [phone]

## 2019-06-11 NOTE — PROVIDER CONTACT NOTE (OTHER) - SITUATION
Px arrived from RR with ST as much as 126 bpm. Also, px O2 sat RA as low as 85%. With O2 2LNC, sat improved to as much as 95%. Also, px came with bilateral cheek skin tears

## 2019-06-11 NOTE — PROGRESS NOTE ADULT - ASSESSMENT
60yo F, PMH of SBO, R LISA, newly diagnosed metastatic cancer with lytic lesions presents for spine surgery in setting of lytic lesions and epidural invasion at T10. Medicine consult requested for preoperative medical clearance. s/p PSF T6-12, POD-1 and planning for OR tomorrow with Dr. Mancia PSF C6-T6    1) Post-op state  * OOB-C  * Physical therapy evaluation  * Aggressive incentive spirometry  * Pain control and bowel regimen  * Mechanical LE ppx     2) HTN,  * c/w Metoprolol, home med.     3) Metastatic spinal disease  s/p debulking sx.    4) VTE ppx.   * Ambulation, SCDs

## 2019-06-11 NOTE — PROGRESS NOTE ADULT - SUBJECTIVE AND OBJECTIVE BOX
Orthopaedic Surgery Progress Note    Patient seen and examined. ELA. Patient without complaints. Back pain endorsed but controlled. Pt endorses left hip pain; pt has known left iliac lytic lesion.  Denies CP, SOB, N/V, tactile fevers, calf pain.      Vital Signs Last 24 Hrs  T(C): 36.2 (11 Jun 2019 08:30), Max: 37.3 (11 Jun 2019 02:43)  T(F): 97.1 (11 Jun 2019 08:30), Max: 99.2 (11 Jun 2019 02:43)  HR: 125 (11 Jun 2019 08:30) (99 - 125)  BP: 145/80 (11 Jun 2019 08:30) (82/55 - 145/80)  BP(mean): 74 (11 Jun 2019 01:39) (64 - 104)  RR: 17 (11 Jun 2019 08:30) (9 - 25)  SpO2: 95% (11 Jun 2019 08:30) (84% - 99%)         CAPILLARY BLOOD GLUCOSE      POCT Blood Glucose.: 140 mg/dL (10 Fareed 2019 20:09)  POCT Blood Glucose.: 117 mg/dL (10 Fareed 2019 18:41)                  Physical Exam:  Pt laying on her right side to avoid pressure on left hip 2/2 pain; NAD.  Skin warm and well perfused, no visible erythema/ecchymoses.  Dressing C/D/I; HV x2 holding suction  EHL/FHL/TA/GS 5/5 motor strength bilaterally   SLT in tact and equal to distal bilateral lower extremities  Calves soft and nontender to palpation   DP/PT pulses 2 +     LABS                        11.4   10.82 )-----------( 238      ( 10 Fareed 2019 22:02 )             35.9                              PT/INR - ( 10 Fareed 2019 05:55 )   PT: 13.4 sec;   INR: 1.18          PTT - ( 10 Fareed 2019 05:55 )  PTT:31.6 sec  06-10    145  |  110<H>  |  6<L>  ----------------------------<  152<H>  4.1   |  25  |  0.62    Ca    8.6      10 Fareed 2019 22:01    TPro  8.3  /  Alb  3.3  /  TBili  0.3  /  DBili  x   /  AST  See Note  /  ALT  See Note  /  AlkPhos  See Note  06-09             A/P: 59F POD #1 s/p PSF T6-12, for OR tomorrow with Dr. Mancia PSF C6-T6    CONTINUE:        1. PT: WBAT  2. DVT prophylaxis: SCDs  3. Pain Control as needed   4. Dispo:  OR tomorrow; NPO/IVF at midnight   5. Lechuga to remain in, [place] per Dr. Mancia  6. TLSO ordered   7. NTD regarding left hip pain at this time, Dr. CARRINGTON and Dr. FORD aware

## 2019-06-12 ENCOUNTER — RESULT REVIEW (OUTPATIENT)
Age: 59
End: 2019-06-12

## 2019-06-12 LAB
ANION GAP SERPL CALC-SCNC: 11 MMOL/L — SIGNIFICANT CHANGE UP (ref 5–17)
ANION GAP SERPL CALC-SCNC: 8 MMOL/L — SIGNIFICANT CHANGE UP (ref 5–17)
BASOPHILS # BLD AUTO: 0.06 K/UL — SIGNIFICANT CHANGE UP (ref 0–0.2)
BASOPHILS NFR BLD AUTO: 0.4 % — SIGNIFICANT CHANGE UP (ref 0–2)
BUN SERPL-MCNC: 3 MG/DL — LOW (ref 7–23)
BUN SERPL-MCNC: 9 MG/DL — SIGNIFICANT CHANGE UP (ref 7–23)
CA-I BLDA-SCNC: 1.11 MMOL/L — LOW (ref 1.12–1.3)
CALCIUM SERPL-MCNC: 8.5 MG/DL — SIGNIFICANT CHANGE UP (ref 8.4–10.5)
CALCIUM SERPL-MCNC: 9.6 MG/DL — SIGNIFICANT CHANGE UP (ref 8.4–10.5)
CHLORIDE SERPL-SCNC: 111 MMOL/L — HIGH (ref 96–108)
CHLORIDE SERPL-SCNC: 112 MMOL/L — HIGH (ref 96–108)
CO2 SERPL-SCNC: 28 MMOL/L — SIGNIFICANT CHANGE UP (ref 22–31)
CO2 SERPL-SCNC: 29 MMOL/L — SIGNIFICANT CHANGE UP (ref 22–31)
COHGB MFR BLDA: 0.2 % — SIGNIFICANT CHANGE UP
CREAT SERPL-MCNC: 0.6 MG/DL — SIGNIFICANT CHANGE UP (ref 0.5–1.3)
CREAT SERPL-MCNC: 0.73 MG/DL — SIGNIFICANT CHANGE UP (ref 0.5–1.3)
EOSINOPHIL # BLD AUTO: 0.01 K/UL — SIGNIFICANT CHANGE UP (ref 0–0.5)
EOSINOPHIL NFR BLD AUTO: 0.1 % — SIGNIFICANT CHANGE UP (ref 0–6)
GAS PNL BLDA: SIGNIFICANT CHANGE UP
GAS PNL BLDA: SIGNIFICANT CHANGE UP
GLUCOSE SERPL-MCNC: 132 MG/DL — HIGH (ref 70–99)
GLUCOSE SERPL-MCNC: 172 MG/DL — HIGH (ref 70–99)
HCO3 BLDA-SCNC: 25 MMOL/L — SIGNIFICANT CHANGE UP (ref 21–28)
HCT VFR BLD CALC: 28.2 % — LOW (ref 34.5–45)
HCT VFR BLD CALC: 38.9 % — SIGNIFICANT CHANGE UP (ref 34.5–45)
HGB BLD-MCNC: 12.1 G/DL — SIGNIFICANT CHANGE UP (ref 11.5–15.5)
HGB BLD-MCNC: 8.7 G/DL — LOW (ref 11.5–15.5)
HGB BLDA-MCNC: 10.6 G/DL — LOW (ref 11.5–15.5)
IMM GRANULOCYTES NFR BLD AUTO: 0.5 % — SIGNIFICANT CHANGE UP (ref 0–1.5)
LYMPHOCYTES # BLD AUTO: 1.89 K/UL — SIGNIFICANT CHANGE UP (ref 1–3.3)
LYMPHOCYTES # BLD AUTO: 11.4 % — LOW (ref 13–44)
MCHC RBC-ENTMCNC: 29.4 PG — SIGNIFICANT CHANGE UP (ref 27–34)
MCHC RBC-ENTMCNC: 29.5 PG — SIGNIFICANT CHANGE UP (ref 27–34)
MCHC RBC-ENTMCNC: 30.9 GM/DL — LOW (ref 32–36)
MCHC RBC-ENTMCNC: 31.1 GM/DL — LOW (ref 32–36)
MCV RBC AUTO: 94.4 FL — SIGNIFICANT CHANGE UP (ref 80–100)
MCV RBC AUTO: 95.6 FL — SIGNIFICANT CHANGE UP (ref 80–100)
METHGB MFR BLDA: 0.4 % — SIGNIFICANT CHANGE UP
MONOCYTES # BLD AUTO: 1.27 K/UL — HIGH (ref 0–0.9)
MONOCYTES NFR BLD AUTO: 7.7 % — SIGNIFICANT CHANGE UP (ref 2–14)
NEUTROPHILS # BLD AUTO: 13.27 K/UL — HIGH (ref 1.8–7.4)
NEUTROPHILS NFR BLD AUTO: 79.9 % — HIGH (ref 43–77)
NRBC # BLD: 0 /100 WBCS — SIGNIFICANT CHANGE UP (ref 0–0)
NRBC # BLD: 0 /100 WBCS — SIGNIFICANT CHANGE UP (ref 0–0)
O2 CT VFR BLDA CALC: 15.2 ML/DL — SIGNIFICANT CHANGE UP (ref 15–23)
OXYHGB MFR BLDA: 98 % — SIGNIFICANT CHANGE UP (ref 94–100)
PCO2 BLDA: 43 MMHG — SIGNIFICANT CHANGE UP (ref 32–45)
PH BLDA: 7.38 — SIGNIFICANT CHANGE UP (ref 7.35–7.45)
PLATELET # BLD AUTO: 181 K/UL — SIGNIFICANT CHANGE UP (ref 150–400)
PLATELET # BLD AUTO: 259 K/UL — SIGNIFICANT CHANGE UP (ref 150–400)
PO2 BLDA: 215 MMHG — HIGH (ref 83–108)
POTASSIUM BLDA-SCNC: 3.9 MMOL/L — SIGNIFICANT CHANGE UP (ref 3.5–4.9)
POTASSIUM SERPL-MCNC: 3.8 MMOL/L — SIGNIFICANT CHANGE UP (ref 3.5–5.3)
POTASSIUM SERPL-MCNC: 4.3 MMOL/L — SIGNIFICANT CHANGE UP (ref 3.5–5.3)
POTASSIUM SERPL-SCNC: 3.8 MMOL/L — SIGNIFICANT CHANGE UP (ref 3.5–5.3)
POTASSIUM SERPL-SCNC: 4.3 MMOL/L — SIGNIFICANT CHANGE UP (ref 3.5–5.3)
RBC # BLD: 2.95 M/UL — LOW (ref 3.8–5.2)
RBC # BLD: 4.12 M/UL — SIGNIFICANT CHANGE UP (ref 3.8–5.2)
RBC # FLD: 17.8 % — HIGH (ref 10.3–14.5)
RBC # FLD: 18.5 % — HIGH (ref 10.3–14.5)
SAO2 % BLDA: 99 % — SIGNIFICANT CHANGE UP (ref 95–100)
SODIUM BLDA-SCNC: 146 MMOL/L — SIGNIFICANT CHANGE UP (ref 138–146)
SODIUM SERPL-SCNC: 149 MMOL/L — HIGH (ref 135–145)
SODIUM SERPL-SCNC: 150 MMOL/L — HIGH (ref 135–145)
WBC # BLD: 14.68 K/UL — HIGH (ref 3.8–10.5)
WBC # BLD: 16.58 K/UL — HIGH (ref 3.8–10.5)
WBC # FLD AUTO: 14.68 K/UL — HIGH (ref 3.8–10.5)
WBC # FLD AUTO: 16.58 K/UL — HIGH (ref 3.8–10.5)

## 2019-06-12 PROCEDURE — 99255 IP/OBS CONSLTJ NEW/EST HI 80: CPT | Mod: GC

## 2019-06-12 PROCEDURE — 99233 SBSQ HOSP IP/OBS HIGH 50: CPT

## 2019-06-12 RX ORDER — CEFAZOLIN SODIUM 1 G
2000 VIAL (EA) INJECTION EVERY 8 HOURS
Refills: 0 | Status: DISCONTINUED | OUTPATIENT
Start: 2019-06-12 | End: 2019-06-16

## 2019-06-12 RX ORDER — DEXMEDETOMIDINE HYDROCHLORIDE IN 0.9% SODIUM CHLORIDE 4 UG/ML
0.7 INJECTION INTRAVENOUS
Qty: 200 | Refills: 0 | Status: DISCONTINUED | OUTPATIENT
Start: 2019-06-12 | End: 2019-06-12

## 2019-06-12 RX ORDER — ALBUMIN HUMAN 25 %
250 VIAL (ML) INTRAVENOUS ONCE
Refills: 0 | Status: COMPLETED | OUTPATIENT
Start: 2019-06-12 | End: 2019-06-12

## 2019-06-12 RX ORDER — POLYETHYLENE GLYCOL 3350 17 G/17G
17 POWDER, FOR SOLUTION ORAL DAILY
Refills: 0 | Status: DISCONTINUED | OUTPATIENT
Start: 2019-06-12 | End: 2019-06-21

## 2019-06-12 RX ORDER — FENTANYL CITRATE 50 UG/ML
0.5 INJECTION INTRAVENOUS
Qty: 2500 | Refills: 0 | Status: DISCONTINUED | OUTPATIENT
Start: 2019-06-12 | End: 2019-06-12

## 2019-06-12 RX ORDER — GENTAMICIN SULFATE 40 MG/ML
80 VIAL (ML) INJECTION EVERY 8 HOURS
Refills: 0 | Status: DISCONTINUED | OUTPATIENT
Start: 2019-06-12 | End: 2019-06-16

## 2019-06-12 RX ORDER — DEXMEDETOMIDINE HYDROCHLORIDE IN 0.9% SODIUM CHLORIDE 4 UG/ML
0.7 INJECTION INTRAVENOUS
Qty: 200 | Refills: 0 | Status: DISCONTINUED | OUTPATIENT
Start: 2019-06-12 | End: 2019-06-13

## 2019-06-12 RX ORDER — CALCIUM GLUCONATE 100 MG/ML
1 VIAL (ML) INTRAVENOUS ONCE
Refills: 0 | Status: COMPLETED | OUTPATIENT
Start: 2019-06-12 | End: 2019-06-12

## 2019-06-12 RX ORDER — BACITRACIN ZINC 500 UNIT/G
1 OINTMENT IN PACKET (EA) TOPICAL DAILY
Refills: 0 | Status: DISCONTINUED | OUTPATIENT
Start: 2019-06-12 | End: 2019-06-21

## 2019-06-12 RX ORDER — OXYCODONE HYDROCHLORIDE 5 MG/1
5 TABLET ORAL EVERY 4 HOURS
Refills: 0 | Status: DISCONTINUED | OUTPATIENT
Start: 2019-06-12 | End: 2019-06-13

## 2019-06-12 RX ORDER — OXYCODONE HYDROCHLORIDE 5 MG/1
10 TABLET ORAL EVERY 4 HOURS
Refills: 0 | Status: DISCONTINUED | OUTPATIENT
Start: 2019-06-12 | End: 2019-06-19

## 2019-06-12 RX ORDER — SODIUM CHLORIDE 9 MG/ML
1000 INJECTION, SOLUTION INTRAVENOUS
Refills: 0 | Status: DISCONTINUED | OUTPATIENT
Start: 2019-06-12 | End: 2019-06-14

## 2019-06-12 RX ORDER — ALBUMIN HUMAN 25 %
250 VIAL (ML) INTRAVENOUS ONCE
Refills: 0 | Status: DISCONTINUED | OUTPATIENT
Start: 2019-06-12 | End: 2019-06-21

## 2019-06-12 RX ORDER — FENTANYL CITRATE 50 UG/ML
0.5 INJECTION INTRAVENOUS
Qty: 2500 | Refills: 0 | Status: DISCONTINUED | OUTPATIENT
Start: 2019-06-12 | End: 2019-06-13

## 2019-06-12 RX ADMIN — SODIUM CHLORIDE 80 MILLILITER(S): 9 INJECTION, SOLUTION INTRAVENOUS at 07:33

## 2019-06-12 RX ADMIN — Medication 650 MILLIGRAM(S): at 00:11

## 2019-06-12 RX ADMIN — HYDROMORPHONE HYDROCHLORIDE 1 MILLIGRAM(S): 2 INJECTION INTRAMUSCULAR; INTRAVENOUS; SUBCUTANEOUS at 03:48

## 2019-06-12 RX ADMIN — HYDROMORPHONE HYDROCHLORIDE 1 MILLIGRAM(S): 2 INJECTION INTRAMUSCULAR; INTRAVENOUS; SUBCUTANEOUS at 03:29

## 2019-06-12 RX ADMIN — HYDROMORPHONE HYDROCHLORIDE 1 MILLIGRAM(S): 2 INJECTION INTRAMUSCULAR; INTRAVENOUS; SUBCUTANEOUS at 08:13

## 2019-06-12 RX ADMIN — Medication 100 MILLIGRAM(S): at 22:14

## 2019-06-12 RX ADMIN — LIDOCAINE 2 PATCH: 4 CREAM TOPICAL at 03:00

## 2019-06-12 RX ADMIN — HYDROMORPHONE HYDROCHLORIDE 1 MILLIGRAM(S): 2 INJECTION INTRAMUSCULAR; INTRAVENOUS; SUBCUTANEOUS at 08:30

## 2019-06-12 RX ADMIN — Medication 200 GRAM(S): at 21:30

## 2019-06-12 RX ADMIN — Medication 2000 MILLIGRAM(S): at 00:03

## 2019-06-12 RX ADMIN — HYDROMORPHONE HYDROCHLORIDE 1 MILLIGRAM(S): 2 INJECTION INTRAMUSCULAR; INTRAVENOUS; SUBCUTANEOUS at 23:17

## 2019-06-12 RX ADMIN — HYDROMORPHONE HYDROCHLORIDE 1 MILLIGRAM(S): 2 INJECTION INTRAMUSCULAR; INTRAVENOUS; SUBCUTANEOUS at 22:54

## 2019-06-12 RX ADMIN — Medication 2000 MILLIGRAM(S): at 22:14

## 2019-06-12 RX ADMIN — Medication 204 MILLIGRAM(S): at 22:14

## 2019-06-12 RX ADMIN — Medication 200 MILLIGRAM(S): at 00:03

## 2019-06-12 RX ADMIN — Medication 125 MILLILITER(S): at 19:30

## 2019-06-12 RX ADMIN — Medication 650 MILLIGRAM(S): at 00:25

## 2019-06-12 RX ADMIN — FAMOTIDINE 20 MILLIGRAM(S): 10 INJECTION INTRAVENOUS at 22:14

## 2019-06-12 RX ADMIN — Medication 25 MILLIGRAM(S): at 05:50

## 2019-06-12 NOTE — PROGRESS NOTE ADULT - SUBJECTIVE AND OBJECTIVE BOX
Orthopaedic Surgery Progress Note    Patient seen and examined. ELA. Patient without complaints. Pain endorsed but controlled; left hip pain improved from yesterday. Pt encouraged to use incentive spirometer.  Denies CP, SOB, N/V, tactile fevers, calf pain. For PSF C6-T6 today.       Vital Signs Last 24 Hrs  T(C): 37.2 (12 Jun 2019 09:03), Max: 38.4 (12 Jun 2019 00:08)  T(F): 99 (12 Jun 2019 09:03), Max: 101.1 (12 Jun 2019 00:08)  HR: 113 (12 Jun 2019 09:03) (113 - 136)  BP: 124/70 (12 Jun 2019 09:03) (120/82 - 129/86)  BP(mean): --  RR: 16 (12 Jun 2019 09:03) (16 - 18)  SpO2: 95% (12 Jun 2019 09:03) (91% - 97%)        Physical Exam:  Pt laying comfortably in bed, NAD.  Skin warm and well perfused, no visible erythema/ecchymoses.  Dressing C/D/I; HV x 2   EHL/FHL/TA/GS 5/5 motor strength bilaterally  SLT in tact and equal to distal bilateral lower extremities  Calves soft and nontender to palpation   DP/PT pulses 2+     LABS                        12.1   16.58 )-----------( 259      ( 12 Jun 2019 06:49 )             38.9                                06-12    150<H>  |  111<H>  |  3<L>  ----------------------------<  132<H>  3.8   |  28  |  0.60    Ca    9.6      12 Jun 2019 06:49          A/P: 59F with unknown metastatic disease POD #2 s/p T9 partial corpectomy, T6-T12 PSF, for C6-T6 PSF today.    CONTINUE:        1. PT: WBAT  2. DVT prophylaxis: SCDs  3. Pain Control as needed   4. Dispo: Pending   5. Appreciate Dr. Eddy, Pain, and Heme/Onc Recs

## 2019-06-12 NOTE — PROGRESS NOTE ADULT - ASSESSMENT
58 yo F with unspecified malignancy.    High ferritin in outpt likely from malignancy  Spine/ rib/ iliac lesions.   S/p debulking and stabalizing surgery.  Further surg tomorrow.  Pathology from spinal specimen still pending.  Will reach out to path for a prelim read.  Cont pain control.  Incentive spirometer by bedside.   Needs DVT prophylaxis- for now ok with SCDs and ambulation.  Would add heparinoid product as soon as all procedures complete and bleeding risk low- high risk for VTE.   Will follow.  Thank you for all recs.    Breana Ramírez MD  [phone]

## 2019-06-12 NOTE — BRIEF OPERATIVE NOTE - NSICDXBRIEFPROCEDURE_GEN_ALL_CORE_FT
PROCEDURES:  Fusion of 7 to 12 spinal segments by posterior approach 10-Fareed-2019 21:24:47  Ap Sanchez  Partial corpectomy of thoracic spine at single level 10-Fareed-2019 21:24:25  Ap Sanchez
PROCEDURES:  Fusion of 7 to 12 spinal segments by posterior approach 10-Fareed-2019 21:24:47  Ap Sanchez  Partial corpectomy of thoracic spine at single level 10-Fareed-2019 21:24:25  Ap Sanchez

## 2019-06-12 NOTE — BRIEF OPERATIVE NOTE - NSICDXBRIEFPOSTOP_GEN_ALL_CORE_FT
POST-OP DIAGNOSIS:  Thoracic spine tumor 10-Fareed-2019 21:25:16  Ap Sanchez
POST-OP DIAGNOSIS:  Thoracic spine tumor 10-Fareed-2019 21:25:16  Ap Sanchez

## 2019-06-12 NOTE — PROGRESS NOTE ADULT - SUBJECTIVE AND OBJECTIVE BOX
Ortho Post Op Check    Procedure: PSF C6-T6  Surgeon: Carlo    Pt comfortable but reports some neck discomfort, pain controlled  Denies CP, SOB, N/V, numbness/tingling     Vital Signs Last 24 Hrs  T(C): 36.2 (06-12-19 @ 19:00), Max: 36.2 (06-12-19 @ 19:00)  T(F): 97.1 (06-12-19 @ 19:00), Max: 97.1 (06-12-19 @ 19:00)  HR: 96 (06-12-19 @ 23:00) (96 - 108)  BP: 118/83 (06-12-19 @ 23:00) (75/61 - 118/83)  BP(mean): 93 (06-12-19 @ 23:00) (64 - 93)  RR: 7 (06-12-19 @ 23:00) (7 - 21)  SpO2: 97% (06-12-19 @ 23:00) (95% - 98%)    General: Pt Alert and oriented, NAD  Neck and back DSG C/D/I  Bilateral UE sensation intact  Bilateral LE sensation intact  B/l UE motor strength 5/5  B/l LE strength 5/5  fingers and toes wwp  BCR                          8.7    14.68 )-----------( 181      ( 12 Jun 2019 19:39 )             28.2   12 Jun 2019 19:39    149    |  112    |  9      ----------------------------<  172    4.3     |  29     |  0.73     Ca    8.5        12 Jun 2019 19:39    Drain output:    06-11-19 @ 07:01  -  06-12-19 @ 07:00  --------------------------------------------------------  OUT:    Drain: 210 mL    Drain: 260 mL  Total OUT: 470 mL      06-12-19 @ 07:01  -  06-12-19 @ 23:36  --------------------------------------------------------  OUT:  Total OUT: 0 mL    Post-op X-Ray: Intraop fluoro     A/P: 59yFemale POD#0 s/p PSF C6-T6 6/12  - Stable  - Pain Control  - strict BP control  - DVT ppx: SCDs  - Post op abx: ancef periop  - PT, WBS: WBAT  - dispo: SICU for close HD monitoring     Ortho Pager [phone]

## 2019-06-12 NOTE — PROGRESS NOTE ADULT - SUBJECTIVE AND OBJECTIVE BOX
Ortho    Procedure: Partial corpectomy T10; PSF T7-12  Surgeon: Carlo    NPO for OR Today for PCF   C/o incisional pain; band-like chest tenderness has resolved post-op   Denies CP, SOB, N/V, numbness/tingling     Vital Signs Last 24 Hrs  T(C): 37.2 (12 Jun 2019 09:03), Max: 38.4 (12 Jun 2019 00:08)  T(F): 99 (12 Jun 2019 09:03), Max: 101.1 (12 Jun 2019 00:08)  HR: 113 (12 Jun 2019 09:03) (113 - 136)  BP: 124/70 (12 Jun 2019 09:03) (120/82 - 129/86)  BP(mean): --  RR: 16 (12 Jun 2019 09:03) (16 - 18)  SpO2: 95% (12 Jun 2019 09:03) (91% - 97%)    General: Pt Alert and oriented, NAD  Back DSG C/D/I  HV x2 in place holding good suction   Bilateral LE sensation intact  Bilateral LE strength 5/5  2+ DP  toes wwp  BCR                          11.4   10.82 )-----------( 238      ( 10 Fareed 2019 22:02 )        A/P: 59F s/p partial corpectomy T10; PSF T7-12 6/10  - NPO for OR  - Tachy overnight - continue to monitor pain control, encourage IS use   - Stable  - Pain Control  - DVT ppx: SCDs  - Post op abx: ancef and genta  - PT, WBS: WBAT  - f/u AM labs  - monitor drains  - dispo: home v rehab     Ortho Pager [phone]

## 2019-06-12 NOTE — CONSULT NOTE ADULT - SUBJECTIVE AND OBJECTIVE BOX
Attending:  Jagjit    HPI:  59F hx of hysterectomy, s/p R hip replacement, (2/2019) p/w back pain which started earlier this year. Pt believed it to be related to her hip, initially diagnosed with anemia of chronic diease- further workup w/ CT spine demonstrated lytic lesions in spine concerning for metastatic disease. She went to see Dr. Matos for spine consultation who recommended surgery for tumor debulking and posterior spinal fusion. Currently denies numbness and tingilng. Endorses band like pain around mid-torso. No bowel or bladder incontinence. No gait imbalances. Of note, patient is a 34 year 0.5 pack smoker.    SICU    She underwent tumor de-bulking via partial corpectomy of T9 6/10 for the first stage of her procedure. She went for the 2nd stage spinal fusion on 6/12 for fusion of T6-T12 via posterior approach, during which there was significant EBL of 1.5 L and a postoperative drop in hgb of 12 to 8.7. SICU was consulted for postoperative monitoring and pain control.     Patient was seen in PACU by SICU resident post operatively. Patient's family was by bedside. She was alert, mentating well, and had no complaints. She denied chest pain, sob, dyspnea, headache, lightheadedness. Also denied paresthesias, sensory changes, or weakness of any of her extremities.         PAST MEDICAL & SURGICAL HISTORY:  No pertinent past medical history  History of small bowel obstruction      Home Medications:  atomoxetine 60 mg oral capsule: 1 cap(s) orally once a day (in the morning) (09 Jun 2019 19:24)  FLUoxetine 20 mg oral tablet: 1 tab(s) orally once a day (09 Jun 2019 19:24)  metoprolol succinate 25 mg oral tablet, extended release: 1 tab(s) orally once a day (09 Jun 2019 19:23)      Allergies  No Known Allergies    Intolerances          SOCIAL HISTORY:      FAMILY HISTORY:          Vital Signs Last 24 Hrs  T(C): 36.2 (06-12-19 @ 19:00), Max: 36.2 (06-12-19 @ 19:00)  T(F): 97.1 (06-12-19 @ 19:00), Max: 97.1 (06-12-19 @ 19:00)  HR: 96 (06-12-19 @ 23:00) (96 - 108)  BP: 118/83 (06-12-19 @ 23:00) (75/61 - 118/83)  BP(mean): 93 (06-12-19 @ 23:00) (64 - 93)  RR: 7 (06-12-19 @ 23:00) (7 - 21)  SpO2: 97% (06-12-19 @ 23:00) (95% - 98%)      PHYSICAL EXAM  Gen:  NAD, resting comfortably, extubated  Neuro: alert, a&o x3, 5/5 strength b/l upper and lower extremities, no sensory deficits  HEENT: no jvd, neck and back dressing cdi  Pulm:  no respiratory distress, nonlabored breathing, Clear to auscultation bilaterally   C/V: s1s2, non-tachycardic, no murmurs  Abd: soft, NT/ND  : urena in place, clear diluted urine output  Extrem: warm and well-perfused, non edematous  Drains: drains in place, w/ sanguinous output            LABS:                        7.8    15.90 )-----------( 155      ( 13 Jun 2019 00:05 )             25.2     06-12    149<H>  |  112<H>  |  9   ----------------------------<  172<H>  4.3   |  29  |  0.73    Ca    8.5      12 Jun 2019 19:39          CAPILLARY BLOOD GLUCOSE      POCT Blood Glucose.: 117 mg/dL (12 Jun 2019 15:12)            MEDICATIONS  (STANDING):  albumin human  5% IVPB 250 milliLiter(s) IV Intermittent once  atoMOXetine 60 milliGRAM(s) Oral daily  BACItracin   Ointment 1 Application(s) Topical daily  BUpivacaine liposome 1.3% Injectable (no eMAR) 20 milliLiter(s) Local Injection once  ceFAZolin  Injectable. 2000 milliGRAM(s) IV Push every 8 hours  dexmedetomidine Infusion 0.7 MICROgram(s)/kG/Hr (13.492 mL/Hr) IV Continuous <Continuous>  docusate sodium 100 milliGRAM(s) Oral three times a day  famotidine    Tablet 20 milliGRAM(s) Oral every 12 hours  fentaNYL   Infusion 0.5 MICROgram(s)/kG/Hr (3.855 mL/Hr) IV Continuous <Continuous>  FLUoxetine 20 milliGRAM(s) Oral daily  gentamicin   IVPB 80 milliGRAM(s) IV Intermittent every 8 hours  lactated ringers. 1000 milliLiter(s) (100 mL/Hr) IV Continuous <Continuous>  lidocaine   Patch 2 Patch Transdermal daily  metoprolol succinate ER 25 milliGRAM(s) Oral daily  polyethylene glycol 3350 17 Gram(s) Oral daily  senna 2 Tablet(s) Oral at bedtime    MEDICATIONS  (PRN):  acetaminophen   Tablet .. 650 milliGRAM(s) Oral every 6 hours PRN Temp greater or equal to 38C (100.4F)  aluminum hydroxide/magnesium hydroxide/simethicone Suspension 30 milliLiter(s) Oral four times a day PRN Indigestion  bisacodyl Suppository 10 milliGRAM(s) Rectal daily PRN If no bowel movement by POD#2  cyclobenzaprine 5 milliGRAM(s) Oral three times a day PRN Muscle Spasm  HYDROmorphone  Injectable 1 milliGRAM(s) IV Push every 2 hours PRN breakthrough pain  magnesium hydroxide Suspension 30 milliLiter(s) Oral every 12 hours PRN Constipation  naloxone Injectable 0.1 milliGRAM(s) IV Push every 3 minutes PRN For ANY of the following changes in patient status:  A. RR LESS THAN 10 breaths per minute, B. Oxygen saturation LESS THAN 90%, C. Sedation score of 6  ondansetron Injectable 4 milliGRAM(s) IV Push every 6 hours PRN Nausea  oxyCODONE    IR 5 milliGRAM(s) Oral every 4 hours PRN Mild Pain (1 - 3)  oxyCODONE    IR 10 milliGRAM(s) Oral every 4 hours PRN Moderate Pain (4 - 6)  zaleplon 5 milliGRAM(s) Oral at bedtime PRN Insomnia        RADIOLOGY & ADDITIONAL STUDIES Attending:  Jagjit    HPI:  59F hx of hysterectomy, s/p R hip replacement, (2/2019) p/w back pain which started earlier this year. Pt believed it to be related to her hip, initially diagnosed with anemia of chronic diease- further workup w/ CT spine demonstrated lytic lesions in spine concerning for metastatic disease. She went to see Dr. Matos for spine consultation who recommended surgery for tumor debulking and posterior spinal fusion. Of note, patient is a 34 year 0.5 pack smoker.    SICU    She underwent tumor de-bulking via partial corpectomy of T9 and 1st stage PSF T6-T12 (on 6/10) followed by 2nd stage PSF T6-T12 today, during which there was significant EBL of 1.5 L and a postoperative drop in hgb of 12 to 8.7. SICU was consulted for postoperative monitoring and pain control.     Patient was seen in PACU by SICU resident post operatively. Patient's family was by bedside. She was alert, mentating well, and had no complaints. She denied chest pain, sob, dyspnea, headache, lightheadedness. Also denied paresthesias, sensory changes, or weakness of any of her extremities.         PAST MEDICAL & SURGICAL HISTORY:  No pertinent past medical history  History of small bowel obstruction      Home Medications:  atomoxetine 60 mg oral capsule: 1 cap(s) orally once a day (in the morning) (09 Jun 2019 19:24)  FLUoxetine 20 mg oral tablet: 1 tab(s) orally once a day (09 Jun 2019 19:24)  metoprolol succinate 25 mg oral tablet, extended release: 1 tab(s) orally once a day (09 Jun 2019 19:23)      Allergies  No Known Allergies    Intolerances          SOCIAL HISTORY:      FAMILY HISTORY:          Vital Signs Last 24 Hrs  T(C): 36.2 (06-12-19 @ 19:00), Max: 36.2 (06-12-19 @ 19:00)  T(F): 97.1 (06-12-19 @ 19:00), Max: 97.1 (06-12-19 @ 19:00)  HR: 96 (06-12-19 @ 23:00) (96 - 108)  BP: 118/83 (06-12-19 @ 23:00) (75/61 - 118/83)  BP(mean): 93 (06-12-19 @ 23:00) (64 - 93)  RR: 7 (06-12-19 @ 23:00) (7 - 21)  SpO2: 97% (06-12-19 @ 23:00) (95% - 98%)      PHYSICAL EXAM  Gen:  NAD, resting comfortably, extubated  Neuro: alert, a&o x3, 5/5 strength b/l upper and lower extremities, no sensory deficits  HEENT: no jvd, neck and back dressing cdi  Pulm:  no respiratory distress, nonlabored breathing, Clear to auscultation bilaterally   C/V: s1s2, non-tachycardic, no murmurs  Abd: soft, NT/ND  : urena in place, clear diluted urine output  Extrem: warm and well-perfused, non edematous  Drains: drains in place, w/ sanguinous output            LABS:                        7.8    15.90 )-----------( 155      ( 13 Jun 2019 00:05 )             25.2     06-12    149<H>  |  112<H>  |  9   ----------------------------<  172<H>  4.3   |  29  |  0.73    Ca    8.5      12 Jun 2019 19:39          CAPILLARY BLOOD GLUCOSE      POCT Blood Glucose.: 117 mg/dL (12 Jun 2019 15:12)            MEDICATIONS  (STANDING):  albumin human  5% IVPB 250 milliLiter(s) IV Intermittent once  atoMOXetine 60 milliGRAM(s) Oral daily  BACItracin   Ointment 1 Application(s) Topical daily  BUpivacaine liposome 1.3% Injectable (no eMAR) 20 milliLiter(s) Local Injection once  ceFAZolin  Injectable. 2000 milliGRAM(s) IV Push every 8 hours  dexmedetomidine Infusion 0.7 MICROgram(s)/kG/Hr (13.492 mL/Hr) IV Continuous <Continuous>  docusate sodium 100 milliGRAM(s) Oral three times a day  famotidine    Tablet 20 milliGRAM(s) Oral every 12 hours  fentaNYL   Infusion 0.5 MICROgram(s)/kG/Hr (3.855 mL/Hr) IV Continuous <Continuous>  FLUoxetine 20 milliGRAM(s) Oral daily  gentamicin   IVPB 80 milliGRAM(s) IV Intermittent every 8 hours  lactated ringers. 1000 milliLiter(s) (100 mL/Hr) IV Continuous <Continuous>  lidocaine   Patch 2 Patch Transdermal daily  metoprolol succinate ER 25 milliGRAM(s) Oral daily  polyethylene glycol 3350 17 Gram(s) Oral daily  senna 2 Tablet(s) Oral at bedtime    MEDICATIONS  (PRN):  acetaminophen   Tablet .. 650 milliGRAM(s) Oral every 6 hours PRN Temp greater or equal to 38C (100.4F)  aluminum hydroxide/magnesium hydroxide/simethicone Suspension 30 milliLiter(s) Oral four times a day PRN Indigestion  bisacodyl Suppository 10 milliGRAM(s) Rectal daily PRN If no bowel movement by POD#2  cyclobenzaprine 5 milliGRAM(s) Oral three times a day PRN Muscle Spasm  HYDROmorphone  Injectable 1 milliGRAM(s) IV Push every 2 hours PRN breakthrough pain  magnesium hydroxide Suspension 30 milliLiter(s) Oral every 12 hours PRN Constipation  naloxone Injectable 0.1 milliGRAM(s) IV Push every 3 minutes PRN For ANY of the following changes in patient status:  A. RR LESS THAN 10 breaths per minute, B. Oxygen saturation LESS THAN 90%, C. Sedation score of 6  ondansetron Injectable 4 milliGRAM(s) IV Push every 6 hours PRN Nausea  oxyCODONE    IR 5 milliGRAM(s) Oral every 4 hours PRN Mild Pain (1 - 3)  oxyCODONE    IR 10 milliGRAM(s) Oral every 4 hours PRN Moderate Pain (4 - 6)  zaleplon 5 milliGRAM(s) Oral at bedtime PRN Insomnia        RADIOLOGY & ADDITIONAL STUDIES

## 2019-06-12 NOTE — PROGRESS NOTE ADULT - ASSESSMENT
A/P  Mrs. Goldsmith is a 60 yo lady with newly diagnosed metastatic cancer, who presented to me with spine instability, unremitting pain and cord compression.   We elected to proceed to the operating room for several principles. To obtain spine stability, treat pain, and get tissue for histologic diagnosis.     She is POD 1 from T6-T12 instrumented fusion, Left T9 and T10 Rhizotomy, Partial T9 corpectomy, T9-10 laminectomy and costotransversectomy.   She is doing well. Pain is well controlled. Preoperative pain is gone.  Will plan to mobilize. Keep Lechuga in, drains in. AM labs and Chest xray.   Will plan to return to OR on Wednesday to address the T2 lesion.   In the mean time, ADAT. no chemical DVT prophylaxis.

## 2019-06-12 NOTE — PROGRESS NOTE ADULT - ASSESSMENT
60yo F, PMH of SBO, R LISA, newly diagnosed metastatic cancer with lytic lesions presents for spine surgery in setting of lytic lesions and epidural invasion at T10. Medicine consult requested for preoperative medical clearance. s/p PSF T6-12, POD-2 and planning for OR today with Dr. Mancia PSF C6-T6    1) Sinus tachycardia, likely pain related.  * Needs Incentive spirometry  * Pain control -pain management    2) HTN,  * c/w Toprol XL 25mg po daily -home med     3) Metastatic spinal disease  s/p debulking sx.  * second stage sx today    4) Post-op state  * OOB-C  * Physical therapy evaluation  * Aggressive incentive spirometry  * Pain control and bowel regimen  * Mechanical LE ppx     5) VTE ppx.   * Ambulation, SCDs

## 2019-06-12 NOTE — PROGRESS NOTE ADULT - SUBJECTIVE AND OBJECTIVE BOX
Neurology Follow up note    Name  YANETH QUESADA    HPI:  59F s/p R LISA (2/2019) p/w back pain which started earlier this year. Pt believed it to be related to her hip and went to see her PCP. She was diagnozed with anemia of chronic diease- further workup was done in the ER. She underwent a CT spine which demonstarted lytic lesions in spine concerning for metastatci disease. She went to see Dr. Matos for spine consultation who recommended surgery for tumor debulking and posterior spinal fusion. Currently denies numbness and tingilng. Endorses band like pain around mid-torso. No bowel or bladder incontinence. No gait imbalances. Of note, patient is a 34 year 0.5 pack smoker. (09 Jun 2019 13:07)      Interval History - reports discomfort in the LE and back with pain        REVIEW OF SYSTEMS    Vital Signs Last 24 Hrs  T(C): 37.2 (12 Jun 2019 09:03), Max: 38.4 (12 Jun 2019 00:08)  T(F): 99 (12 Jun 2019 09:03), Max: 101.1 (12 Jun 2019 00:08)  HR: 113 (12 Jun 2019 09:03) (113 - 136)  BP: 124/70 (12 Jun 2019 09:03) (120/82 - 127/79)  BP(mean): --  RR: 16 (12 Jun 2019 09:03) (16 - 18)  SpO2: 95% (12 Jun 2019 09:03) (91% - 95%)    Physical Exam-     Mental Status- awake and alert    Cranial Nerves- full EOM    Gait and station- n/a    Motor- moves all 4 extremities- weakness in the LE - upgoing toes    Reflexes- decreased    Sensation- no sensory level    Coordination- no tremors    Vascular - no bruits    Medications  acetaminophen   Tablet .. 650 milliGRAM(s) Oral every 6 hours PRN  atoMOXetine 60 milliGRAM(s) Oral daily  BACItracin   Ointment 1 Application(s) Topical daily  bisacodyl Suppository 10 milliGRAM(s) Rectal daily PRN  BUpivacaine liposome 1.3% Injectable (no eMAR) 20 milliLiter(s) Local Injection once  ceFAZolin  Injectable. 2000 milliGRAM(s) IV Push every 8 hours  cyclobenzaprine 5 milliGRAM(s) Oral three times a day PRN  dexmedetomidine Infusion 0.7 MICROgram(s)/kG/Hr IV Continuous <Continuous>  docusate sodium 100 milliGRAM(s) Oral three times a day  famotidine    Tablet 20 milliGRAM(s) Oral every 12 hours  FLUoxetine 20 milliGRAM(s) Oral daily  gentamicin   IVPB 80 milliGRAM(s) IV Intermittent every 8 hours  HYDROmorphone  Injectable 1 milliGRAM(s) IV Push every 2 hours PRN  lactated ringers. 1000 milliLiter(s) IV Continuous <Continuous>  lidocaine   Patch 2 Patch Transdermal daily  magnesium hydroxide Suspension 30 milliLiter(s) Oral every 12 hours PRN  metoprolol succinate ER 25 milliGRAM(s) Oral daily  naloxone Injectable 0.1 milliGRAM(s) IV Push every 3 minutes PRN  ondansetron Injectable 4 milliGRAM(s) IV Push every 6 hours PRN  oxyCODONE    IR 10 milliGRAM(s) Oral every 4 hours PRN  oxyCODONE    IR 5 milliGRAM(s) Oral every 4 hours PRN  senna 2 Tablet(s) Oral at bedtime  zaleplon 5 milliGRAM(s) Oral at bedtime PRN      Lab      Radiology    Assessment- Thoracic spine tumor     Plan surgery number 2 as per ortho

## 2019-06-12 NOTE — PROGRESS NOTE ADULT - SUBJECTIVE AND OBJECTIVE BOX
Interval history:  Pt resting comfortably. Discussed with daughter this AM briefly. Unable to participate in PT.     60 yo F with hx of hysterectomy­ tubal pregnancy, hip replacement in Feb 2019­ bone cyst initially seen by me on 5/20/19 after found to have high ferritin levels on outside labs. Work up including HH, MM, flow all negative. On 6/3/19 follow up continued to complain of bilateral flank pain. Point tenderness noted in mid-thoracic area. Pt sent for CT of C/A/P- positive for spine/ rib/ iliac lesions. Tumor markers elevated for breast ca (no breast mass however). Due to CT findings pt sent to ortho spine out of concern for impending vertebral fractures. Now admitted for surgery which was performed on 6/10/19. This morning continues to have pain in back. Able to communicate adequately. Daughter by bedside.     Past medical history:  As above    Past surg history:  As above    Social history:  Smoker- approx 10 per day, - lives with  in [facility], no durgs, alcohol socially    Family history:   No fam hx of ca    Home Medications:  atomoxetine 60 mg oral capsule: 1 cap(s) orally once a day (in the morning) (09 Jun 2019 19:24)  FLUoxetine 20 mg oral tablet: 1 tab(s) orally once a day (09 Jun 2019 19:24)  metoprolol succinate 25 mg oral tablet, extended release: 1 tab(s) orally once a day (09 Jun 2019 19:23)    Allergies  No Known Allergies    Exam:  Vital Signs Last 24 Hrs  T(C): 37.3 (12 Jun 2019 05:24), Max: 38.4 (12 Jun 2019 00:08)  T(F): 99.2 (12 Jun 2019 05:24), Max: 101.1 (12 Jun 2019 00:08)  HR: 128 (12 Jun 2019 05:24) (128 - 136)  BP: 120/82 (12 Jun 2019 05:24) (120/82 - 129/86)  BP(mean): --  RR: 17 (12 Jun 2019 05:24) (16 - 18)  SpO2: 94% (12 Jun 2019 05:24) (91% - 97%)    AAOx3, somewhat uncomfortable, NAD  No scleral icterus  No pallor  Midline incision on back- no oozing or bleeding this AM  Clear to auscultation  Tachycardic  + pulses, equal  No edema    Labs:  WBC 16.5  Hgb 12.1  Plt 259  Cr ok  Alk phos elevated    Path still pending.     Imaging:  CT of C/A/P reviewed

## 2019-06-12 NOTE — BRIEF OPERATIVE NOTE - NSICDXBRIEFPREOP_GEN_ALL_CORE_FT
PRE-OP DIAGNOSIS:  Thoracic spine tumor 10-Fareed-2019 21:25:05  Ap Sanchez
PRE-OP DIAGNOSIS:  Thoracic spine tumor 10-Fareed-2019 21:25:05  Ap Sanchez

## 2019-06-12 NOTE — CONSULT NOTE ADULT - ASSESSMENT
59F hx of hysterectomy, s/p R hip replacement, (2/2019), w/ lytic lesions         A/P: 59yFemale POD#0 s/p PSF C6-T6 6/12  - Stable  - Pain Control  - strict BP control  - DVT ppx: SCDs  - Post op abx: ancef periop  - PT, WBS: WBAT  - dispo: SICU for close HD monitoring 59F hx of hysterectomy, s/p R hip replacement, (2/2019), w/ metastatic spinal lesions of unknown primary, now s/p 2 stage tumor debulking via T9 corporectomy followed by 2 stage T6-T12 fusion (on 6/10) and C6-T6 fusion (on 6/12) via posterior approach. Transferred to SICU for HD monitoring.     Plan:   Neuro: dilaudid PRN; fentanyl PRN  CV: -160  Pulm: extubated, HF nasal cannula    GI/FEN: CLD, PPI  : urena (6/10 --)  Heme: transfuse prbc PRN  ID: periop abx while drains in: ancef (6/12 --), gentamicin (6/12 --)  Endo: ISS  ppx: SCDs, hold SQH until Friday  Wounds: hemovac x2, xeroform, gauze, tegaderm  PT: weight bearing as tolerated

## 2019-06-12 NOTE — PROGRESS NOTE ADULT - SUBJECTIVE AND OBJECTIVE BOX
Orthopaedic Spine Attending Note    S:  Petra is doing well, she is HD stable. She is in a bit of pain this morning. No SOB. Her preoperative chest and back pain is resolved. She was incapacitated from this pain, now she has post operative pain, however, she is doing much better.     acetaminophen   Tablet .. 650 milliGRAM(s) Oral every 6 hours PRN  acetaminophen   Tablet .. 975 milliGRAM(s) Oral every 8 hours  albumin human  5% IVPB 250 milliLiter(s) IV Intermittent once  aluminum hydroxide/magnesium hydroxide/simethicone Suspension 30 milliLiter(s) Oral four times a day PRN  atoMOXetine 60 milliGRAM(s) Oral daily  BACItracin   Ointment 1 Application(s) Topical daily  bisacodyl Suppository 10 milliGRAM(s) Rectal daily PRN  BUpivacaine liposome 1.3% Injectable (no eMAR) 20 milliLiter(s) Local Injection once  cyclobenzaprine 5 milliGRAM(s) Oral three times a day PRN  docusate sodium 100 milliGRAM(s) Oral three times a day  famotidine    Tablet 20 milliGRAM(s) Oral every 12 hours  FIRST- Mouthwash  BLM 10 milliLiter(s) Swish and Spit every 6 hours  FLUoxetine 20 milliGRAM(s) Oral daily  heparin  Injectable 5000 Unit(s) SubCutaneous every 8 hours  HYDROmorphone  Injectable 1 milliGRAM(s) IV Push every 2 hours PRN  lidocaine   Patch 2 Patch Transdermal daily  magnesium hydroxide Suspension 30 milliLiter(s) Oral every 12 hours PRN  metoprolol succinate ER 50 milliGRAM(s) Oral daily  multivitamin/minerals 1 Tablet(s) Oral daily  naloxone Injectable 0.1 milliGRAM(s) IV Push every 3 minutes PRN  ondansetron Injectable 4 milliGRAM(s) IV Push every 6 hours PRN  oxyCODONE    IR 5 milliGRAM(s) Oral every 4 hours PRN  oxyCODONE    IR 10 milliGRAM(s) Oral every 4 hours PRN  polyethylene glycol 3350 17 Gram(s) Oral daily  senna 2 Tablet(s) Oral at bedtime      O:  General: Well-appearing adult Female lying supine; NAD; A&Ox3  Back: Dressings in place, dry  Hemovac drains x ____ with ______/_____cc last shift/24 hrs serosanguinous output  Motor:  LUE- Delt/Bicep/Tricep/Wrist Ext/ 5/5 strength   RUE- Delt/Bicep/Tricep/Wrist Ext/ 5/5 strength  LLE- Hip Flex/Knee Ext/TA/EHL/GS 5/5 strength  RLE- Hip Flex/Knee Ext/TA/EHL/GS 5/5 strength  Sensory:  LUE- SILT C5-T1 dermatomes  RUE- SILT C5-T1 dermatomes  LLE- SILT L2-S1 dermatomes   RLE- SILT L2-S1 dermatomes  Vascular:  Feet WWP; DP 2+ bilaterally; Cap refill < 2 sec  Hands WWP; Radial 2+ bilaterally; Cap refill < 2 sec      O:  Vital Signs Last 24 Hrs  T(C): 37.2 (12 Jun 2019 09:03), Max: 38.4 (12 Jun 2019 00:08)  T(F): 99 (12 Jun 2019 09:03), Max: 101.1 (12 Jun 2019 00:08)  HR: 113 (12 Jun 2019 09:03) (113 - 136)  BP: 124/70 (12 Jun 2019 09:03) (120/82 - 129/86)  BP(mean): --  RR: 16 (12 Jun 2019 09:03) (16 - 18)  SpO2: 95% (12 Jun 2019 09:03) (91% - 97%)    General: Pt Alert and oriented, NAD  Back DSG C/D/I  HV x2 in place holding good suction, sanguinous output  Bilateral LE sensation intact  Bilateral LE strength 5/5  2+ DP  toes wwp  BCR

## 2019-06-13 LAB
ANION GAP SERPL CALC-SCNC: 9 MMOL/L — SIGNIFICANT CHANGE UP (ref 5–17)
BASOPHILS # BLD AUTO: 0.02 K/UL — SIGNIFICANT CHANGE UP (ref 0–0.2)
BASOPHILS NFR BLD AUTO: 0.1 % — SIGNIFICANT CHANGE UP (ref 0–2)
BUN SERPL-MCNC: 9 MG/DL — SIGNIFICANT CHANGE UP (ref 7–23)
CALCIUM SERPL-MCNC: 8.8 MG/DL — SIGNIFICANT CHANGE UP (ref 8.4–10.5)
CHLORIDE SERPL-SCNC: 112 MMOL/L — HIGH (ref 96–108)
CO2 SERPL-SCNC: 28 MMOL/L — SIGNIFICANT CHANGE UP (ref 22–31)
CREAT SERPL-MCNC: 0.5 MG/DL — SIGNIFICANT CHANGE UP (ref 0.5–1.3)
EOSINOPHIL # BLD AUTO: 0 K/UL — SIGNIFICANT CHANGE UP (ref 0–0.5)
EOSINOPHIL NFR BLD AUTO: 0 % — SIGNIFICANT CHANGE UP (ref 0–6)
GENTAMICIN SERPL-MCNC: 4 UG/ML
GENTAMICIN TROUGH SERPL-MCNC: 0.4 UG/ML — SIGNIFICANT CHANGE UP (ref 0–2)
GLUCOSE SERPL-MCNC: 144 MG/DL — HIGH (ref 70–99)
HCT VFR BLD CALC: 25.2 % — LOW (ref 34.5–45)
HCT VFR BLD CALC: 29.8 % — LOW (ref 34.5–45)
HGB BLD-MCNC: 7.8 G/DL — LOW (ref 11.5–15.5)
HGB BLD-MCNC: 9.5 G/DL — LOW (ref 11.5–15.5)
IMM GRANULOCYTES NFR BLD AUTO: 0.6 % — SIGNIFICANT CHANGE UP (ref 0–1.5)
LYMPHOCYTES # BLD AUTO: 14.6 % — SIGNIFICANT CHANGE UP (ref 13–44)
LYMPHOCYTES # BLD AUTO: 2.54 K/UL — SIGNIFICANT CHANGE UP (ref 1–3.3)
MCHC RBC-ENTMCNC: 29.7 PG — SIGNIFICANT CHANGE UP (ref 27–34)
MCHC RBC-ENTMCNC: 30 PG — SIGNIFICANT CHANGE UP (ref 27–34)
MCHC RBC-ENTMCNC: 31 GM/DL — LOW (ref 32–36)
MCHC RBC-ENTMCNC: 31.9 GM/DL — LOW (ref 32–36)
MCV RBC AUTO: 94 FL — SIGNIFICANT CHANGE UP (ref 80–100)
MCV RBC AUTO: 95.8 FL — SIGNIFICANT CHANGE UP (ref 80–100)
MONOCYTES # BLD AUTO: 1.21 K/UL — HIGH (ref 0–0.9)
MONOCYTES NFR BLD AUTO: 6.9 % — SIGNIFICANT CHANGE UP (ref 2–14)
NEUTROPHILS # BLD AUTO: 13.57 K/UL — HIGH (ref 1.8–7.4)
NEUTROPHILS NFR BLD AUTO: 77.8 % — HIGH (ref 43–77)
NRBC # BLD: 0 /100 WBCS — SIGNIFICANT CHANGE UP (ref 0–0)
NRBC # BLD: 0 /100 WBCS — SIGNIFICANT CHANGE UP (ref 0–0)
PLATELET # BLD AUTO: 155 K/UL — SIGNIFICANT CHANGE UP (ref 150–400)
PLATELET # BLD AUTO: 161 K/UL — SIGNIFICANT CHANGE UP (ref 150–400)
POTASSIUM SERPL-MCNC: 3.6 MMOL/L — SIGNIFICANT CHANGE UP (ref 3.5–5.3)
POTASSIUM SERPL-SCNC: 3.6 MMOL/L — SIGNIFICANT CHANGE UP (ref 3.5–5.3)
RBC # BLD: 2.63 M/UL — LOW (ref 3.8–5.2)
RBC # BLD: 3.17 M/UL — LOW (ref 3.8–5.2)
RBC # FLD: 16.7 % — HIGH (ref 10.3–14.5)
RBC # FLD: 17.8 % — HIGH (ref 10.3–14.5)
SODIUM SERPL-SCNC: 149 MMOL/L — HIGH (ref 135–145)
WBC # BLD: 15.9 K/UL — HIGH (ref 3.8–10.5)
WBC # BLD: 17.44 K/UL — HIGH (ref 3.8–10.5)
WBC # FLD AUTO: 15.9 K/UL — HIGH (ref 3.8–10.5)
WBC # FLD AUTO: 17.44 K/UL — HIGH (ref 3.8–10.5)

## 2019-06-13 PROCEDURE — 72170 X-RAY EXAM OF PELVIS: CPT | Mod: 26

## 2019-06-13 RX ORDER — MORPHINE SULFATE 50 MG/1
2 CAPSULE, EXTENDED RELEASE ORAL EVERY 4 HOURS
Refills: 0 | Status: DISCONTINUED | OUTPATIENT
Start: 2019-06-13 | End: 2019-06-14

## 2019-06-13 RX ORDER — POTASSIUM CHLORIDE 20 MEQ
40 PACKET (EA) ORAL ONCE
Refills: 0 | Status: COMPLETED | OUTPATIENT
Start: 2019-06-13 | End: 2019-06-13

## 2019-06-13 RX ORDER — SODIUM CHLORIDE 9 MG/ML
500 INJECTION, SOLUTION INTRAVENOUS ONCE
Refills: 0 | Status: DISCONTINUED | OUTPATIENT
Start: 2019-06-13 | End: 2019-06-14

## 2019-06-13 RX ADMIN — Medication 204 MILLIGRAM(S): at 05:32

## 2019-06-13 RX ADMIN — Medication 2000 MILLIGRAM(S): at 05:26

## 2019-06-13 RX ADMIN — Medication 100 MILLIGRAM(S): at 05:32

## 2019-06-13 RX ADMIN — HYDROMORPHONE HYDROCHLORIDE 1 MILLIGRAM(S): 2 INJECTION INTRAMUSCULAR; INTRAVENOUS; SUBCUTANEOUS at 11:14

## 2019-06-13 RX ADMIN — HYDROMORPHONE HYDROCHLORIDE 1 MILLIGRAM(S): 2 INJECTION INTRAMUSCULAR; INTRAVENOUS; SUBCUTANEOUS at 06:19

## 2019-06-13 RX ADMIN — Medication 2000 MILLIGRAM(S): at 21:19

## 2019-06-13 RX ADMIN — HYDROMORPHONE HYDROCHLORIDE 1 MILLIGRAM(S): 2 INJECTION INTRAMUSCULAR; INTRAVENOUS; SUBCUTANEOUS at 21:20

## 2019-06-13 RX ADMIN — OXYCODONE HYDROCHLORIDE 10 MILLIGRAM(S): 5 TABLET ORAL at 17:49

## 2019-06-13 RX ADMIN — OXYCODONE HYDROCHLORIDE 5 MILLIGRAM(S): 5 TABLET ORAL at 07:30

## 2019-06-13 RX ADMIN — HYDROMORPHONE HYDROCHLORIDE 1 MILLIGRAM(S): 2 INJECTION INTRAMUSCULAR; INTRAVENOUS; SUBCUTANEOUS at 02:23

## 2019-06-13 RX ADMIN — HYDROMORPHONE HYDROCHLORIDE 1 MILLIGRAM(S): 2 INJECTION INTRAMUSCULAR; INTRAVENOUS; SUBCUTANEOUS at 10:39

## 2019-06-13 RX ADMIN — Medication 100 MILLIGRAM(S): at 13:47

## 2019-06-13 RX ADMIN — FAMOTIDINE 20 MILLIGRAM(S): 10 INJECTION INTRAVENOUS at 10:04

## 2019-06-13 RX ADMIN — Medication 204 MILLIGRAM(S): at 14:20

## 2019-06-13 RX ADMIN — HYDROMORPHONE HYDROCHLORIDE 1 MILLIGRAM(S): 2 INJECTION INTRAMUSCULAR; INTRAVENOUS; SUBCUTANEOUS at 15:06

## 2019-06-13 RX ADMIN — Medication 40 MILLIEQUIVALENT(S): at 09:55

## 2019-06-13 RX ADMIN — HYDROMORPHONE HYDROCHLORIDE 1 MILLIGRAM(S): 2 INJECTION INTRAMUSCULAR; INTRAVENOUS; SUBCUTANEOUS at 22:00

## 2019-06-13 RX ADMIN — Medication 25 MILLIGRAM(S): at 05:50

## 2019-06-13 RX ADMIN — FAMOTIDINE 20 MILLIGRAM(S): 10 INJECTION INTRAVENOUS at 21:19

## 2019-06-13 RX ADMIN — Medication 100 MILLIGRAM(S): at 21:32

## 2019-06-13 RX ADMIN — Medication 20 MILLIGRAM(S): at 12:00

## 2019-06-13 RX ADMIN — ATOMOXETINE HYDROCHLORIDE 60 MILLIGRAM(S): 10 CAPSULE ORAL at 11:59

## 2019-06-13 RX ADMIN — HYDROMORPHONE HYDROCHLORIDE 1 MILLIGRAM(S): 2 INJECTION INTRAMUSCULAR; INTRAVENOUS; SUBCUTANEOUS at 03:00

## 2019-06-13 RX ADMIN — HYDROMORPHONE HYDROCHLORIDE 1 MILLIGRAM(S): 2 INJECTION INTRAMUSCULAR; INTRAVENOUS; SUBCUTANEOUS at 05:56

## 2019-06-13 RX ADMIN — Medication 2000 MILLIGRAM(S): at 13:40

## 2019-06-13 RX ADMIN — OXYCODONE HYDROCHLORIDE 5 MILLIGRAM(S): 5 TABLET ORAL at 07:45

## 2019-06-13 RX ADMIN — SENNA PLUS 2 TABLET(S): 8.6 TABLET ORAL at 21:20

## 2019-06-13 RX ADMIN — LIDOCAINE 1 PATCH: 4 CREAM TOPICAL at 17:13

## 2019-06-13 RX ADMIN — CYCLOBENZAPRINE HYDROCHLORIDE 5 MILLIGRAM(S): 10 TABLET, FILM COATED ORAL at 21:19

## 2019-06-13 NOTE — PROGRESS NOTE ADULT - ASSESSMENT
Mrs. Goldsmith is a 58 yo lady with newly diagnosed metastatic cancer, who presented to me with spine instability, unremitting pain and cord compression.   We elected to proceed to the operating room for several principles. To obtain spine stability, treat pain, and get tissue for histologic diagnosis.     She is s/p  T6-T12 instrumented fusion, Left T9 and T10 Rhizotomy, Partial T9 corpectomy, T9-10 laminectomy and costotransversectomy on Monday, and T2 decompression and partial corpectomy with C6-T6 instrumented fusion on Wednesday the 12th of June.      She is doing well. Pain is well controlled. Preoperative pain is gone.  Will plan to mobilize. Keep Lechuga in, drains in. AM labs and Chest xray.   TLSO when ambulatory.   In the mean time, ADAT. no chemical DVT prophylaxis.

## 2019-06-13 NOTE — PROGRESS NOTE ADULT - SUBJECTIVE AND OBJECTIVE BOX
Ortho    Procedure: PSF C6-T6 (6/12); Partial corpectomy T10; PSF T7-12 (6/10)   Surgeon: Carlo    Pt pain controlled after 2nd surgery. Neck discomfort endorsed but controlled.  Denies CP, SOB, N/V, numbness/tingling     Vital Signs Last 24 Hrs  T(C): 36.2 (12 Jun 2019 19:00), Max: 36.2 (12 Jun 2019 19:00)  T(F): 97.1 (12 Jun 2019 19:00), Max: 97.1 (12 Jun 2019 19:00)  HR: 132 (13 Jun 2019 12:00) (96 - 132)  BP: 106/70 (13 Jun 2019 12:00) (75/61 - 137/75)  BP(mean): 83 (13 Jun 2019 12:00) (64 - 97)  RR: 23 (13 Jun 2019 12:00) (7 - 28)  SpO2: 92% (13 Jun 2019 12:00) (91% - 98%)    General: Pt Alert and oriented, NAD  Neck and back DSG C/D/I  	paracervical tenderness L > R   	L D/B 4+, T/WE/WF/ stregth 5  	R D/B/T/WE/WF/  strength 5   	SILT C5-T1 bilat   	2+ rad bilat                            8.7    14.68 )-----------( 181      ( 12 Jun 2019 19:39 )             28.2   12 Jun 2019 19:39    149    |  112    |  9      ----------------------------<  172    4.3     |  29     |  0.73     Ca    8.5        12 Jun 2019 19:39    Drain output:    06-11-19 @ 07:01  -  06-12-19 @ 07:00  --------------------------------------------------------  OUT:    Drain: 210 mL    Drain: 260 mL  Total OUT: 470 mL      06-12-19 @ 07:01  -  06-12-19 @ 23:36  --------------------------------------------------------  OUT:  Total OUT: 0 mL      A/P: 59yFemale s/p PSF C6-T6 (6/12); Partial corpectomy T10; PSF T7-12 (6/10)   - Stable  - Pain Control  - strict BP control  - DVT ppx: SCDs  - Post op abx: ancef periop; gent until drains out   - PT, WBS: WBAT  - dispo: SICU for close HD monitoring     Ortho Pager [phone]

## 2019-06-13 NOTE — PROGRESS NOTE ADULT - ASSESSMENT
59F hx of hysterectomy, s/p R hip replacement, (2/2019), w/ metastatic spinal lesions of unknown primary, now s/p 2 stage tumor debulking via T9 corporectomy followed by 2 stage T6-T12 fusion (on 6/10) and C6-T6 fusion (on 6/12) via posterior approach. Transferred to SICU for HD monitoring.     Plan:   Neuro: dilaudid PRN; fentanyl PRN  CV: -160  Pulm: extubated, HF nasal cannula    GI/FEN: CLD, PPI  : urena (6/10 --)  Heme: transfuse prbc PRN  ID: periop abx while drains in: ancef (6/12 --), gentamicin (6/12 --)  Endo: ISS  ppx: SCDs, hold SQH until Friday  Wounds: hemovac x2, xeroform, gauze, tegaderm  PT: weight bearing as tolerated  Dispo: Rehab vs Home 59F hx of hysterectomy, s/p R hip replacement, (2/2019), w/ metastatic spinal lesions of unknown primary, now s/p 2 stage tumor debulking via T9 corporectomy followed by 2 stage T6-T12 fusion (on 6/10) and C6-T6 fusion (on 6/12) via posterior approach. Transferred to SICU for HD monitoring.     Plan:   Neuro: dilaudid PRN; fentanyl PRN, oxycodone PRN  CV: -160  Pulm: extubated, HF nasal cannula    GI/FEN: CLD, PPI  : urena (6/10 --)  Heme: transfuse prbc PRN  ID: periop abx while drains in: ancef (6/12 --), gentamicin (6/12 --)  Endo: ISS  ppx: SCDs, hold SQH until Friday  Wounds: hemovac x2, xeroform, gauze, tegaderm  PT: weight bearing as tolerated  Dispo: Rehab vs Home

## 2019-06-13 NOTE — PROGRESS NOTE ADULT - SUBJECTIVE AND OBJECTIVE BOX
Orthopaedic Spine Attending Note    S:  Petra is doing well, pain controlled, doing much better than after first operation. She is resting comfortably. Denies upper or lower extremity symptoms. Denies SOB, CP. Overall is doing well.     acetaminophen   Tablet .. 650 milliGRAM(s) Oral every 6 hours PRN  acetaminophen   Tablet .. 975 milliGRAM(s) Oral every 8 hours  albumin human  5% IVPB 250 milliLiter(s) IV Intermittent once  aluminum hydroxide/magnesium hydroxide/simethicone Suspension 30 milliLiter(s) Oral four times a day PRN  atoMOXetine 60 milliGRAM(s) Oral daily  BACItracin   Ointment 1 Application(s) Topical daily  bisacodyl Suppository 10 milliGRAM(s) Rectal daily PRN  BUpivacaine liposome 1.3% Injectable (no eMAR) 20 milliLiter(s) Local Injection once  cyclobenzaprine 5 milliGRAM(s) Oral three times a day PRN  docusate sodium 100 milliGRAM(s) Oral three times a day  famotidine    Tablet 20 milliGRAM(s) Oral every 12 hours  FIRST- Mouthwash  BLM 10 milliLiter(s) Swish and Spit every 6 hours  FLUoxetine 20 milliGRAM(s) Oral daily  heparin  Injectable 5000 Unit(s) SubCutaneous every 8 hours  HYDROmorphone  Injectable 1 milliGRAM(s) IV Push every 2 hours PRN  lidocaine   Patch 2 Patch Transdermal daily  magnesium hydroxide Suspension 30 milliLiter(s) Oral every 12 hours PRN  metoprolol succinate ER 50 milliGRAM(s) Oral daily  multivitamin/minerals 1 Tablet(s) Oral daily  naloxone Injectable 0.1 milliGRAM(s) IV Push every 3 minutes PRN  ondansetron Injectable 4 milliGRAM(s) IV Push every 6 hours PRN  oxyCODONE    IR 5 milliGRAM(s) Oral every 4 hours PRN  oxyCODONE    IR 10 milliGRAM(s) Oral every 4 hours PRN  polyethylene glycol 3350 17 Gram(s) Oral daily  senna 2 Tablet(s) Oral at bedtime      O:    General: Well-appearing adult Female lying supine; NAD; A&Ox3  Back:  Dressing CDI    Motor:  LUE- Delt/Bicep/Tricep/Wrist Ext/ 5/5 strength   RUE- Delt/Bicep/Tricep/Wrist Ext/ 5/5 strength  LLE- Hip Flex/Knee Ext/TA/EHL/GS 5/5 strength  RLE- Hip Flex/Knee Ext/TA/EHL/GS 5/5 strength  Sensory:  LUE- SILT C5-T1 dermatomes  RUE- SILT C5-T1 dermatomes  LLE- SILT L2-S1 dermatomes   RLE- SILT L2-S1 dermatomes  Vascular:  Feet WWP; DP 2+ bilaterally; Cap refill < 2 sec  Hands WWP; Radial 2+ bilaterally; Cap refill < 2 sec    Vital Signs Last 24 Hrs  T(C): 36.2 (12 Jun 2019 19:00), Max: 36.2 (12 Jun 2019 19:00)  T(F): 97.1 (12 Jun 2019 19:00), Max: 97.1 (12 Jun 2019 19:00)  HR: 132 (13 Jun 2019 12:00) (96 - 132)  BP: 106/70 (13 Jun 2019 12:00) (75/61 - 137/75)  BP(mean): 83 (13 Jun 2019 12:00) (64 - 97)  RR: 23 (13 Jun 2019 12:00) (7 - 28)  SpO2: 92% (13 Jun 2019 12:00) (91% - 98%)                          8.7    14.68 )-----------( 181      ( 12 Jun 2019 19:39 )             28.2   12 Jun 2019 19:39

## 2019-06-13 NOTE — PROGRESS NOTE ADULT - SUBJECTIVE AND OBJECTIVE BOX
Pain Management Progress Note - [facility] [phone]    HPI: Patient seen during morning rounds, in NAD. Seen sitting up in bed in PACU. States pain is currently an 8/10, Oxycodone and Dilaudid IV help when used.      Pertinent PMH: Pain at: _x__Back __x_Neck___Knee ___Hip ___Shoulder ___ Opioid tolerance    Pain is __x_ sharp ____dull ___burning __x_achy ___ Intensity: ____ mild ____mod _x___severe     Location __x___surgical site __x_cervical _____lumbar ____abd _____upper ext____lower ext __x__thoracic    Worse with __x__activity _x___movement _____physical therapy___ Rest    Improved with __x__medication __x__rest ____physical therapy    sodium chloride 0.9% Bolus  HYDROmorphone  Injectable  oxyCODONE    IR  oxyCODONE  ER Tablet  morphine  - Injectable  HYDROmorphone  Injectable  famotidine    Tablet  magnesium hydroxide Suspension  bisacodyl Suppository  docusate sodium  zaleplon  lactated ringers.  FLUoxetine  metoprolol succinate ER  atoMOXetine  morphine  - Injectable  HYDROmorphone PCA (1 mG/mL)  HYDROmorphone PCA (1 mG/mL) Rescue Clinician Bolus  naloxone Injectable  ondansetron Injectable  cyclobenzaprine  lactated ringers.  acetaminophen   Tablet ..  ceFAZolin   IVPB  docusate sodium  magnesium hydroxide Suspension  senna  gentamicin   IVPB  ceFAZolin  Injectable.  albumin human  5% IVPB  morphine  - Injectable  HYDROmorphone  Injectable  aluminum hydroxide/magnesium hydroxide/simethicone Suspension  ondansetron Injectable  polyethylene glycol 3350  bisacodyl Suppository  senna  docusate sodium  metoprolol succinate ER  zaleplon  FLUoxetine  famotidine    Tablet  cyclobenzaprine  gentamicin   IVPB  ceFAZolin  Injectable.  fentaNYL   Infusion  calcium gluconate IVPB  potassium chloride   Powder      ROS: Const:  _-__febrile   Eyes:___ENT:___CV: __-_chest pain  Resp: __-__sob  GI:___nausea ___vomiting __-__abd pain ___npo ___clears ___full diet __bm  :___ Musk: _x__pain ___spasm  Skin:___ Neuro:  ___sedation___confusion____ numbness __x_weakness ___paresthesia  Psych:___anxiety  Endo:___ Heme:___Allergy:___  __x__ all other systems reviewed and negative     06-13 @ 06:39032 mL/min/1.73M2      06-12 @ 19:3990 mL/min/1.73M2      Hemoglobin: 9.5 g/dL (06-13 @ 06:18)  Hemoglobin: 7.8 g/dL (06-13 @ 00:05)  Hemoglobin: 8.7 g/dL (06-12 @ 19:39)  Hemoglobin: 12.1 g/dL (06-12 @ 06:49)      T(C): 36.2 (06-12-19 @ 19:00), Max: 36.2 (06-12-19 @ 19:00)  HR: 122 (06-13-19 @ 11:00) (96 - 122)  BP: 113/81 (06-13-19 @ 11:00) (75/61 - 137/75)  RR: 18 (06-13-19 @ 11:00) (7 - 28)  SpO2: 94% (06-13-19 @ 11:00) (91% - 98%)  Wt(kg): --     PHYSICAL EXAM:  Gen Appearance: _x__no acute distress __x_appropriate    Neuro: _x__SILT feet____ EOM Intact Psych: AAOX_3_, __x_mood/affect appropriate        Eyes: __x_conjunctiva WNL  _____ Pupils equal and round        ENT: _x__ears and nose atraumatic_x__ Hearing grossly intact        Neck: __x_trachea midline, no visible masses ___thyroid without palpable mass    Resp: _x__Nml WOB____No tactile fremitus ___clear to auscultation    Cardio: __x_extremities free from edema __x__pedal pulses palpable    GI/Abdomen: ___soft ___x_ Nontender___x___Nondistended_____HSM    Lymphatic: ___no palpable nodes in neck  ___no palpable nodes calves and feet    Skin/Wound: ___Incision, __x_Dressing c/d/i,   ____surrounding tissues soft,  ___drain/chest tube present____    Muscular: EHL _5__/5  Gastrocnemius__5_/5    _x__absent clubbing/cyanosis         ASSESSMENT:  This is a 59y old Female with a history of:  INTRACTABLE PAIN, METASTATIC DISEASE  Handoff  MEWS Score  No pertinent past medical history  Thoracic spine tumor  Thoracic spine tumor  Intractable pain  Fusion of 7 to 12 spinal segments by posterior approach  Partial corpectomy of thoracic spine at single level  History of small bowel obstruction  BACK PAIN  5  Metastatic disease        Recommended Treatment PLAN:    1. Continue current regimen as patient is tolerating and responding well.   Plan discussed with Dr. Bravo

## 2019-06-13 NOTE — PROGRESS NOTE ADULT - SUBJECTIVE AND OBJECTIVE BOX
Neurology Follow up note    Name  YANETH QUESADA    HPI:  59F s/p R LISA (2/2019) p/w back pain which started earlier this year. Pt believed it to be related to her hip and went to see her PCP. She was diagnozed with anemia of chronic diease- further workup was done in the ER. She underwent a CT spine which demonstarted lytic lesions in spine concerning for metastatci disease. She went to see Dr. Matos for spine consultation who recommended surgery for tumor debulking and posterior spinal fusion. Currently denies numbness and tingilng. Endorses band like pain around mid-torso. No bowel or bladder incontinence. No gait imbalances. Of note, patient is a 34 year 0.5 pack smoker. (09 Jun 2019 13:07)      Interval History - improved strength in the LE- s/p surgery        REVIEW OF SYSTEMS    Vital Signs Last 24 Hrs  T(C): 36.2 (12 Jun 2019 19:00), Max: 36.2 (12 Jun 2019 19:00)  T(F): 97.1 (12 Jun 2019 19:00), Max: 97.1 (12 Jun 2019 19:00)  HR: 132 (13 Jun 2019 12:00) (96 - 132)  BP: 106/70 (13 Jun 2019 12:00) (75/61 - 137/75)  BP(mean): 83 (13 Jun 2019 12:00) (64 - 97)  RR: 23 (13 Jun 2019 12:00) (7 - 28)  SpO2: 92% (13 Jun 2019 12:00) (91% - 98%)    Physical Exam-     Mental Status- awake and alert    Cranial Nerves- full EOM    Gait and station- n/a    Motor- moves all r4 extremities above gravity    Reflexes- decreased in the LE- upgoing toes     Sensation- no sensory level    Coordination- no tremors    Vascular -no bruits    Medications  acetaminophen   Tablet .. 650 milliGRAM(s) Oral every 6 hours PRN  albumin human  5% IVPB 250 milliLiter(s) IV Intermittent once  aluminum hydroxide/magnesium hydroxide/simethicone Suspension 30 milliLiter(s) Oral four times a day PRN  atoMOXetine 60 milliGRAM(s) Oral daily  BACItracin   Ointment 1 Application(s) Topical daily  bisacodyl Suppository 10 milliGRAM(s) Rectal daily PRN  BUpivacaine liposome 1.3% Injectable (no eMAR) 20 milliLiter(s) Local Injection once  ceFAZolin  Injectable. 2000 milliGRAM(s) IV Push every 8 hours  cyclobenzaprine 5 milliGRAM(s) Oral three times a day PRN  docusate sodium 100 milliGRAM(s) Oral three times a day  famotidine    Tablet 20 milliGRAM(s) Oral every 12 hours  FLUoxetine 20 milliGRAM(s) Oral daily  gentamicin   IVPB 80 milliGRAM(s) IV Intermittent every 8 hours  HYDROmorphone  Injectable 1 milliGRAM(s) IV Push every 2 hours PRN  lactated ringers. 1000 milliLiter(s) IV Continuous <Continuous>  lidocaine   Patch 2 Patch Transdermal daily  magnesium hydroxide Suspension 30 milliLiter(s) Oral every 12 hours PRN  metoprolol succinate ER 25 milliGRAM(s) Oral daily  naloxone Injectable 0.1 milliGRAM(s) IV Push every 3 minutes PRN  ondansetron Injectable 4 milliGRAM(s) IV Push every 6 hours PRN  oxyCODONE    IR 5 milliGRAM(s) Oral every 4 hours PRN  oxyCODONE    IR 10 milliGRAM(s) Oral every 4 hours PRN  polyethylene glycol 3350 17 Gram(s) Oral daily  senna 2 Tablet(s) Oral at bedtime  zaleplon 5 milliGRAM(s) Oral at bedtime PRN      Lab      Radiology    Assessment- Thoracic and lumbar radiculopathy    Plan improvement since surgery - will follow

## 2019-06-13 NOTE — PROGRESS NOTE ADULT - SUBJECTIVE AND OBJECTIVE BOX
INTERVAL HPI/OVERNIGHT EVENTS: **INCOMPLETE**      MEDICATIONS  (STANDING):  albumin human  5% IVPB 250 milliLiter(s) IV Intermittent once  atoMOXetine 60 milliGRAM(s) Oral daily  BACItracin   Ointment 1 Application(s) Topical daily  BUpivacaine liposome 1.3% Injectable (no eMAR) 20 milliLiter(s) Local Injection once  ceFAZolin  Injectable. 2000 milliGRAM(s) IV Push every 8 hours  dexmedetomidine Infusion 0.7 MICROgram(s)/kG/Hr (13.492 mL/Hr) IV Continuous <Continuous>  docusate sodium 100 milliGRAM(s) Oral three times a day  famotidine    Tablet 20 milliGRAM(s) Oral every 12 hours  fentaNYL   Infusion 0.5 MICROgram(s)/kG/Hr (3.855 mL/Hr) IV Continuous <Continuous>  FLUoxetine 20 milliGRAM(s) Oral daily  gentamicin   IVPB 80 milliGRAM(s) IV Intermittent every 8 hours  lactated ringers. 1000 milliLiter(s) (100 mL/Hr) IV Continuous <Continuous>  lidocaine   Patch 2 Patch Transdermal daily  metoprolol succinate ER 25 milliGRAM(s) Oral daily  polyethylene glycol 3350 17 Gram(s) Oral daily  senna 2 Tablet(s) Oral at bedtime    MEDICATIONS  (PRN):  acetaminophen   Tablet .. 650 milliGRAM(s) Oral every 6 hours PRN Temp greater or equal to 38C (100.4F)  aluminum hydroxide/magnesium hydroxide/simethicone Suspension 30 milliLiter(s) Oral four times a day PRN Indigestion  bisacodyl Suppository 10 milliGRAM(s) Rectal daily PRN If no bowel movement by POD#2  cyclobenzaprine 5 milliGRAM(s) Oral three times a day PRN Muscle Spasm  HYDROmorphone  Injectable 1 milliGRAM(s) IV Push every 2 hours PRN breakthrough pain  magnesium hydroxide Suspension 30 milliLiter(s) Oral every 12 hours PRN Constipation  naloxone Injectable 0.1 milliGRAM(s) IV Push every 3 minutes PRN For ANY of the following changes in patient status:  A. RR LESS THAN 10 breaths per minute, B. Oxygen saturation LESS THAN 90%, C. Sedation score of 6  ondansetron Injectable 4 milliGRAM(s) IV Push every 6 hours PRN Nausea  oxyCODONE    IR 5 milliGRAM(s) Oral every 4 hours PRN Mild Pain (1 - 3)  oxyCODONE    IR 10 milliGRAM(s) Oral every 4 hours PRN Moderate Pain (4 - 6)  zaleplon 5 milliGRAM(s) Oral at bedtime PRN Insomnia      Drug Dosing Weight  Height (cm): 171.4 (12 Jun 2019 00:23)  Weight (kg): 77.1 (12 Jun 2019 00:23)  BMI (kg/m2): 26.2 (12 Jun 2019 00:23)  BSA (m2): 1.9 (12 Jun 2019 00:23)    PAST MEDICAL & SURGICAL HISTORY:  No pertinent past medical history  History of small bowel obstruction      ICU Vital Signs Last 24 Hrs  T(C): 36.2 (12 Jun 2019 19:00), Max: 37.2 (12 Jun 2019 09:03)  T(F): 97.1 (12 Jun 2019 19:00), Max: 99 (12 Jun 2019 09:03)  HR: 114 (13 Jun 2019 05:06) (96 - 114)  BP: 115/72 (13 Jun 2019 05:06) (75/61 - 137/75)  BP(mean): 86 (13 Jun 2019 05:06) (64 - 97)  ABP: 126/60 (13 Jun 2019 05:06) (90/54 - 140/66)  ABP(mean): 84 (13 Jun 2019 05:06) (68 - 100)  RR: 11 (13 Jun 2019 05:06) (7 - 21)  SpO2: 92% (13 Jun 2019 05:06) (91% - 98%)      ABG - ( 12 Jun 2019 21:30 )  pH, Arterial: 7.46  pH, Blood: x     /  pCO2: 39    /  pO2: 118   / HCO3: 27    / Base Excess: 3.0   /  SaO2: 98          11 Jun 2019 07:01  -  12 Jun 2019 07:00  --------------------------------------------------------  IN:    lactated ringers.: 1520 mL    lactated ringers.: 500 mL    Oral Fluid: 350 mL  Total IN: 2370 mL    OUT:    Drain: 210 mL    Drain: 260 mL    Indwelling Catheter - Urethral: 67264 mL  Total OUT: 66815 mL    Total NET: -36490 mL      12 Jun 2019 07:01  -  13 Jun 2019 06:56  --------------------------------------------------------  IN:    Albumin 5%  - 500 mL: 500 mL    IV PiggyBack: 200 mL    lactated ringers.: 180 mL    lactated ringers.: 800 mL    Oral Fluid: 2600 mL    Packed Red Blood Cells: 300 mL  Total IN: 4580 mL    OUT:    Drain: 115 mL    Drain: 425 mL    Indwelling Catheter - Urethral: 4080 mL  Total OUT: 4620 mL    Total NET: -40 mL    PHYSICAL EXAM:  General: NAD, resting comfortably in bed. Well developed, well groomed  NEURO: AAOx3, CN II-XII grossly intact, EOMI, follows commands  HEENT: PERRL, MMM  CV: RRR, no MRG  PULM: CTAB, nonlabored breathing, no respiratory distress  ABD: soft, NT/ND. No rebound, no guarding, no tympany. Incisions CDI.   : Lechuga in place, normal genitalia  EXTREM: WWP, no edema, no calf tenderness  VASC: No cyanosis, pallor. Palpable radial and PT bilaterally  SKIN: No rashes noted  PSYCH: Appropriate affect, answers questions appropriately        LABS:  CBC Full  -  ( 13 Jun 2019 06:18 )  WBC Count : 17.44 K/uL  RBC Count : 3.17 M/uL  Hemoglobin : 9.5 g/dL  Hematocrit : 29.8 %  Platelet Count - Automated : 161 K/uL  Mean Cell Volume : 94.0 fl  Mean Cell Hemoglobin : 30.0 pg  Mean Cell Hemoglobin Concentration : 31.9 gm/dL  Auto Neutrophil # : 13.57 K/uL  Auto Lymphocyte # : 2.54 K/uL  Auto Monocyte # : 1.21 K/uL  Auto Eosinophil # : 0.00 K/uL  Auto Basophil # : 0.02 K/uL  Auto Neutrophil % : 77.8 %  Auto Lymphocyte % : 14.6 %  Auto Monocyte % : 6.9 %  Auto Eosinophil % : 0.0 %  Auto Basophil % : 0.1 %    06-13    149<H>  |  112<H>  |  9   ----------------------------<  144<H>  3.6   |  28  |  0.50    Ca    8.8      13 Jun 2019 06:18            RADIOLOGY & ADDITIONAL STUDIES: INTERVAL HPI/OVERNIGHT EVENTS:   Patient reports that her pain is currently controlled. Patient reports that her throat is dry but it is too painful to take a big cough so she is just constantly clearing her throat. Patient reports that she has pain that is band like around her abdomen but that this was present preoperatively and she attributes this to her spinal tumors and its improved since the OR. Patient denies numbness or tingling, chest pain, shortness of breath, abdominal pain, fever, and chills. Patient is passing flatus and her last bowel movement was Monday 6/10.      MEDICATIONS  (STANDING):  albumin human  5% IVPB 250 milliLiter(s) IV Intermittent once  atoMOXetine 60 milliGRAM(s) Oral daily  BACItracin   Ointment 1 Application(s) Topical daily  BUpivacaine liposome 1.3% Injectable (no eMAR) 20 milliLiter(s) Local Injection once  ceFAZolin  Injectable. 2000 milliGRAM(s) IV Push every 8 hours  dexmedetomidine Infusion 0.7 MICROgram(s)/kG/Hr (13.492 mL/Hr) IV Continuous <Continuous>  docusate sodium 100 milliGRAM(s) Oral three times a day  famotidine    Tablet 20 milliGRAM(s) Oral every 12 hours  fentaNYL   Infusion 0.5 MICROgram(s)/kG/Hr (3.855 mL/Hr) IV Continuous <Continuous>  FLUoxetine 20 milliGRAM(s) Oral daily  gentamicin   IVPB 80 milliGRAM(s) IV Intermittent every 8 hours  lactated ringers. 1000 milliLiter(s) (100 mL/Hr) IV Continuous <Continuous>  lidocaine   Patch 2 Patch Transdermal daily  metoprolol succinate ER 25 milliGRAM(s) Oral daily  polyethylene glycol 3350 17 Gram(s) Oral daily  senna 2 Tablet(s) Oral at bedtime    MEDICATIONS  (PRN):  acetaminophen   Tablet .. 650 milliGRAM(s) Oral every 6 hours PRN Temp greater or equal to 38C (100.4F)  aluminum hydroxide/magnesium hydroxide/simethicone Suspension 30 milliLiter(s) Oral four times a day PRN Indigestion  bisacodyl Suppository 10 milliGRAM(s) Rectal daily PRN If no bowel movement by POD#2  cyclobenzaprine 5 milliGRAM(s) Oral three times a day PRN Muscle Spasm  HYDROmorphone  Injectable 1 milliGRAM(s) IV Push every 2 hours PRN breakthrough pain  magnesium hydroxide Suspension 30 milliLiter(s) Oral every 12 hours PRN Constipation  naloxone Injectable 0.1 milliGRAM(s) IV Push every 3 minutes PRN For ANY of the following changes in patient status:  A. RR LESS THAN 10 breaths per minute, B. Oxygen saturation LESS THAN 90%, C. Sedation score of 6  ondansetron Injectable 4 milliGRAM(s) IV Push every 6 hours PRN Nausea  oxyCODONE    IR 5 milliGRAM(s) Oral every 4 hours PRN Mild Pain (1 - 3)  oxyCODONE    IR 10 milliGRAM(s) Oral every 4 hours PRN Moderate Pain (4 - 6)  zaleplon 5 milliGRAM(s) Oral at bedtime PRN Insomnia      Drug Dosing Weight  Height (cm): 171.4 (12 Jun 2019 00:23)  Weight (kg): 77.1 (12 Jun 2019 00:23)  BMI (kg/m2): 26.2 (12 Jun 2019 00:23)  BSA (m2): 1.9 (12 Jun 2019 00:23)    PAST MEDICAL & SURGICAL HISTORY:  No pertinent past medical history  History of small bowel obstruction      ICU Vital Signs Last 24 Hrs  T(C): 36.2 (12 Jun 2019 19:00), Max: 37.2 (12 Jun 2019 09:03)  T(F): 97.1 (12 Jun 2019 19:00), Max: 99 (12 Jun 2019 09:03)  HR: 114 (13 Jun 2019 05:06) (96 - 114)  BP: 115/72 (13 Jun 2019 05:06) (75/61 - 137/75)  BP(mean): 86 (13 Jun 2019 05:06) (64 - 97)  ABP: 126/60 (13 Jun 2019 05:06) (90/54 - 140/66)  ABP(mean): 84 (13 Jun 2019 05:06) (68 - 100)  RR: 11 (13 Jun 2019 05:06) (7 - 21)  SpO2: 92% (13 Jun 2019 05:06) (91% - 98%)      ABG - ( 12 Jun 2019 21:30 )  pH, Arterial: 7.46  pH, Blood: x     /  pCO2: 39    /  pO2: 118   / HCO3: 27    / Base Excess: 3.0   /  SaO2: 98          11 Jun 2019 07:01  -  12 Jun 2019 07:00  --------------------------------------------------------  IN:    lactated ringers.: 1520 mL    lactated ringers.: 500 mL    Oral Fluid: 350 mL  Total IN: 2370 mL    OUT:    Drain: 210 mL    Drain: 260 mL    Indwelling Catheter - Urethral: 35083 mL  Total OUT: 22595 mL    Total NET: -06300 mL      12 Jun 2019 07:01  -  13 Jun 2019 06:56  --------------------------------------------------------  IN:    Albumin 5%  - 500 mL: 500 mL    IV PiggyBack: 200 mL    lactated ringers.: 180 mL    lactated ringers.: 800 mL    Oral Fluid: 2600 mL    Packed Red Blood Cells: 300 mL  Total IN: 4580 mL    OUT:    Drain: 115 mL    Drain: 425 mL    Indwelling Catheter - Urethral: 4080 mL  Total OUT: 4620 mL    Total NET: -40 mL    PHYSICAL EXAM:  General: NAD, resting comfortably in bed. Well developed, well groomed. small superfical abrasions on both cheeks secondary to nasal canula tubing.  NEURO: AAOx3, CN II-XII grossly intact, EOMI, follows commands  HEENT: PERRL, MMM  CV: RRR, no MRG  PULM: CTAB, nonlabored breathing, no respiratory distress  ABD: soft, NT/ND. No rebound, no guarding, no tympany.   : Lechuga in place, normal genitalia  EXTREM: WWP, no edema, no calf tenderness  SPINE: incision clean, dry, and intact with dressing in place. hemovac x 2 with serosanguinous drainage.  VASC: No cyanosis, pallor. Palpable radial and PT bilaterally  SKIN: No rashes noted  PSYCH: Appropriate affect, answers questions appropriately        LABS:  CBC Full  -  ( 13 Jun 2019 06:18 )  WBC Count : 17.44 K/uL  RBC Count : 3.17 M/uL  Hemoglobin : 9.5 g/dL  Hematocrit : 29.8 %  Platelet Count - Automated : 161 K/uL  Mean Cell Volume : 94.0 fl  Mean Cell Hemoglobin : 30.0 pg  Mean Cell Hemoglobin Concentration : 31.9 gm/dL  Auto Neutrophil # : 13.57 K/uL  Auto Lymphocyte # : 2.54 K/uL  Auto Monocyte # : 1.21 K/uL  Auto Eosinophil # : 0.00 K/uL  Auto Basophil # : 0.02 K/uL  Auto Neutrophil % : 77.8 %  Auto Lymphocyte % : 14.6 %  Auto Monocyte % : 6.9 %  Auto Eosinophil % : 0.0 %  Auto Basophil % : 0.1 %    06-13    149<H>  |  112<H>  |  9   ----------------------------<  144<H>  3.6   |  28  |  0.50    Ca    8.8      13 Jun 2019 06:18            RADIOLOGY & ADDITIONAL STUDIES:

## 2019-06-14 LAB
ANION GAP SERPL CALC-SCNC: 10 MMOL/L — SIGNIFICANT CHANGE UP (ref 5–17)
BASOPHILS # BLD AUTO: 0.02 K/UL — SIGNIFICANT CHANGE UP (ref 0–0.2)
BASOPHILS NFR BLD AUTO: 0.1 % — SIGNIFICANT CHANGE UP (ref 0–2)
BUN SERPL-MCNC: 6 MG/DL — LOW (ref 7–23)
CALCIUM SERPL-MCNC: 8.7 MG/DL — SIGNIFICANT CHANGE UP (ref 8.4–10.5)
CHLORIDE SERPL-SCNC: 107 MMOL/L — SIGNIFICANT CHANGE UP (ref 96–108)
CO2 SERPL-SCNC: 26 MMOL/L — SIGNIFICANT CHANGE UP (ref 22–31)
CREAT SERPL-MCNC: 0.58 MG/DL — SIGNIFICANT CHANGE UP (ref 0.5–1.3)
EOSINOPHIL # BLD AUTO: 0.03 K/UL — SIGNIFICANT CHANGE UP (ref 0–0.5)
EOSINOPHIL NFR BLD AUTO: 0.2 % — SIGNIFICANT CHANGE UP (ref 0–6)
GLUCOSE SERPL-MCNC: 121 MG/DL — HIGH (ref 70–99)
HCT VFR BLD CALC: 30.2 % — LOW (ref 34.5–45)
HGB BLD-MCNC: 9.6 G/DL — LOW (ref 11.5–15.5)
IMM GRANULOCYTES NFR BLD AUTO: 1 % — SIGNIFICANT CHANGE UP (ref 0–1.5)
LYMPHOCYTES # BLD AUTO: 1.99 K/UL — SIGNIFICANT CHANGE UP (ref 1–3.3)
LYMPHOCYTES # BLD AUTO: 13.3 % — SIGNIFICANT CHANGE UP (ref 13–44)
MAGNESIUM SERPL-MCNC: 1.9 MG/DL — SIGNIFICANT CHANGE UP (ref 1.6–2.6)
MCHC RBC-ENTMCNC: 29.6 PG — SIGNIFICANT CHANGE UP (ref 27–34)
MCHC RBC-ENTMCNC: 31.8 GM/DL — LOW (ref 32–36)
MCV RBC AUTO: 93.2 FL — SIGNIFICANT CHANGE UP (ref 80–100)
MONOCYTES # BLD AUTO: 0.94 K/UL — HIGH (ref 0–0.9)
MONOCYTES NFR BLD AUTO: 6.3 % — SIGNIFICANT CHANGE UP (ref 2–14)
NEUTROPHILS # BLD AUTO: 11.78 K/UL — HIGH (ref 1.8–7.4)
NEUTROPHILS NFR BLD AUTO: 79.1 % — HIGH (ref 43–77)
NRBC # BLD: 0 /100 WBCS — SIGNIFICANT CHANGE UP (ref 0–0)
PHOSPHATE SERPL-MCNC: 2.1 MG/DL — LOW (ref 2.5–4.5)
PLATELET # BLD AUTO: 155 K/UL — SIGNIFICANT CHANGE UP (ref 150–400)
POTASSIUM SERPL-MCNC: 3.5 MMOL/L — SIGNIFICANT CHANGE UP (ref 3.5–5.3)
POTASSIUM SERPL-SCNC: 3.5 MMOL/L — SIGNIFICANT CHANGE UP (ref 3.5–5.3)
RBC # BLD: 3.24 M/UL — LOW (ref 3.8–5.2)
RBC # FLD: 17 % — HIGH (ref 10.3–14.5)
SODIUM SERPL-SCNC: 143 MMOL/L — SIGNIFICANT CHANGE UP (ref 135–145)
WBC # BLD: 14.91 K/UL — HIGH (ref 3.8–10.5)
WBC # FLD AUTO: 14.91 K/UL — HIGH (ref 3.8–10.5)

## 2019-06-14 PROCEDURE — 71045 X-RAY EXAM CHEST 1 VIEW: CPT | Mod: 26

## 2019-06-14 PROCEDURE — 99233 SBSQ HOSP IP/OBS HIGH 50: CPT

## 2019-06-14 RX ORDER — ACETAMINOPHEN 500 MG
975 TABLET ORAL EVERY 8 HOURS
Refills: 0 | Status: DISCONTINUED | OUTPATIENT
Start: 2019-06-14 | End: 2019-06-21

## 2019-06-14 RX ORDER — HEPARIN SODIUM 5000 [USP'U]/ML
5000 INJECTION INTRAVENOUS; SUBCUTANEOUS EVERY 12 HOURS
Refills: 0 | Status: DISCONTINUED | OUTPATIENT
Start: 2019-06-14 | End: 2019-06-16

## 2019-06-14 RX ADMIN — OXYCODONE HYDROCHLORIDE 10 MILLIGRAM(S): 5 TABLET ORAL at 08:44

## 2019-06-14 RX ADMIN — MORPHINE SULFATE 2 MILLIGRAM(S): 50 CAPSULE, EXTENDED RELEASE ORAL at 05:00

## 2019-06-14 RX ADMIN — LIDOCAINE 2 PATCH: 4 CREAM TOPICAL at 06:47

## 2019-06-14 RX ADMIN — Medication 100 MILLIGRAM(S): at 06:30

## 2019-06-14 RX ADMIN — HYDROMORPHONE HYDROCHLORIDE 1 MILLIGRAM(S): 2 INJECTION INTRAMUSCULAR; INTRAVENOUS; SUBCUTANEOUS at 13:38

## 2019-06-14 RX ADMIN — POLYETHYLENE GLYCOL 3350 17 GRAM(S): 17 POWDER, FOR SOLUTION ORAL at 12:51

## 2019-06-14 RX ADMIN — Medication 100 MILLIGRAM(S): at 13:46

## 2019-06-14 RX ADMIN — Medication 2000 MILLIGRAM(S): at 13:41

## 2019-06-14 RX ADMIN — Medication 204 MILLIGRAM(S): at 01:25

## 2019-06-14 RX ADMIN — FAMOTIDINE 20 MILLIGRAM(S): 10 INJECTION INTRAVENOUS at 12:52

## 2019-06-14 RX ADMIN — SODIUM CHLORIDE 100 MILLILITER(S): 9 INJECTION, SOLUTION INTRAVENOUS at 00:16

## 2019-06-14 RX ADMIN — Medication 204 MILLIGRAM(S): at 18:00

## 2019-06-14 RX ADMIN — Medication 2000 MILLIGRAM(S): at 21:01

## 2019-06-14 RX ADMIN — SODIUM CHLORIDE 1000 MILLILITER(S): 9 INJECTION, SOLUTION INTRAVENOUS at 02:00

## 2019-06-14 RX ADMIN — OXYCODONE HYDROCHLORIDE 10 MILLIGRAM(S): 5 TABLET ORAL at 17:00

## 2019-06-14 RX ADMIN — Medication 1 APPLICATION(S): at 13:40

## 2019-06-14 RX ADMIN — HYDROMORPHONE HYDROCHLORIDE 1 MILLIGRAM(S): 2 INJECTION INTRAMUSCULAR; INTRAVENOUS; SUBCUTANEOUS at 06:30

## 2019-06-14 RX ADMIN — HYDROMORPHONE HYDROCHLORIDE 1 MILLIGRAM(S): 2 INJECTION INTRAMUSCULAR; INTRAVENOUS; SUBCUTANEOUS at 03:14

## 2019-06-14 RX ADMIN — HYDROMORPHONE HYDROCHLORIDE 1 MILLIGRAM(S): 2 INJECTION INTRAMUSCULAR; INTRAVENOUS; SUBCUTANEOUS at 03:50

## 2019-06-14 RX ADMIN — FAMOTIDINE 20 MILLIGRAM(S): 10 INJECTION INTRAVENOUS at 21:01

## 2019-06-14 RX ADMIN — Medication 20 MILLIGRAM(S): at 13:24

## 2019-06-14 RX ADMIN — Medication 975 MILLIGRAM(S): at 22:01

## 2019-06-14 RX ADMIN — OXYCODONE HYDROCHLORIDE 10 MILLIGRAM(S): 5 TABLET ORAL at 09:15

## 2019-06-14 RX ADMIN — ATOMOXETINE HYDROCHLORIDE 60 MILLIGRAM(S): 10 CAPSULE ORAL at 12:56

## 2019-06-14 RX ADMIN — Medication 25 MILLIGRAM(S): at 06:30

## 2019-06-14 RX ADMIN — Medication 2000 MILLIGRAM(S): at 06:30

## 2019-06-14 RX ADMIN — MORPHINE SULFATE 2 MILLIGRAM(S): 50 CAPSULE, EXTENDED RELEASE ORAL at 04:32

## 2019-06-14 RX ADMIN — Medication 975 MILLIGRAM(S): at 21:01

## 2019-06-14 RX ADMIN — OXYCODONE HYDROCHLORIDE 10 MILLIGRAM(S): 5 TABLET ORAL at 20:59

## 2019-06-14 RX ADMIN — HYDROMORPHONE HYDROCHLORIDE 1 MILLIGRAM(S): 2 INJECTION INTRAMUSCULAR; INTRAVENOUS; SUBCUTANEOUS at 07:00

## 2019-06-14 RX ADMIN — MORPHINE SULFATE 2 MILLIGRAM(S): 50 CAPSULE, EXTENDED RELEASE ORAL at 00:17

## 2019-06-14 RX ADMIN — HYDROMORPHONE HYDROCHLORIDE 1 MILLIGRAM(S): 2 INJECTION INTRAMUSCULAR; INTRAVENOUS; SUBCUTANEOUS at 14:00

## 2019-06-14 RX ADMIN — OXYCODONE HYDROCHLORIDE 10 MILLIGRAM(S): 5 TABLET ORAL at 21:59

## 2019-06-14 RX ADMIN — MORPHINE SULFATE 2 MILLIGRAM(S): 50 CAPSULE, EXTENDED RELEASE ORAL at 00:55

## 2019-06-14 RX ADMIN — OXYCODONE HYDROCHLORIDE 10 MILLIGRAM(S): 5 TABLET ORAL at 16:31

## 2019-06-14 RX ADMIN — Medication 204 MILLIGRAM(S): at 12:52

## 2019-06-14 NOTE — PROVIDER CONTACT NOTE (OTHER) - ACTION/TREATMENT ORDERED:
Will reportedly adjust HR parameter and NC ordered
Provider notified. Lactated ringers stopped. Will continue to monitor.
Provider notified. Pelvis X-ray and LR bolus to be ordered. Will continue to monitor.

## 2019-06-14 NOTE — PROGRESS NOTE ADULT - SUBJECTIVE AND OBJECTIVE BOX
Pain Management Progress Note - [facility] [phone]      HPI: Patient seen and examined today, patient in nad. Patient with x2 month of thoracic spine pain, found to have metastatic disease s/p  tumor debulking at T9-T10 and T2, nos s/p PSF C6-T6. Patient reports neck, thoracic spine and surgical site pain, pain managed with current pain medication regimen.  Patient Axox3, denies n,v, no s/s of oversedation.       Pain is ___ sharp _x___dull ___burning _x__achy ___ Intensity: ____ mild _x__mod _x__severe     Location __x__surgical site ____cervical _____lumbar ____abd ____upper ext____lower ext  __x_ thoracic pain    Worse with __x__activity _x___movement _____physical therapy___ Rest    Improved with _x___medication _x___rest ____physical therapy      sodium chloride 0.9% Bolus  HYDROmorphone  Injectable  oxyCODONE    IR  oxyCODONE    IR  oxyCODONE  ER Tablet  morphine  - Injectable  HYDROmorphone  Injectable  famotidine    Tablet  magnesium hydroxide Suspension  bisacodyl Suppository  docusate sodium  zaleplon  lactated ringers.  FLUoxetine  metoprolol succinate ER  atoMOXetine  morphine  - Injectable  HYDROmorphone PCA (1 mG/mL)  HYDROmorphone PCA (1 mG/mL) Rescue Clinician Bolus  naloxone Injectable  ondansetron Injectable  BUpivacaine liposome 1.3% Injectable (no eMAR)  lactated ringers.  acetaminophen   Tablet ..  ceFAZolin   IVPB  docusate sodium  magnesium hydroxide Suspension  senna  gentamicin   IVPB  ceFAZolin  Injectable.  albumin human  5% IVPB  ceFAZolin  Injectable.  oxyCODONE    IR  HYDROmorphone  Injectable  lidocaine   Patch  BACItracin   Ointment  lactated ringers.  acetaminophen   Tablet ..  oxyCODONE    IR  oxyCODONE    IR  morphine  - Injectable  HYDROmorphone  Injectable  aluminum hydroxide/magnesium hydroxide/simethicone Suspension  ondansetron Injectable  polyethylene glycol 3350  bisacodyl Suppository  senna  docusate sodium  metoprolol succinate ER  zaleplon  FLUoxetine  famotidine    Tablet  cyclobenzaprine  dexmedetomidine Infusion  dexmedetomidine Infusion  gentamicin   IVPB  ceFAZolin  Injectable.  albumin human  5% IVPB  albumin human  5% IVPB  fentaNYL   Infusion  calcium gluconate IVPB  fentaNYL   Infusion  lactated ringers Bolus  morphine  - Injectable      ROS: Const:  ___febrile   Eyes:___ENT:___CV: _-__chest pain  Resp: __-__sob  GI:_-__nausea _-__vomiting _-__abd pain ___npo ___clears _x_full diet __bm  :___ Musk: _x__pain _-__spasm  Skin:___ Neuro:  _-__bodctjeu_-__itmlgooyv_-__ numbness ___weakness __x_paresth  Psych:__anxiety  Endo:___ Heme:___Allergy:_________, _x__all others reviewed and negative      PAST MEDICAL & SURGICAL HISTORY:  No pertinent past medical history  History of small bowel obstruction        06-14 @ 07:18718 mL/min/1.73M2      Hemoglobin: 9.6 g/dL (06-14 @ 07:15)  Hemoglobin: 9.5 g/dL (06-13 @ 06:18)  Hemoglobin: 7.8 g/dL (06-13 @ 00:05)  Hemoglobin: 8.7 g/dL (06-12 @ 19:39)      T(C): 36.4 (06-14-19 @ 09:40), Max: 98.2 (06-13-19 @ 14:00)  HR: 112 (06-14-19 @ 09:40) (112 - 134)  BP: 121/78 (06-14-19 @ 09:40) (106/66 - 124/69)  RR: 17 (06-14-19 @ 09:40) (12 - 23)  SpO2: 100% (06-14-19 @ 09:40) (91% - 100%)  Wt(kg): --       PHYSICAL EXAM:  Gen Appearance: __x_no acute distress _x__appropriate        Neuro: _x__SILT feet____ EOM Intact Psych: AAOX_3_, _x__mood/affect appropriate        Eyes: __x_conjunctiva WNL  ___x__ Pupils equal and round        ENT: x___ears and nose atraumatic_x__ Hearing grossly intact        Neck: __x_trachea midline, no visible masses ___thyroid without palpable mass    Resp: x___Nml WOB____No tactile fremitus ___clear to auscultation    Cardio: _x__extremities free from edema __x__pedal pulses palpable    GI/Abdomen: __x_soft __x___ Nontender___x___Nondistended_____HSM    Lymphatic: ___no palpable nodes in neck  ___no palpable nodes calves and feet    Skin/Wound: ___Incision, _x__Dressing c/d/i,   ____surrounding tissues soft,  __x_drain/chest tube present____    Muscular: EHL _5__/5  Gastrocnemius_5__/5    ___absent clubbing/cyanosis        ASSESSMENT: This is a 59y old Female with a history of intractable pain and metastatic disease, scheduled for tumor debulking at T9-T10 and T2, patient now s/p C6-T6, pain managed with current pain medication regimen.      Recommended Treatment PLAN:  1. Oxycodone 5-10mg PO Q4 prn moderate to severe pain  2. Dilaudid 1mg Q2h IVP prn breakthrough pain  3. Flexeril 5mg Po Q8h prn muscle spasms  4. x2 lidocaine patch to affected area, 12hours on 12hours off  Plan discussed with Dr. Handy at bedside

## 2019-06-14 NOTE — PHYSICAL THERAPY INITIAL EVALUATION ADULT - PERTINENT HX OF CURRENT PROBLEM, REHAB EVAL
59F s/p R LISA (2/2019) p/w back pain which started earlier this year. Pt believed it to be related to her hip and went to see her PCP. She was diagnozed with anemia of chronic diease- further workup was done in the ER. She underwent a CT spine which demonstarted lytic lesions in spine concerning for metastatci disease.Currently denies numbness and tingilng. Endorses band like pain around mid-torso. No bowel or bladder incontinence.

## 2019-06-14 NOTE — PROGRESS NOTE ADULT - SUBJECTIVE AND OBJECTIVE BOX
CC: No overnight events.  Feeling well, nicanor when she moves  Tolerates PO diet (+); Urination via Lechuga (+); Stood up with PT (+)  Denies cp, sob, dizziness, HA, abdominal pain, n/v.  Rest of ROS negative.     Vital Signs Last 24 Hrs  T(C): 36.4 (14 Jun 2019 09:40), Max: 98.2 (13 Jun 2019 14:00)  T(F): 97.6 (14 Jun 2019 09:40), Max: 99.6 (14 Jun 2019 05:32)  HR: 112 (14 Jun 2019 09:40) (112 - 134)  BP: 121/78 (14 Jun 2019 09:40) (106/66 - 124/69)  BP(mean): 98 (13 Jun 2019 16:00) (83 - 98)  RR: 17 (14 Jun 2019 09:40) (12 - 23)  SpO2: 100% (14 Jun 2019 09:40) (91% - 100%)    PHYSICAL EXAMINATION  * General: Not in acute distress. Awake and alert. Lying comfortably in bed.  * Lungs: Clear to auscultation, no rales, no wheezes.  * Cardio: Regular rate and rhythm, no murmurs, no rubs, no gallops. Good peripheral pulses.  * Abdomen: Soft, non-tender, non-distended, tympanic to percussion, no rebound, no guarding, no rigidity. Bowel sounds present. No suprapubic or CVA tenderness.  * : Lechuga  * Extremities: Acyanotic, no edema.  * Skin: Warm and dry.  * Neuro: Alert and oriented x 3. No focal deficits. Motor strength is 5/5 throughout. Sensation intact. Cranial nerves II-XII grossly intact.                           9.6    14.91 )-----------( 155      ( 14 Jun 2019 07:15 )             30.2     06-14    143  |  107  |  6<L>  ----------------------------<  121<H>  3.5   |  26  |  0.58    Ca    8.7      14 Jun 2019 07:15  Phos  2.1     06-14  Mg     1.9     06-14    MEDICATIONS  (STANDING):  albumin human  5% IVPB 250 milliLiter(s) IV Intermittent once  atoMOXetine 60 milliGRAM(s) Oral daily  BACItracin   Ointment 1 Application(s) Topical daily  BUpivacaine liposome 1.3% Injectable (no eMAR) 20 milliLiter(s) Local Injection once  ceFAZolin  Injectable. 2000 milliGRAM(s) IV Push every 8 hours  docusate sodium 100 milliGRAM(s) Oral three times a day  famotidine    Tablet 20 milliGRAM(s) Oral every 12 hours  FLUoxetine 20 milliGRAM(s) Oral daily  gentamicin   IVPB 80 milliGRAM(s) IV Intermittent every 8 hours  lidocaine   Patch 2 Patch Transdermal daily  metoprolol succinate ER 25 milliGRAM(s) Oral daily  polyethylene glycol 3350 17 Gram(s) Oral daily  senna 2 Tablet(s) Oral at bedtime    MEDICATIONS  (PRN):  acetaminophen   Tablet .. 650 milliGRAM(s) Oral every 6 hours PRN Temp greater or equal to 38C (100.4F)  aluminum hydroxide/magnesium hydroxide/simethicone Suspension 30 milliLiter(s) Oral four times a day PRN Indigestion  bisacodyl Suppository 10 milliGRAM(s) Rectal daily PRN If no bowel movement by POD#2  cyclobenzaprine 5 milliGRAM(s) Oral three times a day PRN Muscle Spasm  HYDROmorphone  Injectable 1 milliGRAM(s) IV Push every 2 hours PRN breakthrough pain  magnesium hydroxide Suspension 30 milliLiter(s) Oral every 12 hours PRN Constipation  naloxone Injectable 0.1 milliGRAM(s) IV Push every 3 minutes PRN For ANY of the following changes in patient status:  A. RR LESS THAN 10 breaths per minute, B. Oxygen saturation LESS THAN 90%, C. Sedation score of 6  ondansetron Injectable 4 milliGRAM(s) IV Push every 6 hours PRN Nausea  oxyCODONE    IR 5 milliGRAM(s) Oral every 4 hours PRN Mild Pain (1 - 3)  oxyCODONE    IR 10 milliGRAM(s) Oral every 4 hours PRN Moderate Pain (4 - 6)  zaleplon 5 milliGRAM(s) Oral at bedtime PRN Insomnia

## 2019-06-14 NOTE — PHYSICAL THERAPY INITIAL EVALUATION ADULT - CRITERIA FOR SKILLED THERAPEUTIC INTERVENTIONS
functional limitations in following categories/risk reduction/prevention/therapy frequency/impairments found/anticipated discharge recommendation/rehab potential/predicted duration of therapy intervention

## 2019-06-14 NOTE — OCCUPATIONAL THERAPY INITIAL EVALUATION ADULT - MD ORDER
Per chart, 59F s/p R LISA (2/2019) p/w back pain which started earlier this year. Pt believed it to be related to her hip and went to see her PCP. She was diagnozed with anemia of chronic diease- further workup was done in the ER. She underwent a CT spine which demonstarted lytic lesions in spine concerning for metastatci disease. She went to see Dr. Matos for spine consultation who recommended surgery for tumor debulking and posterior spinal fusion. Patient s/p PSF C6-T6.

## 2019-06-14 NOTE — PROGRESS NOTE ADULT - SUBJECTIVE AND OBJECTIVE BOX
Interval history:  Pt seen this afternoon. In bed. 4 family members by bedside.      58 yo F with hx of hysterectomy­ tubal pregnancy, hip replacement in Feb 2019­ bone cyst initially seen by me on 5/20/19 after found to have high ferritin levels on outside labs. Work up including HH, MM, flow all negative. On 6/3/19 follow up continued to complain of bilateral flank pain. Point tenderness noted in mid-thoracic area. Pt sent for CT of C/A/P- positive for spine/ rib/ iliac lesions. Tumor markers elevated for breast ca (no breast mass however). Due to CT findings pt sent to ortho spine out of concern for impending vertebral fractures. Now admitted for surgery which was performed on 6/10/19. This morning continues to have pain in back. Able to communicate adequately. Daughter by bedside.     Past medical history:  As above    Past surg history:  As above    Social history:  Smoker- approx 10 per day, - lives with  in [facility], no durgs, alcohol socially    Family history:   No fam hx of ca    Home Medications:  atomoxetine 60 mg oral capsule: 1 cap(s) orally once a day (in the morning) (09 Jun 2019 19:24)  FLUoxetine 20 mg oral tablet: 1 tab(s) orally once a day (09 Jun 2019 19:24)  metoprolol succinate 25 mg oral tablet, extended release: 1 tab(s) orally once a day (09 Jun 2019 19:23)    Allergies  No Known Allergies    Exam:  Vital Signs Last 24 Hrs  T(C): 36.1 (14 Jun 2019 17:03), Max: 37.6 (14 Jun 2019 05:32)  T(F): 97 (14 Jun 2019 17:03), Max: 99.6 (14 Jun 2019 05:32)  HR: 128 (14 Jun 2019 17:03) (112 - 129)  BP: 106/66 (14 Jun 2019 17:03) (106/66 - 130/81)  BP(mean): --  RR: 18 (14 Jun 2019 17:03) (16 - 18)  SpO2: 94% (14 Jun 2019 17:03) (94% - 100%)    AAOx3, somewhat uncomfortable, NAD  No scleral icterus  No pallor  Midline incision on back- no oozing or bleeding this AM  Clear to auscultation  Tachycardic  + pulses, equal  No edema    Labs:  WBC 14, Hgb 9.6, plt 155  Cr normal. Liver normal.     Path: Adenocarcinoma of lung origin     Imaging:  CT of C/A/P reviewed

## 2019-06-14 NOTE — PHYSICAL THERAPY INITIAL EVALUATION ADULT - GAIT DEVIATIONS NOTED, PT EVAL
decreased velocity of limb motion/decreased stride length/increased time in double stance/decreased step length/decreased jayro/decreased swing-to-stance ratio

## 2019-06-14 NOTE — DIETITIAN INITIAL EVALUATION ADULT. - NS AS NUTRI INTERV MEALS SNACK
Recommend diet consistency per team pending workup Recommend diet consistency per team pending workup. No diet restrictions at this time.

## 2019-06-14 NOTE — PROVIDER CONTACT NOTE (OTHER) - SITUATION
Pt complained of severe intermittent right sided pain. Lidocaine patch does not help to relive pain. Pt also tachycardic w/ HR as high as 130 bpm.

## 2019-06-14 NOTE — PROGRESS NOTE ADULT - SUBJECTIVE AND OBJECTIVE BOX
Neurology Follow up note    Name  YANETH QUESADA    HPI:  59F s/p R LISA (2/2019) p/w back pain which started earlier this year. Pt believed it to be related to her hip and went to see her PCP. She was diagnozed with anemia of chronic diease- further workup was done in the ER. She underwent a CT spine which demonstarted lytic lesions in spine concerning for metastatci disease. She went to see Dr. Matos for spine consultation who recommended surgery for tumor debulking and posterior spinal fusion. Currently denies numbness and tingilng. Endorses band like pain around mid-torso. No bowel or bladder incontinence. No gait imbalances. Of note, patient is a 34 year 0.5 pack smoker. (09 Jun 2019 13:07)      Interval History - back pain and radicular symptoms in the LE        REVIEW OF SYSTEMS    Vital Signs Last 24 Hrs  T(C): 37.6 (14 Jun 2019 05:32), Max: 98.2 (13 Jun 2019 14:00)  T(F): 99.6 (14 Jun 2019 05:32), Max: 99.6 (14 Jun 2019 05:32)  HR: 120 (14 Jun 2019 05:32) (114 - 134)  BP: 106/66 (14 Jun 2019 05:32) (106/66 - 125/74)  BP(mean): 98 (13 Jun 2019 16:00) (83 - 98)  RR: 17 (14 Jun 2019 05:32) (12 - 28)  SpO2: 94% (14 Jun 2019 05:32) (91% - 97%)    Physical Exam-     Mental Status- awake and alert    Cranial Nerves- full EOM    Gait and station- n/a    Motor- no foot drop    Reflexes-  decreased    Sensation- no sensory level    Coordination- no tremors    Vascular - no bruits    Medications  acetaminophen   Tablet .. 650 milliGRAM(s) Oral every 6 hours PRN  albumin human  5% IVPB 250 milliLiter(s) IV Intermittent once  aluminum hydroxide/magnesium hydroxide/simethicone Suspension 30 milliLiter(s) Oral four times a day PRN  atoMOXetine 60 milliGRAM(s) Oral daily  BACItracin   Ointment 1 Application(s) Topical daily  bisacodyl Suppository 10 milliGRAM(s) Rectal daily PRN  BUpivacaine liposome 1.3% Injectable (no eMAR) 20 milliLiter(s) Local Injection once  ceFAZolin  Injectable. 2000 milliGRAM(s) IV Push every 8 hours  cyclobenzaprine 5 milliGRAM(s) Oral three times a day PRN  docusate sodium 100 milliGRAM(s) Oral three times a day  famotidine    Tablet 20 milliGRAM(s) Oral every 12 hours  FLUoxetine 20 milliGRAM(s) Oral daily  gentamicin   IVPB 80 milliGRAM(s) IV Intermittent every 8 hours  HYDROmorphone  Injectable 1 milliGRAM(s) IV Push every 2 hours PRN  lidocaine   Patch 2 Patch Transdermal daily  magnesium hydroxide Suspension 30 milliLiter(s) Oral every 12 hours PRN  metoprolol succinate ER 25 milliGRAM(s) Oral daily  morphine  - Injectable 2 milliGRAM(s) IV Push every 4 hours PRN  naloxone Injectable 0.1 milliGRAM(s) IV Push every 3 minutes PRN  ondansetron Injectable 4 milliGRAM(s) IV Push every 6 hours PRN  oxyCODONE    IR 5 milliGRAM(s) Oral every 4 hours PRN  oxyCODONE    IR 10 milliGRAM(s) Oral every 4 hours PRN  polyethylene glycol 3350 17 Gram(s) Oral daily  senna 2 Tablet(s) Oral at bedtime  zaleplon 5 milliGRAM(s) Oral at bedtime PRN      Lab      Radiology    Assessment- Lumbar radicular symptoms    Plan as per miya

## 2019-06-14 NOTE — PROVIDER CONTACT NOTE (OTHER) - SITUATION
Pt's Lechuga output for the shift so far is 9000 ml. Pt is drinking a lot of fluids and getting lactated ringers at 100ml/hr.

## 2019-06-14 NOTE — OCCUPATIONAL THERAPY INITIAL EVALUATION ADULT - STANDING BALANCE: DYNAMIC, REHAB EVAL
poor minus/Patient completed 3 standing tranfers, w/ approximately 3 steps each transfer to go from bed to chair, chair to commode, and commode to bed. Patient w/ max assistance to complete task

## 2019-06-14 NOTE — PHYSICAL THERAPY INITIAL EVALUATION ADULT - GENERAL OBSERVATIONS, REHAB EVAL
Patient received semi-supine in no acute distress, +Telemetry, +Lechuga, +SCDs, +Hemovac x1, Heplock. Sister present. Cleared by KYLAH Cordova

## 2019-06-14 NOTE — PHYSICAL THERAPY INITIAL EVALUATION ADULT - PLANNED THERAPY INTERVENTIONS, PT EVAL
strengthening/transfer training/balance training/bed mobility training/gait training/neuromuscular re-education/postural re-education

## 2019-06-14 NOTE — DIETITIAN INITIAL EVALUATION ADULT. - ENERGY NEEDS
Ht (6/14 per pt): 170.18cm, Wt (6/12): 77.1kg, IBW: 61.4kg +/-10%, %IBW: 126%, BMI: 26.6   IBW used to calculate energy needs due to pt's current body weight exceeding 120% of IBW. Needs adjusted s/p surgery, overweight status. Ht (6/14 per pt): 170.18cm, Wt (6/12): 77.1kg, IBW: 61.4kg +/-10%, %IBW: 126%, BMI: 26.6   IBW used to calculate energy needs due to pt's current body weight exceeding 120% of IBW. Needs adjusted s/p surgery, hypermetabolic state, overweight status.

## 2019-06-14 NOTE — OCCUPATIONAL THERAPY INITIAL EVALUATION ADULT - ADDITIONAL COMMENTS
Patient reports being independent in functional mobility and ADLs PTA w/o use of AD/AE. Patient lives in home in [facility], however family staying in suite at [facility], w/ no stairs to enter in [facility].

## 2019-06-14 NOTE — OCCUPATIONAL THERAPY INITIAL EVALUATION ADULT - PLANNED THERAPY INTERVENTIONS, OT EVAL
motor coordination training/ROM/ADL retraining/balance training/bed mobility training/neuromuscular re-education/fine motor coordination training/strengthening/transfer training glasses / contacts/Normal vision: sees adequately in most situations; can see medication labels, newsprint

## 2019-06-14 NOTE — PROGRESS NOTE ADULT - ASSESSMENT
60yo F, PMH of SBO, R LISA, newly diagnosed metastatic cancer with lytic lesions presents for spine surgery in setting of lytic lesions and epidural invasion at T10. Medicine consult requested for preoperative medical clearance. Now s/p PSF C6-T6 (6/12); Partial corpectomy T10; PSF T7-12 (6/10).    1) Sinus tachycardia, likely pain related.  * Needs Incentive spirometry  * Pain control -pain management    2) HTN,  * c/w Toprol XL 25mg po daily -home med     3) Metastatic spinal disease  s/p debulking sx.  * second stage sx today    4) Post-op state  * OOB-C  * Physical therapy evaluation  * Aggressive incentive spirometry  * Pain control and bowel regimen  * Mechanical LE ppx   * Keep telemetry for now.    5) VTE ppx.   *  SCDs and ambulation as possible

## 2019-06-14 NOTE — PROGRESS NOTE ADULT - ASSESSMENT
60 yo F with unspecified malignancy.    High ferritin in outpt likely from malignancy  Spine/ rib/ iliac lesions.   S/p debulking and stabalizing surgery.  Pathology from spinal specimen consistent with adenocarcinoma of lung origin.  Discussed with pathology this morning.   Molecular testing- EGFR/ ROS/ ALK and PDL 1 studies ordered and pending.  Discussed different treatment options with pt and her family today.   Final treatment will depend on further path testing.  Pt still needs MRI brain and baseline PET/CT.   Will send for imaging when recovers from surgery a little more- prob will not be able to lay on hard board through duration of MRI.   Cont pain control.  Incentive spirometer by bedside.   Needs DVT prophylaxis- for now ok with SCDs. Not ambulating.   Would add heparinoid product as soon as all procedures complete and bleeding risk low- very high risk for VTE.   Will follow.  Thank you for all recs.    Breana Ramírez MD  [phone]

## 2019-06-14 NOTE — PROGRESS NOTE ADULT - SUBJECTIVE AND OBJECTIVE BOX
Vital Signs Last 24 Hrs  T(C): 37.6 (06-14-19 @ 14:14), Max: 37.6 (06-14-19 @ 14:14)  T(F): 99.6 (06-14-19 @ 14:14), Max: 99.6 (06-14-19 @ 14:14)  HR: 129 (06-14-19 @ 14:14) (129 - 129)  BP: 115/79 (06-14-19 @ 14:14) (115/79 - 115/79)  BP(mean): --  RR: 17 (06-14-19 @ 14:14) (17 - 17)  SpO2: 99% (06-14-19 @ 14:14) (99% - 99%)  AVSS    Patient with sinus tachycardia persisting 120s  Per Dr Eddy, patient needs improved pain control     Reviewed pain control with pain management  Start tylenol  q8 hrs standing   Advised nursing q4 hours pain assessment to encourage PO narcotics

## 2019-06-14 NOTE — PHYSICAL THERAPY INITIAL EVALUATION ADULT - ADDITIONAL COMMENTS
Patient reports being independent in functional mobility and ADLs PTA w/o use of Assistive Devices. Patient lives in home in [facility], however family staying in suite at [facility]", w/ no stairs to enter in [place]. Sister visiting to assist patient with all ADLs.

## 2019-06-14 NOTE — PROGRESS NOTE ADULT - SUBJECTIVE AND OBJECTIVE BOX
Ortho Note    Pt seen and examined at bedside   Excess urine output noted overnight >7L  Patient reports constant dry mouth, drinking water continuously  Reports appetite decreased secondary dry mouth   States symptoms started approx 2 weeks ago at home   Reports new pain at right pelvis where she was told she does have a lesion on imaging   Reports pain moderately controlled  Left sided drain out accidentally today when patient OOB   Denies CP, SOB, N/V, numbness/tingling     Vital Signs Last 24 Hrs  T(C): 36.4 (06-14-19 @ 09:40), Max: 36.4 (06-14-19 @ 09:40)  T(F): 97.6 (06-14-19 @ 09:40), Max: 97.6 (06-14-19 @ 09:40)  HR: 112 (06-14-19 @ 09:40) (112 - 112)  BP: 121/78 (06-14-19 @ 09:40) (121/78 - 121/78)  BP(mean): --  RR: 17 (06-14-19 @ 09:40) (17 - 17)  SpO2: 100% (06-14-19 @ 09:40) (100% - 100%)  AVSS    General: Pt Alert and oriented, NAD  Dressing C/D/I over posterior incisions with abd over left drain removed C/D/I   Pulses: 2+ bilateral upper and lower   Sensation: intact bilateral upper and lower extremities   Motor: EHL/FHL/TA/GS 5/5                          9.6    14.91 )-----------( 155      ( 14 Jun 2019 07:15 )             30.2   14 Jun 2019 07:15    143    |  107    |  6      ----------------------------<  121    3.5     |  26     |  0.58     Ca    8.7        14 Jun 2019 07:15  Phos  2.1       14 Jun 2019 07:15  Mg     1.9       14 Jun 2019 07:15        A/P: 60y/o Female POD# 2 s/p C6-T6 PSF, POD #4 s/p T6 T12 fusion     - Reviewed urine output with Dr Eddy- BMP reviewed- Na compensating   - Continue daily BMPs, if electrolytes outside range- likely will need to check Urine Na level   - reviewed tachycardia with Dr Eddy, stable in the post op setting   - Pain Control: continue adequate pain control as able   - DVT ppx: SCDs  - PT, WBS: wbat bilateral LE, continue mobilization with PT , OOB to chair x3 today as tolerated   - TSLO brace for support   - daily BMPs ordered to monitor lytes  - IVF d/c- patient with adequate PO intake   - reinforce drain site PRN drainage   - maintain Right sided HV   Ortho Pager [phone]

## 2019-06-14 NOTE — PROGRESS NOTE ADULT - SUBJECTIVE AND OBJECTIVE BOX
Ortho    Procedure: PSF C6-T6 (6/12); Partial corpectomy T10; PSF T7-12 (6/10)   Surgeon: Carlo    Pt pain controlled after 2nd surgery. Neck discomfort endorsed but controlled.  Denies CP, SOB, N/V, numbness/tingling     Vital Signs Last 24 Hrs  T(C): 37.6 (14 Jun 2019 05:32), Max: 98.2 (13 Jun 2019 14:00)  T(F): 99.6 (14 Jun 2019 05:32), Max: 99.6 (14 Jun 2019 05:32)  HR: 120 (14 Jun 2019 05:32) (114 - 134)  BP: 106/66 (14 Jun 2019 05:32) (106/66 - 125/74)  BP(mean): 98 (13 Jun 2019 16:00) (83 - 98)  RR: 17 (14 Jun 2019 05:32) (12 - 28)  SpO2: 94% (14 Jun 2019 05:32) (91% - 97%)    General: Pt Alert and oriented, NAD  Neck and back DSG C/D/I, dsg changed, incision c/d/i   	paracervical tenderness L > R   	L D/B 4+, T/WE/WF/ stregth 5  	R D/B/T/WE/WF/  strength 5   	SILT C5-T1 bilat   	2+ rad bilat                            8.7    14.68 )-----------( 181      ( 12 Jun 2019 19:39 )             28.2   12 Jun 2019 19:39    149    |  112    |  9      ----------------------------<  172    4.3     |  29     |  0.73     Ca    8.5        12 Jun 2019 19:39    Drain output:    06-11-19 @ 07:01  -  06-12-19 @ 07:00  --------------------------------------------------------  OUT:    Drain: 210 mL    Drain: 260 mL  Total OUT: 470 mL      06-12-19 @ 07:01  -  06-12-19 @ 23:36  --------------------------------------------------------  OUT:  Total OUT: 0 mL      A/P: 59yFemale s/p PSF C6-T6 (6/12); Partial corpectomy T10; PSF T7-12 (6/10)   - Stable  - Pain Control  - strict BP control  - DVT ppx: SCDs  - Post op abx: ancef periop; gent until drains out   - PT, WBS: WBAT  - dispo: PT eval     Ortho Pager [phone]

## 2019-06-14 NOTE — OCCUPATIONAL THERAPY INITIAL EVALUATION ADULT - GENERAL OBSERVATIONS, REHAB EVAL
R hand dominant, patient cleared for OT by KYLE Cordova. Patient received semi-supine, c/o pain 5/10 in neck/posterior shoulders, +urena, +heplock, +SCDs, +hemovac, +sister at bedside +TLSO for OOB activities

## 2019-06-14 NOTE — OCCUPATIONAL THERAPY INITIAL EVALUATION ADULT - GROSSLY INTACT, SENSORY
Left UE/Right UE/Right LE/Left LE/Grossly Intact/Patient reports no changes to sensation, denies numbness or tingling

## 2019-06-14 NOTE — DIETITIAN INITIAL EVALUATION ADULT. - OTHER INFO
58 yo F, PMH of SBO, R LISA, newly diagnosed metastatic cancer with lytic lesions now s/p PSF C6-T6 (6/12); Partial corpectomy T10; PSF T7-12 (6/10). Pt reports only able to tolerate liquids and soft foods (like yogurt) for the past 3 weeks 2/2 very dry mouth. Pt denies food allergies. Pt endorses 15lb wt loss in the last 2 months but unable to state UBW and current weight. Unclear if reported wt loss accurate, no significant findings on NFPE. GI: WDL, last BM 6/10, pt denies N/V. Skin: Bin score 18, wound b/l cheeks. Pain controlled at this time. Plan for workup of why pt only able to tolerate fluids per PA. Recommend replete lytes prn. Please see full nutritional recs below. RD to monitor and f/u per high risk protocol.

## 2019-06-15 LAB
ANION GAP SERPL CALC-SCNC: 10 MMOL/L — SIGNIFICANT CHANGE UP (ref 5–17)
BUN SERPL-MCNC: 4 MG/DL — LOW (ref 7–23)
CALCIUM SERPL-MCNC: 8.8 MG/DL — SIGNIFICANT CHANGE UP (ref 8.4–10.5)
CHLORIDE SERPL-SCNC: 106 MMOL/L — SIGNIFICANT CHANGE UP (ref 96–108)
CO2 SERPL-SCNC: 29 MMOL/L — SIGNIFICANT CHANGE UP (ref 22–31)
CREAT SERPL-MCNC: 0.57 MG/DL — SIGNIFICANT CHANGE UP (ref 0.5–1.3)
GLUCOSE SERPL-MCNC: 124 MG/DL — HIGH (ref 70–99)
HCT VFR BLD CALC: 29.3 % — LOW (ref 34.5–45)
HGB BLD-MCNC: 9.4 G/DL — LOW (ref 11.5–15.5)
MCHC RBC-ENTMCNC: 30.5 PG — SIGNIFICANT CHANGE UP (ref 27–34)
MCHC RBC-ENTMCNC: 32.1 GM/DL — SIGNIFICANT CHANGE UP (ref 32–36)
MCV RBC AUTO: 95.1 FL — SIGNIFICANT CHANGE UP (ref 80–100)
NRBC # BLD: 0 /100 WBCS — SIGNIFICANT CHANGE UP (ref 0–0)
PLATELET # BLD AUTO: 166 K/UL — SIGNIFICANT CHANGE UP (ref 150–400)
POTASSIUM SERPL-MCNC: 3.5 MMOL/L — SIGNIFICANT CHANGE UP (ref 3.5–5.3)
POTASSIUM SERPL-SCNC: 3.5 MMOL/L — SIGNIFICANT CHANGE UP (ref 3.5–5.3)
RBC # BLD: 3.08 M/UL — LOW (ref 3.8–5.2)
RBC # FLD: 16.6 % — HIGH (ref 10.3–14.5)
SODIUM SERPL-SCNC: 145 MMOL/L — SIGNIFICANT CHANGE UP (ref 135–145)
WBC # BLD: 13.76 K/UL — HIGH (ref 3.8–10.5)
WBC # FLD AUTO: 13.76 K/UL — HIGH (ref 3.8–10.5)

## 2019-06-15 PROCEDURE — 99233 SBSQ HOSP IP/OBS HIGH 50: CPT | Mod: GC

## 2019-06-15 RX ORDER — DIPHENHYDRAMINE HYDROCHLORIDE AND LIDOCAINE HYDROCHLORIDE AND ALUMINUM HYDROXIDE AND MAGNESIUM HYDRO
10 KIT EVERY 6 HOURS
Refills: 0 | Status: DISCONTINUED | OUTPATIENT
Start: 2019-06-15 | End: 2019-06-21

## 2019-06-15 RX ADMIN — SENNA PLUS 2 TABLET(S): 8.6 TABLET ORAL at 22:05

## 2019-06-15 RX ADMIN — Medication 975 MILLIGRAM(S): at 06:04

## 2019-06-15 RX ADMIN — Medication 975 MILLIGRAM(S): at 23:50

## 2019-06-15 RX ADMIN — OXYCODONE HYDROCHLORIDE 10 MILLIGRAM(S): 5 TABLET ORAL at 18:39

## 2019-06-15 RX ADMIN — Medication 204 MILLIGRAM(S): at 01:14

## 2019-06-15 RX ADMIN — Medication 2000 MILLIGRAM(S): at 22:06

## 2019-06-15 RX ADMIN — Medication 100 MILLIGRAM(S): at 22:09

## 2019-06-15 RX ADMIN — Medication 25 MILLIGRAM(S): at 05:04

## 2019-06-15 RX ADMIN — HEPARIN SODIUM 5000 UNIT(S): 5000 INJECTION INTRAVENOUS; SUBCUTANEOUS at 05:03

## 2019-06-15 RX ADMIN — DIPHENHYDRAMINE HYDROCHLORIDE AND LIDOCAINE HYDROCHLORIDE AND ALUMINUM HYDROXIDE AND MAGNESIUM HYDRO 10 MILLILITER(S): KIT at 18:22

## 2019-06-15 RX ADMIN — HEPARIN SODIUM 5000 UNIT(S): 5000 INJECTION INTRAVENOUS; SUBCUTANEOUS at 18:23

## 2019-06-15 RX ADMIN — Medication 204 MILLIGRAM(S): at 11:27

## 2019-06-15 RX ADMIN — Medication 20 MILLIGRAM(S): at 11:36

## 2019-06-15 RX ADMIN — Medication 975 MILLIGRAM(S): at 14:06

## 2019-06-15 RX ADMIN — Medication 975 MILLIGRAM(S): at 13:08

## 2019-06-15 RX ADMIN — OXYCODONE HYDROCHLORIDE 10 MILLIGRAM(S): 5 TABLET ORAL at 13:09

## 2019-06-15 RX ADMIN — OXYCODONE HYDROCHLORIDE 10 MILLIGRAM(S): 5 TABLET ORAL at 10:07

## 2019-06-15 RX ADMIN — Medication 975 MILLIGRAM(S): at 05:04

## 2019-06-15 RX ADMIN — Medication 100 MILLIGRAM(S): at 13:08

## 2019-06-15 RX ADMIN — OXYCODONE HYDROCHLORIDE 10 MILLIGRAM(S): 5 TABLET ORAL at 05:57

## 2019-06-15 RX ADMIN — OXYCODONE HYDROCHLORIDE 10 MILLIGRAM(S): 5 TABLET ORAL at 09:04

## 2019-06-15 RX ADMIN — Medication 204 MILLIGRAM(S): at 18:22

## 2019-06-15 RX ADMIN — OXYCODONE HYDROCHLORIDE 10 MILLIGRAM(S): 5 TABLET ORAL at 04:57

## 2019-06-15 RX ADMIN — ATOMOXETINE HYDROCHLORIDE 60 MILLIGRAM(S): 10 CAPSULE ORAL at 14:02

## 2019-06-15 RX ADMIN — POLYETHYLENE GLYCOL 3350 17 GRAM(S): 17 POWDER, FOR SOLUTION ORAL at 11:32

## 2019-06-15 RX ADMIN — OXYCODONE HYDROCHLORIDE 10 MILLIGRAM(S): 5 TABLET ORAL at 14:06

## 2019-06-15 RX ADMIN — HYDROMORPHONE HYDROCHLORIDE 1 MILLIGRAM(S): 2 INJECTION INTRAMUSCULAR; INTRAVENOUS; SUBCUTANEOUS at 01:20

## 2019-06-15 RX ADMIN — HYDROMORPHONE HYDROCHLORIDE 1 MILLIGRAM(S): 2 INJECTION INTRAMUSCULAR; INTRAVENOUS; SUBCUTANEOUS at 00:46

## 2019-06-15 RX ADMIN — Medication 2000 MILLIGRAM(S): at 13:07

## 2019-06-15 RX ADMIN — FAMOTIDINE 20 MILLIGRAM(S): 10 INJECTION INTRAVENOUS at 09:05

## 2019-06-15 RX ADMIN — DIPHENHYDRAMINE HYDROCHLORIDE AND LIDOCAINE HYDROCHLORIDE AND ALUMINUM HYDROXIDE AND MAGNESIUM HYDRO 10 MILLILITER(S): KIT at 23:37

## 2019-06-15 RX ADMIN — Medication 2000 MILLIGRAM(S): at 05:03

## 2019-06-15 RX ADMIN — FAMOTIDINE 20 MILLIGRAM(S): 10 INJECTION INTRAVENOUS at 22:06

## 2019-06-15 RX ADMIN — OXYCODONE HYDROCHLORIDE 10 MILLIGRAM(S): 5 TABLET ORAL at 23:50

## 2019-06-15 RX ADMIN — Medication 975 MILLIGRAM(S): at 22:05

## 2019-06-15 RX ADMIN — OXYCODONE HYDROCHLORIDE 10 MILLIGRAM(S): 5 TABLET ORAL at 22:51

## 2019-06-15 RX ADMIN — Medication 1 APPLICATION(S): at 11:37

## 2019-06-15 RX ADMIN — OXYCODONE HYDROCHLORIDE 10 MILLIGRAM(S): 5 TABLET ORAL at 19:18

## 2019-06-15 NOTE — PROGRESS NOTE ADULT - SUBJECTIVE AND OBJECTIVE BOX
Ortho    Procedure: PSF C6-T6 (6/12); Partial corpectomy T10; PSF T7-12 (6/10)   Surgeon: Carlo    Pt pain controlled after 2nd surgery. Progressing with PT.   Denies CP, SOB, N/V, numbness/tingling     Vital Signs Last 24 Hrs  T(C): 36.1 (15 Fareed 2019 05:00), Max: 37.6 (14 Jun 2019 14:14)  T(F): 96.9 (15 Fareed 2019 05:00), Max: 99.6 (14 Jun 2019 14:14)  HR: 110 (15 Fareed 2019 05:00) (108 - 129)  BP: 143/79 (15 Fareed 2019 05:00) (99/62 - 143/79)  BP(mean): --  RR: 17 (15 Fareed 2019 05:00) (16 - 18)  SpO2: 94% (15 Fareed 2019 05:00) (94% - 100%)    General: Pt Alert and oriented, NAD  Neck and back DSG C/D/I, dsg c/d/i   	paracervical tenderness L > R   	L D/B 4+, T/WE/WF/ strength 5  	R D/B/T/WE/WF/  strength 5   	SILT C5-T1 bilat   	2+ rad bilat                 A/P: 59yFemale s/p PSF C6-T6 (6/12); Partial corpectomy T10; PSF T7-12 (6/10)   - Stable  - Pain Control  - strict BP control  - DVT ppx: SCDs  - Post op abx: ancef periop; gent until drains out   - PT, WBS: WBAT  - dispo: Progressing with PT     Ortho Pager [phone]

## 2019-06-15 NOTE — PROGRESS NOTE ADULT - ASSESSMENT
58 yo F with unspecified malignancy.    High ferritin in outpt likely from malignancy  Spine/ rib/ iliac lesions.   S/p debulking and stabalizing surgery.  Pathology from spinal specimen consistent with adenocarcinoma of lung origin.  Discussed with pathology this morning.   Molecular testing- EGFR/ ROS/ ALK and PDL 1 studies ordered and pending.  Discussed different treatment options with pt and her family today.   Final treatment will depend on further path testing.  Pt still needs MRI brain and baseline PET/CT.   Will send for imaging when recovers from surgery a little more- prob will not be able to lay on hard board through duration of MRI.   Cont pain control.  Incentive spirometer by bedside.   Needs DVT prophylaxis- started on Heparin sq q12 hours for now. Increase to tid throughout weekend.    Will follow.  Thank you for all recs.    Breana Ramírez MD  [phone]

## 2019-06-15 NOTE — PROGRESS NOTE ADULT - SUBJECTIVE AND OBJECTIVE BOX
INTERVAL EVENTS:  -- NAEO  -- started on standing Tylenol yesterday for pain control and tachycardia   -- HR's downtrending overnight  -- worked with PT and able to complete session    SUBJECTIVE:  -- states pain is better controlled and mostly exacerbated with exertion  -- denies fevers, chills, CP, SOB, abdominal pain   -- tolerating PO intake, +flatus    MEDICATIONS:  MEDICATIONS  (STANDING):  acetaminophen   Tablet .. 975 milliGRAM(s) Oral every 8 hours  albumin human  5% IVPB 250 milliLiter(s) IV Intermittent once  atoMOXetine 60 milliGRAM(s) Oral daily  BACItracin   Ointment 1 Application(s) Topical daily  BUpivacaine liposome 1.3% Injectable (no eMAR) 20 milliLiter(s) Local Injection once  ceFAZolin  Injectable. 2000 milliGRAM(s) IV Push every 8 hours  docusate sodium 100 milliGRAM(s) Oral three times a day  famotidine    Tablet 20 milliGRAM(s) Oral every 12 hours  FIRST- Mouthwash  BLM 10 milliLiter(s) Swish and Spit every 6 hours  FLUoxetine 20 milliGRAM(s) Oral daily  gentamicin   IVPB 80 milliGRAM(s) IV Intermittent every 8 hours  heparin  Injectable 5000 Unit(s) SubCutaneous every 12 hours  lidocaine   Patch 2 Patch Transdermal daily  metoprolol succinate ER 25 milliGRAM(s) Oral daily  polyethylene glycol 3350 17 Gram(s) Oral daily  senna 2 Tablet(s) Oral at bedtime    MEDICATIONS  (PRN):  acetaminophen   Tablet .. 650 milliGRAM(s) Oral every 6 hours PRN Temp greater or equal to 38C (100.4F)  aluminum hydroxide/magnesium hydroxide/simethicone Suspension 30 milliLiter(s) Oral four times a day PRN Indigestion  bisacodyl Suppository 10 milliGRAM(s) Rectal daily PRN If no bowel movement by POD#2  cyclobenzaprine 5 milliGRAM(s) Oral three times a day PRN Muscle Spasm  HYDROmorphone  Injectable 1 milliGRAM(s) IV Push every 2 hours PRN breakthrough pain  magnesium hydroxide Suspension 30 milliLiter(s) Oral every 12 hours PRN Constipation  naloxone Injectable 0.1 milliGRAM(s) IV Push every 3 minutes PRN For ANY of the following changes in patient status:  A. RR LESS THAN 10 breaths per minute, B. Oxygen saturation LESS THAN 90%, C. Sedation score of 6  ondansetron Injectable 4 milliGRAM(s) IV Push every 6 hours PRN Nausea  oxyCODONE    IR 5 milliGRAM(s) Oral every 4 hours PRN Mild Pain (1 - 3)  oxyCODONE    IR 10 milliGRAM(s) Oral every 4 hours PRN Moderate Pain (4 - 6)  zaleplon 5 milliGRAM(s) Oral at bedtime PRN Insomnia    Allergies: No Known Allergies    OBJECTIVE:  Vital Signs Last 24 Hrs  T(C): 36.4 (15 Fareed 2019 13:57), Max: 37.6 (14 Jun 2019 14:14)  T(F): 97.5 (15 Fareed 2019 13:57), Max: 99.6 (14 Jun 2019 14:14)  HR: 108 (15 Fareed 2019 13:57) (105 - 129)  BP: 103/66 (15 Fareed 2019 13:57) (99/62 - 143/79)  BP(mean): --  RR: 17 (15 Fareed 2019 13:57) (16 - 18)  SpO2: 92% (15 Fareed 2019 13:57) (92% - 99%)  I&O's Summary    14 Jun 2019 07:01  -  15 Fareed 2019 07:00  --------------------------------------------------------  IN: 2310 mL / OUT: 08856 mL / NET: -03149 mL    15 Fareed 2019 07:01  -  15 Fareed 2019 14:09  --------------------------------------------------------  IN: 0 mL / OUT: 5600 mL / NET: -5600 mL    PHYSICAL EXAM:  Gen: NAD, sitting upright in bedside chair  HEENT: NCAT, MMM, clear OP  CV: RRR, no m/g/r appreciated  Pulm: CTA B, no w/r/r; no increase in WOB  Abd: normoactive BS, soft, NTND  : urena w/clear, yellow urine  Ext: WWP, 2+ pulses x4; no c/e/e  Neuro: AOx3, +TLSO brace, BLE 5/5  Psych: pleasant, conversant and appropriate    LABS:                        9.4    13.76 )-----------( 166      ( 15 Fareed 2019 06:38 )             29.3     06-15    145  |  106  |  4<L>  ----------------------------<  124<H>  3.5   |  29  |  0.57    Ca    8.8      15 Fareed 2019 06:38  Phos  2.1     06-14  Mg     1.9     06-14    MICRODATA:  -- No new data.    RADIOLOGY/OTHER STUDIES:  -- No new imaging.

## 2019-06-15 NOTE — PROGRESS NOTE ADULT - SUBJECTIVE AND OBJECTIVE BOX
Neurology Follow up note    Name  YANETH QUESADA    HPI:  59F s/p R LISA (2/2019) p/w back pain which started earlier this year. Pt believed it to be related to her hip and went to see her PCP. She was diagnozed with anemia of chronic diease- further workup was done in the ER. She underwent a CT spine which demonstarted lytic lesions in spine concerning for metastatci disease. She went to see Dr. Matos for spine consultation who recommended surgery for tumor debulking and posterior spinal fusion. Currently denies numbness and tingilng. Endorses band like pain around mid-torso. No bowel or bladder incontinence. No gait imbalances. Of note, patient is a 34 year 0.5 pack smoker. (09 Jun 2019 13:07)      Interval History - back pain and radicular symptoms in the LE        REVIEW OF SYSTEMS    Vital Signs Last 24 Hrs  T(C): 36.6 (15 Fareed 2019 09:42), Max: 37.6 (14 Jun 2019 14:14)  T(F): 97.8 (15 Fareed 2019 09:42), Max: 99.6 (14 Jun 2019 14:14)  HR: 105 (15 Fareed 2019 09:42) (105 - 129)  BP: 115/68 (15 Fareed 2019 09:42) (99/62 - 143/79)  BP(mean): --  RR: 17 (15 Fareed 2019 09:42) (16 - 18)  SpO2: 93% (15 Fareed 2019 09:42) (93% - 99%)    Physical Exam-     Mental Status- awake and alert    Cranial Nerves- full EOM    Gait and station- unsteady    Motor- moves all 4 extremities    Reflexes- decreased    Sensation- no sensory level    Coordination- no tremors    Vascular - no bruits    Medications  acetaminophen   Tablet .. 650 milliGRAM(s) Oral every 6 hours PRN  acetaminophen   Tablet .. 975 milliGRAM(s) Oral every 8 hours  albumin human  5% IVPB 250 milliLiter(s) IV Intermittent once  aluminum hydroxide/magnesium hydroxide/simethicone Suspension 30 milliLiter(s) Oral four times a day PRN  atoMOXetine 60 milliGRAM(s) Oral daily  BACItracin   Ointment 1 Application(s) Topical daily  bisacodyl Suppository 10 milliGRAM(s) Rectal daily PRN  BUpivacaine liposome 1.3% Injectable (no eMAR) 20 milliLiter(s) Local Injection once  ceFAZolin  Injectable. 2000 milliGRAM(s) IV Push every 8 hours  cyclobenzaprine 5 milliGRAM(s) Oral three times a day PRN  docusate sodium 100 milliGRAM(s) Oral three times a day  famotidine    Tablet 20 milliGRAM(s) Oral every 12 hours  FIRST- Mouthwash  BLM 10 milliLiter(s) Swish and Spit every 6 hours  FLUoxetine 20 milliGRAM(s) Oral daily  gentamicin   IVPB 80 milliGRAM(s) IV Intermittent every 8 hours  heparin  Injectable 5000 Unit(s) SubCutaneous every 12 hours  HYDROmorphone  Injectable 1 milliGRAM(s) IV Push every 2 hours PRN  lidocaine   Patch 2 Patch Transdermal daily  magnesium hydroxide Suspension 30 milliLiter(s) Oral every 12 hours PRN  metoprolol succinate ER 25 milliGRAM(s) Oral daily  naloxone Injectable 0.1 milliGRAM(s) IV Push every 3 minutes PRN  ondansetron Injectable 4 milliGRAM(s) IV Push every 6 hours PRN  oxyCODONE    IR 5 milliGRAM(s) Oral every 4 hours PRN  oxyCODONE    IR 10 milliGRAM(s) Oral every 4 hours PRN  polyethylene glycol 3350 17 Gram(s) Oral daily  senna 2 Tablet(s) Oral at bedtime  zaleplon 5 milliGRAM(s) Oral at bedtime PRN      Lab      Radiology    Assessment- Lumbar radiculopathy    Plan as per ortho

## 2019-06-15 NOTE — PROGRESS NOTE ADULT - ASSESSMENT
In summary, this is a 60yo woman with a PMH of SBO, R LISA and newly diagnosed, unspecified metastatic cancer with lytic bone lesions who presented for spine surgery in the setting of lytic lesions and epidural invasion at T10.  Now s/p PSF C6-T6 (6/12), partial corpectomy at T10 and  PSF at T7-12 (6/10).  Post-op course c/b pain and tachycardia.     #Sinus tachycardia, likely pain related as improving with pain control  -- Needs Incentive spirometry  -- c/w current pain regiment today; no changes  -- encourage PO intake     #HTN  -- c/w Toprol XL 25mg PO daily (home med)     #Metastatic spinal disease  -- s/p debulking sx.  -- Oncology following, appreciate assistance  -- would discuss changing SQH from BID to TID w/attending as per Onc's recs    #Post-op state  -- OOB-C  -- Physical therapy evaluation  -- Aggressive incentive spirometry  -- Pain control and bowel regimen  -- Mechanical LE ppx   -- Keep telemetry for now    # VTE ppx.   -- SQH as per above; currently on q12h    Denise Ndiaye MD  Hospitalist Attending  [phone]

## 2019-06-16 LAB
ANION GAP SERPL CALC-SCNC: 11 MMOL/L — SIGNIFICANT CHANGE UP (ref 5–17)
BUN SERPL-MCNC: 6 MG/DL — LOW (ref 7–23)
CALCIUM SERPL-MCNC: 9 MG/DL — SIGNIFICANT CHANGE UP (ref 8.4–10.5)
CHLORIDE SERPL-SCNC: 108 MMOL/L — SIGNIFICANT CHANGE UP (ref 96–108)
CO2 SERPL-SCNC: 27 MMOL/L — SIGNIFICANT CHANGE UP (ref 22–31)
CREAT SERPL-MCNC: 0.61 MG/DL — SIGNIFICANT CHANGE UP (ref 0.5–1.3)
GLUCOSE SERPL-MCNC: 130 MG/DL — HIGH (ref 70–99)
HCT VFR BLD CALC: 29.8 % — LOW (ref 34.5–45)
HGB BLD-MCNC: 9.3 G/DL — LOW (ref 11.5–15.5)
MCHC RBC-ENTMCNC: 29.6 PG — SIGNIFICANT CHANGE UP (ref 27–34)
MCHC RBC-ENTMCNC: 31.2 GM/DL — LOW (ref 32–36)
MCV RBC AUTO: 94.9 FL — SIGNIFICANT CHANGE UP (ref 80–100)
NRBC # BLD: 0 /100 WBCS — SIGNIFICANT CHANGE UP (ref 0–0)
PLATELET # BLD AUTO: 207 K/UL — SIGNIFICANT CHANGE UP (ref 150–400)
POTASSIUM SERPL-MCNC: 3.8 MMOL/L — SIGNIFICANT CHANGE UP (ref 3.5–5.3)
POTASSIUM SERPL-SCNC: 3.8 MMOL/L — SIGNIFICANT CHANGE UP (ref 3.5–5.3)
RBC # BLD: 3.14 M/UL — LOW (ref 3.8–5.2)
RBC # FLD: 17 % — HIGH (ref 10.3–14.5)
SODIUM SERPL-SCNC: 146 MMOL/L — HIGH (ref 135–145)
WBC # BLD: 12.18 K/UL — HIGH (ref 3.8–10.5)
WBC # FLD AUTO: 12.18 K/UL — HIGH (ref 3.8–10.5)

## 2019-06-16 PROCEDURE — 99233 SBSQ HOSP IP/OBS HIGH 50: CPT

## 2019-06-16 PROCEDURE — 93010 ELECTROCARDIOGRAM REPORT: CPT

## 2019-06-16 RX ORDER — HEPARIN SODIUM 5000 [USP'U]/ML
5000 INJECTION INTRAVENOUS; SUBCUTANEOUS EVERY 8 HOURS
Refills: 0 | Status: DISCONTINUED | OUTPATIENT
Start: 2019-06-16 | End: 2019-06-21

## 2019-06-16 RX ADMIN — Medication 975 MILLIGRAM(S): at 23:00

## 2019-06-16 RX ADMIN — DIPHENHYDRAMINE HYDROCHLORIDE AND LIDOCAINE HYDROCHLORIDE AND ALUMINUM HYDROXIDE AND MAGNESIUM HYDRO 10 MILLILITER(S): KIT at 17:15

## 2019-06-16 RX ADMIN — Medication 1 APPLICATION(S): at 13:03

## 2019-06-16 RX ADMIN — Medication 975 MILLIGRAM(S): at 06:23

## 2019-06-16 RX ADMIN — OXYCODONE HYDROCHLORIDE 10 MILLIGRAM(S): 5 TABLET ORAL at 08:57

## 2019-06-16 RX ADMIN — Medication 100 MILLIGRAM(S): at 22:15

## 2019-06-16 RX ADMIN — OXYCODONE HYDROCHLORIDE 10 MILLIGRAM(S): 5 TABLET ORAL at 05:16

## 2019-06-16 RX ADMIN — Medication 2000 MILLIGRAM(S): at 06:22

## 2019-06-16 RX ADMIN — DIPHENHYDRAMINE HYDROCHLORIDE AND LIDOCAINE HYDROCHLORIDE AND ALUMINUM HYDROXIDE AND MAGNESIUM HYDRO 10 MILLILITER(S): KIT at 06:27

## 2019-06-16 RX ADMIN — Medication 975 MILLIGRAM(S): at 07:30

## 2019-06-16 RX ADMIN — Medication 25 MILLIGRAM(S): at 06:22

## 2019-06-16 RX ADMIN — OXYCODONE HYDROCHLORIDE 10 MILLIGRAM(S): 5 TABLET ORAL at 09:49

## 2019-06-16 RX ADMIN — OXYCODONE HYDROCHLORIDE 10 MILLIGRAM(S): 5 TABLET ORAL at 13:02

## 2019-06-16 RX ADMIN — OXYCODONE HYDROCHLORIDE 10 MILLIGRAM(S): 5 TABLET ORAL at 20:52

## 2019-06-16 RX ADMIN — Medication 204 MILLIGRAM(S): at 09:01

## 2019-06-16 RX ADMIN — Medication 20 MILLIGRAM(S): at 13:03

## 2019-06-16 RX ADMIN — FAMOTIDINE 20 MILLIGRAM(S): 10 INJECTION INTRAVENOUS at 09:02

## 2019-06-16 RX ADMIN — OXYCODONE HYDROCHLORIDE 10 MILLIGRAM(S): 5 TABLET ORAL at 06:28

## 2019-06-16 RX ADMIN — HEPARIN SODIUM 5000 UNIT(S): 5000 INJECTION INTRAVENOUS; SUBCUTANEOUS at 13:17

## 2019-06-16 RX ADMIN — Medication 975 MILLIGRAM(S): at 13:01

## 2019-06-16 RX ADMIN — Medication 204 MILLIGRAM(S): at 17:15

## 2019-06-16 RX ADMIN — ATOMOXETINE HYDROCHLORIDE 60 MILLIGRAM(S): 10 CAPSULE ORAL at 13:03

## 2019-06-16 RX ADMIN — Medication 100 MILLIGRAM(S): at 13:05

## 2019-06-16 RX ADMIN — OXYCODONE HYDROCHLORIDE 10 MILLIGRAM(S): 5 TABLET ORAL at 19:35

## 2019-06-16 RX ADMIN — Medication 975 MILLIGRAM(S): at 14:52

## 2019-06-16 RX ADMIN — OXYCODONE HYDROCHLORIDE 10 MILLIGRAM(S): 5 TABLET ORAL at 23:54

## 2019-06-16 RX ADMIN — Medication 204 MILLIGRAM(S): at 01:17

## 2019-06-16 RX ADMIN — Medication 100 MILLIGRAM(S): at 06:25

## 2019-06-16 RX ADMIN — OXYCODONE HYDROCHLORIDE 10 MILLIGRAM(S): 5 TABLET ORAL at 14:32

## 2019-06-16 RX ADMIN — HEPARIN SODIUM 5000 UNIT(S): 5000 INJECTION INTRAVENOUS; SUBCUTANEOUS at 22:15

## 2019-06-16 RX ADMIN — SENNA PLUS 2 TABLET(S): 8.6 TABLET ORAL at 22:16

## 2019-06-16 RX ADMIN — HEPARIN SODIUM 5000 UNIT(S): 5000 INJECTION INTRAVENOUS; SUBCUTANEOUS at 06:23

## 2019-06-16 RX ADMIN — DIPHENHYDRAMINE HYDROCHLORIDE AND LIDOCAINE HYDROCHLORIDE AND ALUMINUM HYDROXIDE AND MAGNESIUM HYDRO 10 MILLILITER(S): KIT at 13:03

## 2019-06-16 RX ADMIN — Medication 975 MILLIGRAM(S): at 22:16

## 2019-06-16 RX ADMIN — FAMOTIDINE 20 MILLIGRAM(S): 10 INJECTION INTRAVENOUS at 22:15

## 2019-06-16 RX ADMIN — Medication 2000 MILLIGRAM(S): at 13:17

## 2019-06-16 NOTE — PROGRESS NOTE ADULT - SUBJECTIVE AND OBJECTIVE BOX
Ortho    Procedure: PSF C6-T6 (6/12); Partial corpectomy T10; PSF T7-12 (6/10)   Surgeon: Carlo    Pain controlled this am. Working with PT.   Denies CP, SOB, N/V, numbness/tingling     Vital Signs Last 24 Hrs  T(C): 37 (16 Jun 2019 05:19), Max: 37.1 (15 Fareed 2019 20:59)  T(F): 98.6 (16 Jun 2019 05:19), Max: 98.7 (15 Fareed 2019 20:59)  HR: 115 (16 Jun 2019 05:19) (105 - 120)  BP: 113/68 (16 Jun 2019 05:19) (93/51 - 115/68)  BP(mean): 71 (16 Jun 2019 01:17) (71 - 71)  RR: 17 (16 Jun 2019 05:19) (16 - 17)  SpO2: 93% (16 Jun 2019 05:19) (91% - 96%)    General: Pt Alert and oriented, NAD  Neck and back DSG C/D/I, dsg c/d/i   	paracervical tenderness L > R   	L D/B 4+, T/WE/WF/ strength 5  	R D/B/T/WE/WF/  strength 5   	SILT C5-T1 bilat   	2+ rad bilat                 A/P: 59yFemale s/p PSF C6-T6 (6/12); Partial corpectomy T10; PSF T7-12 (6/10)   - Stable  - Pain Control  - strict BP control  - DVT ppx: SCDs  - Post op abx: ancef periop; gent until drains out   - PT, WBS: WBAT  - dispo: Progressing with PT     Ortho Pager [phone]

## 2019-06-16 NOTE — PROGRESS NOTE ADULT - ASSESSMENT
In summary, this is a 60yo woman with a PMH of SBO, R LISA and newly diagnosed, unspecified metastatic cancer with lytic bone lesions who presented for spine surgery in the setting of lytic lesions and epidural invasion at T10.  Now s/p PSF C6-T6 (6/12), partial corpectomy at T10 and  PSF at T7-12 (6/10).  Post-op course c/b pain and tachycardia.     #Sinus tachycardia, likely pain related as improving with pain control  -- Needs Incentive spirometry  -- c/w current pain regimen, would titrate as needed  -- encourage PO intake   -- re-check EKG     #HTN  -- c/w Toprol XL 25mg PO daily (home med)     #Metastatic spinal disease  -- s/p debulking sx.  -- Oncology following, appreciate assistance    #Post-op state  -- OOB-C  -- Physical therapy evaluation  -- Aggressive incentive spirometry  -- Pain control and bowel regimen  -- Mechanical LE ppx   -- Keep telemetry for now    # VTE ppx.   -- SQH TID    Denise Ndiaye MD  Hospitalist Attending  [phone]

## 2019-06-16 NOTE — PROGRESS NOTE ADULT - SUBJECTIVE AND OBJECTIVE BOX
INTERVAL EVENTS:  -- NAEO  -- -120, mostly normotensive  -- transitioned to TID SQH this AM    SUBJECTIVE:  -- notes some palpitations associated when her pain is the worse, but also reports having tachycardia prior to her surgery   -- no lightheaded, SOB associated with tachycardia  -- very thirsty and drinking copious amounts of water    MEDICATIONS:  MEDICATIONS  (STANDING):  acetaminophen   Tablet .. 975 milliGRAM(s) Oral every 8 hours  albumin human  5% IVPB 250 milliLiter(s) IV Intermittent once  atoMOXetine 60 milliGRAM(s) Oral daily  BACItracin   Ointment 1 Application(s) Topical daily  BUpivacaine liposome 1.3% Injectable (no eMAR) 20 milliLiter(s) Local Injection once  ceFAZolin  Injectable. 2000 milliGRAM(s) IV Push every 8 hours  docusate sodium 100 milliGRAM(s) Oral three times a day  famotidine    Tablet 20 milliGRAM(s) Oral every 12 hours  FIRST- Mouthwash  BLM 10 milliLiter(s) Swish and Spit every 6 hours  FLUoxetine 20 milliGRAM(s) Oral daily  gentamicin   IVPB 80 milliGRAM(s) IV Intermittent every 8 hours  heparin  Injectable 5000 Unit(s) SubCutaneous every 8 hours  lidocaine   Patch 2 Patch Transdermal daily  metoprolol succinate ER 25 milliGRAM(s) Oral daily  polyethylene glycol 3350 17 Gram(s) Oral daily  senna 2 Tablet(s) Oral at bedtime    MEDICATIONS  (PRN):  acetaminophen   Tablet .. 650 milliGRAM(s) Oral every 6 hours PRN Temp greater or equal to 38C (100.4F)  aluminum hydroxide/magnesium hydroxide/simethicone Suspension 30 milliLiter(s) Oral four times a day PRN Indigestion  bisacodyl Suppository 10 milliGRAM(s) Rectal daily PRN If no bowel movement by POD#2  cyclobenzaprine 5 milliGRAM(s) Oral three times a day PRN Muscle Spasm  HYDROmorphone  Injectable 1 milliGRAM(s) IV Push every 2 hours PRN breakthrough pain  magnesium hydroxide Suspension 30 milliLiter(s) Oral every 12 hours PRN Constipation  naloxone Injectable 0.1 milliGRAM(s) IV Push every 3 minutes PRN For ANY of the following changes in patient status:  A. RR LESS THAN 10 breaths per minute, B. Oxygen saturation LESS THAN 90%, C. Sedation score of 6  ondansetron Injectable 4 milliGRAM(s) IV Push every 6 hours PRN Nausea  oxyCODONE    IR 5 milliGRAM(s) Oral every 4 hours PRN Mild Pain (1 - 3)  oxyCODONE    IR 10 milliGRAM(s) Oral every 4 hours PRN Moderate Pain (4 - 6)  zaleplon 5 milliGRAM(s) Oral at bedtime PRN Insomnia    Allergies: No Known Allergies    OBJECTIVE:  Vital Signs Last 24 Hrs  T(C): 36.2 (16 Jun 2019 14:07), Max: 37.1 (15 Fareed 2019 20:59)  T(F): 97.1 (16 Jun 2019 14:07), Max: 98.7 (15 Fareed 2019 20:59)  HR: 114 (16 Jun 2019 14:07) (108 - 120)  BP: 109/72 (16 Jun 2019 14:07) (93/51 - 126/79)  BP(mean): 71 (16 Jun 2019 01:17) (71 - 71)  RR: 17 (16 Jun 2019 14:07) (16 - 17)  SpO2: 97% (16 Jun 2019 14:07) (91% - 97%)  I&O's Summary    15 Fareed 2019 07:01  -  16 Jun 2019 07:00  --------------------------------------------------------  IN: 2560 mL / OUT: 85840 mL / NET: -25950 mL    16 Jun 2019 07:01  -  16 Jun 2019 16:51  --------------------------------------------------------  IN: 0 mL / OUT: 2400 mL / NET: -2400 mL    PHYSICAL EXAM:  Gen: NAD, lying in bed  HEENT: NCAT, MMM, clear OP  CV: RRR, no m/g/r appreciated  Pulm: CTA B, no w/r/r; no increase in WOB  Abd: normoactive BS, soft, NTND  : bed cannister w/very clear (no color) urine  Ext: WWP, 2+ pulses x4; no c/e/e  Neuro: AOx3, +TLSO brace, BLE 5/5  Psych: pleasant, conversant and appropriate    LABS:                        9.3    12.18 )-----------( 207      ( 16 Jun 2019 06:44 )             29.8     06-16    146<H>  |  108  |  6<L>  ----------------------------<  130<H>  3.8   |  27  |  0.61    Ca    9.0      16 Jun 2019 06:44    MICRODATA:  -- No new data.    RADIOLOGY/OTHER STUDIES:  -- No new imaging. Treat and Release

## 2019-06-16 NOTE — PROGRESS NOTE ADULT - SUBJECTIVE AND OBJECTIVE BOX
Interval history:  Seen this morning. Back pain slightly better. Pt able to ambulate a little.     58 yo F with hx of hysterectomy­ tubal pregnancy, hip replacement in Feb 2019­ bone cyst initially seen by me on 5/20/19 after found to have high ferritin levels on outside labs. Work up including HH, MM, flow all negative. On 6/3/19 follow up continued to complain of bilateral flank pain. Point tenderness noted in mid-thoracic area. Pt sent for CT of C/A/P- positive for spine/ rib/ iliac lesions. Tumor markers elevated for breast ca (no breast mass however). Due to CT findings pt sent to ortho spine out of concern for impending vertebral fractures. Now admitted for surgery which was performed on 6/10/19. This morning continues to have pain in back. Able to communicate adequately. Daughter by bedside.     Past medical history:  As above    Past surg history:  As above    Social history:  Smoker- approx 10 per day, - lives with  in [facility], no durgs, alcohol socially    Family history:   No fam hx of ca    Home Medications:  atomoxetine 60 mg oral capsule: 1 cap(s) orally once a day (in the morning) (09 Jun 2019 19:24)  FLUoxetine 20 mg oral tablet: 1 tab(s) orally once a day (09 Jun 2019 19:24)  metoprolol succinate 25 mg oral tablet, extended release: 1 tab(s) orally once a day (09 Jun 2019 19:23)    Allergies  No Known Allergies    Exam:  Vital Signs Last 24 Hrs  T(C): 37.2 (16 Jun 2019 22:09), Max: 37.2 (16 Jun 2019 22:09)  T(F): 98.9 (16 Jun 2019 22:09), Max: 98.9 (16 Jun 2019 22:09)  HR: 118 (16 Jun 2019 22:09) (108 - 122)  BP: 105/75 (16 Jun 2019 22:09) (93/51 - 126/79)  BP(mean): 71 (16 Jun 2019 01:17) (71 - 71)  RR: 16 (16 Jun 2019 22:09) (16 - 17)  SpO2: 95% (16 Jun 2019 22:09) (91% - 97%)    AAOx3, somewhat uncomfortable, NAD  No scleral icterus  No pallor  Midline incision on back- no oozing or bleeding this AM  Clear to auscultation  Tachycardic  + pulses, equal  No edema    Labs:  WBC 12, Hgb 9.3, plt 207  Cr normal. Liver normal.     Path: Adenocarcinoma of lung origin     Imaging:  CT of C/A/P reviewed

## 2019-06-16 NOTE — PROGRESS NOTE ADULT - ASSESSMENT
58 yo F with unspecified malignancy.    High ferritin in outpt likely from malignancy  Spine/ rib/ iliac lesions.   S/p debulking and stabalizing surgery.  Pathology from spinal specimen consistent with adenocarcinoma of lung origin.  Molecular testing- EGFR/ ROS/ ALK and PDL 1 studies ordered and pending.  Discussed different treatment options with pt and her family.  Final treatment will depend on further path testing.  Pt still needs MRI brain and baseline PET/CT.   Will send for imaging when recovers from surgery a little more- prob will not be able to lay on hard board through duration of MRI.   Cont pain control.  Incentive spirometer by bedside.   Needs DVT prophylaxis- on hep sq q8 hours  Magic mouth wash for mouth sores.  Will follow.  Thank you for all recs.    Breana Ramírez MD  [phone]

## 2019-06-17 LAB
ANION GAP SERPL CALC-SCNC: 14 MMOL/L — SIGNIFICANT CHANGE UP (ref 5–17)
BUN SERPL-MCNC: 7 MG/DL — SIGNIFICANT CHANGE UP (ref 7–23)
CALCIUM SERPL-MCNC: 9.1 MG/DL — SIGNIFICANT CHANGE UP (ref 8.4–10.5)
CHLORIDE SERPL-SCNC: 102 MMOL/L — SIGNIFICANT CHANGE UP (ref 96–108)
CO2 SERPL-SCNC: 26 MMOL/L — SIGNIFICANT CHANGE UP (ref 22–31)
CREAT SERPL-MCNC: 0.63 MG/DL — SIGNIFICANT CHANGE UP (ref 0.5–1.3)
GLUCOSE SERPL-MCNC: 199 MG/DL — HIGH (ref 70–99)
HCT VFR BLD CALC: 32.4 % — LOW (ref 34.5–45)
HGB BLD-MCNC: 10.3 G/DL — LOW (ref 11.5–15.5)
MCHC RBC-ENTMCNC: 30.5 PG — SIGNIFICANT CHANGE UP (ref 27–34)
MCHC RBC-ENTMCNC: 31.8 GM/DL — LOW (ref 32–36)
MCV RBC AUTO: 95.9 FL — SIGNIFICANT CHANGE UP (ref 80–100)
NRBC # BLD: 0 /100 WBCS — SIGNIFICANT CHANGE UP (ref 0–0)
PLATELET # BLD AUTO: 305 K/UL — SIGNIFICANT CHANGE UP (ref 150–400)
POTASSIUM SERPL-MCNC: 4.7 MMOL/L — SIGNIFICANT CHANGE UP (ref 3.5–5.3)
POTASSIUM SERPL-SCNC: 4.7 MMOL/L — SIGNIFICANT CHANGE UP (ref 3.5–5.3)
RBC # BLD: 3.38 M/UL — LOW (ref 3.8–5.2)
RBC # FLD: 17.4 % — HIGH (ref 10.3–14.5)
SODIUM SERPL-SCNC: 142 MMOL/L — SIGNIFICANT CHANGE UP (ref 135–145)
WBC # BLD: 14.49 K/UL — HIGH (ref 3.8–10.5)
WBC # FLD AUTO: 14.49 K/UL — HIGH (ref 3.8–10.5)

## 2019-06-17 PROCEDURE — 99233 SBSQ HOSP IP/OBS HIGH 50: CPT

## 2019-06-17 RX ORDER — METOPROLOL TARTRATE 50 MG
25 TABLET ORAL ONCE
Refills: 0 | Status: COMPLETED | OUTPATIENT
Start: 2019-06-17 | End: 2019-06-17

## 2019-06-17 RX ORDER — HYDROMORPHONE HYDROCHLORIDE 2 MG/ML
1 INJECTION INTRAMUSCULAR; INTRAVENOUS; SUBCUTANEOUS
Refills: 0 | Status: DISCONTINUED | OUTPATIENT
Start: 2019-06-17 | End: 2019-06-21

## 2019-06-17 RX ORDER — METOPROLOL TARTRATE 50 MG
50 TABLET ORAL DAILY
Refills: 0 | Status: DISCONTINUED | OUTPATIENT
Start: 2019-06-18 | End: 2019-06-21

## 2019-06-17 RX ADMIN — Medication 975 MILLIGRAM(S): at 13:25

## 2019-06-17 RX ADMIN — ATOMOXETINE HYDROCHLORIDE 60 MILLIGRAM(S): 10 CAPSULE ORAL at 11:30

## 2019-06-17 RX ADMIN — Medication 100 MILLIGRAM(S): at 21:18

## 2019-06-17 RX ADMIN — DIPHENHYDRAMINE HYDROCHLORIDE AND LIDOCAINE HYDROCHLORIDE AND ALUMINUM HYDROXIDE AND MAGNESIUM HYDRO 10 MILLILITER(S): KIT at 18:57

## 2019-06-17 RX ADMIN — Medication 20 MILLIGRAM(S): at 11:30

## 2019-06-17 RX ADMIN — Medication 975 MILLIGRAM(S): at 06:00

## 2019-06-17 RX ADMIN — OXYCODONE HYDROCHLORIDE 10 MILLIGRAM(S): 5 TABLET ORAL at 21:45

## 2019-06-17 RX ADMIN — Medication 100 MILLIGRAM(S): at 13:24

## 2019-06-17 RX ADMIN — OXYCODONE HYDROCHLORIDE 10 MILLIGRAM(S): 5 TABLET ORAL at 15:42

## 2019-06-17 RX ADMIN — OXYCODONE HYDROCHLORIDE 10 MILLIGRAM(S): 5 TABLET ORAL at 06:45

## 2019-06-17 RX ADMIN — Medication 25 MILLIGRAM(S): at 11:31

## 2019-06-17 RX ADMIN — HEPARIN SODIUM 5000 UNIT(S): 5000 INJECTION INTRAVENOUS; SUBCUTANEOUS at 06:00

## 2019-06-17 RX ADMIN — Medication 975 MILLIGRAM(S): at 13:59

## 2019-06-17 RX ADMIN — DIPHENHYDRAMINE HYDROCHLORIDE AND LIDOCAINE HYDROCHLORIDE AND ALUMINUM HYDROXIDE AND MAGNESIUM HYDRO 10 MILLILITER(S): KIT at 11:31

## 2019-06-17 RX ADMIN — HEPARIN SODIUM 5000 UNIT(S): 5000 INJECTION INTRAVENOUS; SUBCUTANEOUS at 21:18

## 2019-06-17 RX ADMIN — Medication 1 APPLICATION(S): at 11:31

## 2019-06-17 RX ADMIN — HYDROMORPHONE HYDROCHLORIDE 1 MILLIGRAM(S): 2 INJECTION INTRAMUSCULAR; INTRAVENOUS; SUBCUTANEOUS at 03:40

## 2019-06-17 RX ADMIN — Medication 975 MILLIGRAM(S): at 06:45

## 2019-06-17 RX ADMIN — Medication 100 MILLIGRAM(S): at 06:00

## 2019-06-17 RX ADMIN — HEPARIN SODIUM 5000 UNIT(S): 5000 INJECTION INTRAVENOUS; SUBCUTANEOUS at 13:24

## 2019-06-17 RX ADMIN — OXYCODONE HYDROCHLORIDE 10 MILLIGRAM(S): 5 TABLET ORAL at 00:40

## 2019-06-17 RX ADMIN — HYDROMORPHONE HYDROCHLORIDE 1 MILLIGRAM(S): 2 INJECTION INTRAMUSCULAR; INTRAVENOUS; SUBCUTANEOUS at 03:15

## 2019-06-17 RX ADMIN — Medication 25 MILLIGRAM(S): at 06:00

## 2019-06-17 RX ADMIN — OXYCODONE HYDROCHLORIDE 10 MILLIGRAM(S): 5 TABLET ORAL at 21:18

## 2019-06-17 RX ADMIN — DIPHENHYDRAMINE HYDROCHLORIDE AND LIDOCAINE HYDROCHLORIDE AND ALUMINUM HYDROXIDE AND MAGNESIUM HYDRO 10 MILLILITER(S): KIT at 06:01

## 2019-06-17 RX ADMIN — OXYCODONE HYDROCHLORIDE 10 MILLIGRAM(S): 5 TABLET ORAL at 06:10

## 2019-06-17 RX ADMIN — SENNA PLUS 2 TABLET(S): 8.6 TABLET ORAL at 21:18

## 2019-06-17 RX ADMIN — Medication 975 MILLIGRAM(S): at 21:18

## 2019-06-17 RX ADMIN — OXYCODONE HYDROCHLORIDE 10 MILLIGRAM(S): 5 TABLET ORAL at 16:44

## 2019-06-17 RX ADMIN — OXYCODONE HYDROCHLORIDE 10 MILLIGRAM(S): 5 TABLET ORAL at 11:31

## 2019-06-17 RX ADMIN — FAMOTIDINE 20 MILLIGRAM(S): 10 INJECTION INTRAVENOUS at 11:29

## 2019-06-17 RX ADMIN — FAMOTIDINE 20 MILLIGRAM(S): 10 INJECTION INTRAVENOUS at 21:17

## 2019-06-17 RX ADMIN — OXYCODONE HYDROCHLORIDE 10 MILLIGRAM(S): 5 TABLET ORAL at 12:15

## 2019-06-17 RX ADMIN — Medication 975 MILLIGRAM(S): at 21:45

## 2019-06-17 NOTE — PROGRESS NOTE ADULT - SUBJECTIVE AND OBJECTIVE BOX
ORTHO NOTE    [x ] Pt seen/examined with Dr. Mancia at 8:10am  [ ] Pt without any complaints/in NAD.    [x ] Pt complains of: incisional pain controlled on regimen      ROS: [ ] Fever  [ ] Chills  [ ] CP [ ] SOB [ ] Dysnea  [ ] Palpitations [ ] Cough [ ] N/V/C/D [ ] Paresthia [ ] Other     [x ] ROS  otherwise negative    .    PHYSICAL EXAM:    Vital Signs Last 24 Hrs  T(C): 37.1 (17 Jun 2019 14:17), Max: 37.3 (17 Jun 2019 00:15)  T(F): 98.8 (17 Jun 2019 14:17), Max: 99.1 (17 Jun 2019 00:15)  HR: 112 (17 Jun 2019 14:35) (110 - 122)  BP: 100/63 (17 Jun 2019 14:35) (100/63 - 131/89)  BP(mean): --  RR: 19 (17 Jun 2019 14:17) (16 - 19)  SpO2: 96% (17 Jun 2019 14:35) (94% - 99%)    I&O's Detail    16 Jun 2019 07:01  -  17 Jun 2019 07:00  --------------------------------------------------------  IN:    IV PiggyBack: 200 mL    Oral Fluid: 6380 mL  Total IN: 6580 mL    OUT:    Voided: 99613 mL  Total OUT: 86624 mL    Total NET: -5570 mL      17 Jun 2019 07:01  -  17 Jun 2019 16:37  --------------------------------------------------------  IN:    Oral Fluid: 2780 mL  Total IN: 2780 mL    OUT:    Voided: 4200 mL  Total OUT: 4200 mL    Total NET: -1420 mL           CAPILLARY BLOOD GLUCOSE                      Neuro: AAOX3    Lungs: nonlabored, spo2 99% on RA, IS demonstrated    CV: sinus tachycardia HR 110s-120bpm    ABD: soft, nontender    Ext:  posterior cervical to thoracic gauze and tegaderm dsg cdi   	paracervical tenderness L > R   	L D/B 4+, T/WE/WF/ strength 5  	R D/B/T/WE/WF/  strength 5   	SILT C5-T1 bilat   	2+ rad bilat                   LABS                        10.3   14.49 )-----------( 305      ( 17 Jun 2019 08:55 )             32.4                                06-17    142  |  102  |  7   ----------------------------<  199<H>  4.7   |  26  |  0.63    Ca    9.1      17 Jun 2019 08:55        [ ] Other Labs  [ ] None ordered            Please check or [place] when present:  •  Heart Failure:    [ ] Acute        [ ]  Acute on Chronic        [ ] Chronic         [ ] Diastolic     [ ]  Combined    •  CATHY:     [ ] ATN        [ ]  Renal medullary necrosis       [ ]  Renal cortical necrosis                  [ ] Other pathological Lesion:  •  CKD:  [ ] Stage I   [ ] Stage II  [ ] Stage III    [ ]Stage IV   [ ]  CKD V   [ ]  Other/Unspecified:    •  Abdominal Nutritional Status:   [ ] Malnutrition-See Nutrition note    [ ] Cachexia   [ ]  Other        [ ] Supplement ordered:            [ ] Morbid Obesity: BMI >=40         ASSESSMENT/PLAN:      STATUS POST: PSF C6-T6 (6/12); Partial corpectomy T10; PSF T7-12 (6/10)   sinus tachycardia- d/w medicine attending Dr. Eddy who does not want PE workup given Spo2 99% on RA.  Beta blockers adjusted  pain control  bowel regimen  log rolling demonstrated  appreciate continue oncology workup and recommendations  continue telemetry to monitor HR  CONTINUE:          [ ] PT- wbat, spinal precautions    [ ] DVT PPX- scd, subqH q 8 hours    [ ] Pain Mgt-po meds     [ ] Dispo plan- COBY

## 2019-06-17 NOTE — PROGRESS NOTE ADULT - SUBJECTIVE AND OBJECTIVE BOX
CC: No overnight events, no complaints.   Feeling well, pain is overall controlled.  Tolerates PO diet (+); Urination (+); Walked with PT (+)  Denies cp, sob, dizziness, HA, abdominal pain, n/v.  Rest of ROS negative.     Vital Signs Last 24 Hrs  T(C): 37.1 (17 Jun 2019 14:17), Max: 37.3 (17 Jun 2019 00:15)  T(F): 98.8 (17 Jun 2019 14:17), Max: 99.1 (17 Jun 2019 00:15)  HR: 112 (17 Jun 2019 14:35) (110 - 122)  BP: 100/63 (17 Jun 2019 14:35) (100/63 - 131/89)  BP(mean): --  RR: 19 (17 Jun 2019 14:17) (16 - 19)  SpO2: 96% (17 Jun 2019 14:35) (94% - 99%)    PHYSICAL EXAMINATION  * General: Not in acute distress. Awake and alert. Lying comfortably in bed.  * Head: Normocephalic, atraumatic.  * HEENT: ears no discharge, eyes PERRLA, nose no discharge, throat no exudates, normal tonsils.  * Neck: no JVD, supple.  * Lungs: Clear to auscultation, no rales, no wheezes.  * Cardio: Regular rate and rhythm, no murmurs, no rubs, no gallops. Good peripheral pulses.  * Abdomen: Soft, non-tender, non-distended, tympanic to percussion, no rebound, no guarding, no rigidity. Bowel sounds present. No suprapubic or CVA tenderness.  * : Deferred.  * Extremities: Acyanotic, no edema.  * Skin: Warm and dry.  * Neuro: Alert and oriented x 3. No focal deficits. Motor strength is 5/5 throughout. Sensation intact. Cranial nerves II-XII grossly intact.                           10.3   14.49 )-----------( 305      ( 17 Jun 2019 08:55 )             32.4   06-17    142  |  102  |  7   ----------------------------<  199<H>  4.7   |  26  |  0.63    Ca    9.1      17 Jun 2019 08:55    MEDICATIONS  (STANDING):  acetaminophen   Tablet .. 975 milliGRAM(s) Oral every 8 hours  albumin human  5% IVPB 250 milliLiter(s) IV Intermittent once  atoMOXetine 60 milliGRAM(s) Oral daily  BACItracin   Ointment 1 Application(s) Topical daily  BUpivacaine liposome 1.3% Injectable (no eMAR) 20 milliLiter(s) Local Injection once  docusate sodium 100 milliGRAM(s) Oral three times a day  famotidine    Tablet 20 milliGRAM(s) Oral every 12 hours  FIRST- Mouthwash  BLM 10 milliLiter(s) Swish and Spit every 6 hours  FLUoxetine 20 milliGRAM(s) Oral daily  heparin  Injectable 5000 Unit(s) SubCutaneous every 8 hours  lidocaine   Patch 2 Patch Transdermal daily  polyethylene glycol 3350 17 Gram(s) Oral daily  senna 2 Tablet(s) Oral at bedtime    MEDICATIONS  (PRN):  acetaminophen   Tablet .. 650 milliGRAM(s) Oral every 6 hours PRN Temp greater or equal to 38C (100.4F)  aluminum hydroxide/magnesium hydroxide/simethicone Suspension 30 milliLiter(s) Oral four times a day PRN Indigestion  bisacodyl Suppository 10 milliGRAM(s) Rectal daily PRN If no bowel movement by POD#2  cyclobenzaprine 5 milliGRAM(s) Oral three times a day PRN Muscle Spasm  HYDROmorphone  Injectable 1 milliGRAM(s) IV Push every 2 hours PRN breakthrough pain  magnesium hydroxide Suspension 30 milliLiter(s) Oral every 12 hours PRN Constipation  naloxone Injectable 0.1 milliGRAM(s) IV Push every 3 minutes PRN For ANY of the following changes in patient status:  A. RR LESS THAN 10 breaths per minute, B. Oxygen saturation LESS THAN 90%, C. Sedation score of 6  ondansetron Injectable 4 milliGRAM(s) IV Push every 6 hours PRN Nausea  oxyCODONE    IR 5 milliGRAM(s) Oral every 4 hours PRN Mild Pain (1 - 3)  oxyCODONE    IR 10 milliGRAM(s) Oral every 4 hours PRN Moderate Pain (4 - 6)

## 2019-06-17 NOTE — PROGRESS NOTE ADULT - SUBJECTIVE AND OBJECTIVE BOX
Neurology Follow up note    Name  YANETH QUESADA    HPI:  59F s/p R LISA (2/2019) p/w back pain which started earlier this year. Pt believed it to be related to her hip and went to see her PCP. She was diagnozed with anemia of chronic diease- further workup was done in the ER. She underwent a CT spine which demonstarted lytic lesions in spine concerning for metastatci disease. She went to see Dr. Matos for spine consultation who recommended surgery for tumor debulking and posterior spinal fusion. Currently denies numbness and tingilng. Endorses band like pain around mid-torso. No bowel or bladder incontinence. No gait imbalances. Of note, patient is a 34 year 0.5 pack smoker. (09 Jun 2019 13:07)      Interval History - back pain and radicular symptoms in the LE- less pain        REVIEW OF SYSTEMS    Vital Signs Last 24 Hrs  T(C): 37.2 (17 Jun 2019 05:52), Max: 37.3 (17 Jun 2019 00:15)  T(F): 98.9 (17 Jun 2019 05:52), Max: 99.1 (17 Jun 2019 00:15)  HR: 121 (17 Jun 2019 05:52) (108 - 122)  BP: 117/70 (17 Jun 2019 05:52) (103/73 - 126/79)  BP(mean): --  RR: 17 (17 Jun 2019 05:52) (16 - 17)  SpO2: 95% (17 Jun 2019 05:52) (94% - 97%)    Physical Exam-     Mental Status- awake and alert    Cranial Nerves- full EOM    Gait and station- n/a    Motor- moves all 4 extremities    Reflexes- decreased    Sensation-no sensory level    Coordination- no tremors    Vascular - no bruits    Medications  acetaminophen   Tablet .. 650 milliGRAM(s) Oral every 6 hours PRN  acetaminophen   Tablet .. 975 milliGRAM(s) Oral every 8 hours  albumin human  5% IVPB 250 milliLiter(s) IV Intermittent once  aluminum hydroxide/magnesium hydroxide/simethicone Suspension 30 milliLiter(s) Oral four times a day PRN  atoMOXetine 60 milliGRAM(s) Oral daily  BACItracin   Ointment 1 Application(s) Topical daily  bisacodyl Suppository 10 milliGRAM(s) Rectal daily PRN  BUpivacaine liposome 1.3% Injectable (no eMAR) 20 milliLiter(s) Local Injection once  cyclobenzaprine 5 milliGRAM(s) Oral three times a day PRN  docusate sodium 100 milliGRAM(s) Oral three times a day  famotidine    Tablet 20 milliGRAM(s) Oral every 12 hours  FIRST- Mouthwash  BLM 10 milliLiter(s) Swish and Spit every 6 hours  FLUoxetine 20 milliGRAM(s) Oral daily  heparin  Injectable 5000 Unit(s) SubCutaneous every 8 hours  HYDROmorphone  Injectable 1 milliGRAM(s) IV Push every 2 hours PRN  lidocaine   Patch 2 Patch Transdermal daily  magnesium hydroxide Suspension 30 milliLiter(s) Oral every 12 hours PRN  metoprolol succinate ER 25 milliGRAM(s) Oral daily  naloxone Injectable 0.1 milliGRAM(s) IV Push every 3 minutes PRN  ondansetron Injectable 4 milliGRAM(s) IV Push every 6 hours PRN  oxyCODONE    IR 5 milliGRAM(s) Oral every 4 hours PRN  oxyCODONE    IR 10 milliGRAM(s) Oral every 4 hours PRN  polyethylene glycol 3350 17 Gram(s) Oral daily  senna 2 Tablet(s) Oral at bedtime      Lab      Radiology    Assessment- Lumbar radiculopathy    Plan as per ortho

## 2019-06-17 NOTE — PROGRESS NOTE ADULT - SUBJECTIVE AND OBJECTIVE BOX
Interval history:  Seen this morning. Back pain continues to improve. Pt able to ambulate a little.     58 yo F with hx of hysterectomy­ tubal pregnancy, hip replacement in Feb 2019­ bone cyst initially seen by me on 5/20/19 after found to have high ferritin levels on outside labs. Work up including HH, MM, flow all negative. On 6/3/19 follow up continued to complain of bilateral flank pain. Point tenderness noted in mid-thoracic area. Pt sent for CT of C/A/P- positive for spine/ rib/ iliac lesions. Tumor markers elevated for breast ca (no breast mass however). Due to CT findings pt sent to ortho spine out of concern for impending vertebral fractures. Now admitted for surgery which was performed on 6/10/19. This morning continues to have pain in back. Able to communicate adequately. Daughter by bedside.     Past medical history:  As above    Past surg history:  As above    Social history:  Smoker- approx 10 per day, - lives with  in [facility], no durgs, alcohol socially    Family history:   No fam hx of ca    Home Medications:  atomoxetine 60 mg oral capsule: 1 cap(s) orally once a day (in the morning) (09 Jun 2019 19:24)  FLUoxetine 20 mg oral tablet: 1 tab(s) orally once a day (09 Jun 2019 19:24)  metoprolol succinate 25 mg oral tablet, extended release: 1 tab(s) orally once a day (09 Jun 2019 19:23)    Allergies  No Known Allergies    Exam:  Vital Signs Last 24 Hrs  T(C): 35.9 (17 Jun 2019 17:16), Max: 37.3 (17 Jun 2019 00:15)  T(F): 96.7 (17 Jun 2019 17:16), Max: 99.1 (17 Jun 2019 00:15)  HR: 105 (17 Jun 2019 17:16) (105 - 122)  BP: 103/66 (17 Jun 2019 17:16) (100/63 - 131/89)  BP(mean): --  RR: 18 (17 Jun 2019 17:16) (16 - 19)  SpO2: 96% (17 Jun 2019 17:16) (94% - 99%)    AAOx3, somewhat uncomfortable, NAD  No scleral icterus  No pallor  Midline incision on back- no oozing or bleeding this AM  Clear to auscultation  Tachycardic  + pulses, equal  No edema    Labs:  WBC 14, Hgb 10.3, plt 304  Cr normal. Liver normal.     Path: Adenocarcinoma of lung origin     Imaging:  CT of C/A/P reviewed

## 2019-06-17 NOTE — PROGRESS NOTE ADULT - ASSESSMENT
60 yo F with unspecified malignancy.    High ferritin in outpt from malignancy  Spine/ rib/ iliac lesions.   S/p debulking and stabalizing surgery.  Pathology from spinal specimen consistent with adenocarcinoma of lung origin.  Molecular testing- EGFR/ ROS/ ALK and PDL 1 studies ordered and pending.  Discussed different treatment options with pt and her family.  Final treatment will depend on further path testing.  Pt still needs MRI brain and baseline PET/CT.   Will send for imaging when recovers from surgery a little more- prob will not be able to lay on hard board through duration of MRI.   Cont pain control.  Incentive spirometer by bedside.   CXR ordered for the morning- pt continues to have shortness of breath with minimal movement, tachycardic, had spike in WBC after initial downward trend after surg.   Ruling out fluid overload or new infiltrate.   Needs DVT prophylaxis- on hep sq q8 hours  Magic mouth wash for mouth sores.  Will follow.  Thank you for all recs.    Breana Ramírez MD  [phone]

## 2019-06-17 NOTE — PROGRESS NOTE ADULT - ASSESSMENT
58yo woman with a PMH of SBO, R LISA and newly diagnosed, unspecified metastatic cancer with lytic bone lesions who presented for spine surgery in the setting of lytic lesions and epidural invasion at T10.  Now s/p PSF C6-T6 (6/12), partial corpectomy at T10 and  PSF at T7-12 (6/10).  Post-op course c/b pain and tachycardia.     1) Sinus tachycardia, likely 2/2 deconditioning and pain.   * Needs Incentive spirometry  * Pain control -pain management  * Please give extra 25mg metoprolol tartrate.   * Increase Toprol XL to 50mg daily, starting tomorrow 6/18 in am.    2) HTN,  * Increase Toprol XL to 50mg daily, starting tomorrow 6/18 in am.    3) Metastatic spinal disease  s/p debulking sx.    4) Post-op state  * OOB-C  * Physical therapy evaluation  * Aggressive incentive spirometry  * Pain control and bowel regimen  * Mechanical LE ppx   * Keep telemetry for now.    5) VTE ppx.   *  SCDs and ambulation as possible

## 2019-06-17 NOTE — PROGRESS NOTE ADULT - SUBJECTIVE AND OBJECTIVE BOX
Ortho    Procedure: PSF C6-T6 (6/12); Partial corpectomy T10; PSF T7-12 (6/10)   Surgeon: Carlo    Pain controlled this am. Working with PT. Feels more mobile but c/o of multiple trips to bathroom due to increased PO intake/thirst   Denies CP, SOB, N/V, numbness/tingling     Vital Signs Last 24 Hrs  T(C): 37.2 (17 Jun 2019 05:52), Max: 37.3 (17 Jun 2019 00:15)  T(F): 98.9 (17 Jun 2019 05:52), Max: 99.1 (17 Jun 2019 00:15)  HR: 121 (17 Jun 2019 05:52) (108 - 122)  BP: 117/70 (17 Jun 2019 05:52) (103/73 - 126/79)  BP(mean): --  RR: 17 (17 Jun 2019 05:52) (16 - 17)  SpO2: 95% (17 Jun 2019 05:52) (94% - 97%)    General: Pt Alert and oriented, NAD  Neck and back DSG C/D/I, dsg c/d/i   	paracervical tenderness L > R   	L D/B 4+, T/WE/WF/ strength 5  	R D/B/T/WE/WF/  strength 5   	SILT C5-T1 bilat   	2+ rad bilat                 A/P: 59yFemale s/p PSF C6-T6 (6/12); Partial corpectomy T10; PSF T7-12 (6/10)   - Stable  - Pain Control  - DVT ppx: SCDs  - PT, WBS: WBAT  - dispo: Progressing with PT     Ortho Pager [phone]

## 2019-06-17 NOTE — PROGRESS NOTE ADULT - SUBJECTIVE AND OBJECTIVE BOX
Pain Management Progress Note - [facility] [phone]    HPI: Patient seen during morning rounds, in NAD.       Pertinent PMH: Pain at: ___Back ___Neck___Knee ___Hip ___Shoulder ___ Opioid tolerance    Pain is ___ sharp ____dull ___burning ___achy ___ Intensity: ____ mild ____mod ____severe     Location _____surgical site _____cervical _____lumbar ____abd _____upper ext____lower ext    Worse with ____activity ____movement _____physical therapy___ Rest    Improved with ____medication ____rest ____physical therapy    sodium chloride 0.9% Bolus  HYDROmorphone  Injectable  oxyCODONE    IR  oxyCODONE    IR  oxyCODONE  ER Tablet  morphine  - Injectable  HYDROmorphone  Injectable  famotidine    Tablet  magnesium hydroxide Suspension  bisacodyl Suppository  docusate sodium  zaleplon  lactated ringers.  FLUoxetine  metoprolol succinate ER  atoMOXetine  morphine  - Injectable  (Floorstock)  (Floorstock)  (Floorstock)  (Floorstock)  (Floorstock)  (Floorstock)  (Floorstock)  HYDROmorphone PCA (1 mG/mL)  HYDROmorphone PCA (1 mG/mL) Rescue Clinician Bolus  naloxone Injectable  ondansetron Injectable  cyclobenzaprine  (Floorstock)  (Floorstock)  BUpivacaine liposome 1.3% Injectable (no eMAR)  (Floorstock)  (Floorstock)  (Floorstock)  (Floorstock)  (Floorstock)  (Floorstock)  (Floorstock)  (Floorstock)  (Floorstock)  (Floorstock)  lactated ringers.  acetaminophen   Tablet ..  ceFAZolin   IVPB  docusate sodium  magnesium hydroxide Suspension  senna  gentamicin   IVPB  ceFAZolin  Injectable.  albumin human  5% IVPB  ceFAZolin  Injectable.  (ADM OVERRIDE)  lactated ringers.  oxyCODONE    IR  oxyCODONE    IR  HYDROmorphone  Injectable  lidocaine   Patch  (ADM OVERRIDE)  (ADM OVERRIDE)  BACItracin   Ointment  (Floorstock)  (Floorstock)  lactated ringers.  acetaminophen   Tablet ..  oxyCODONE    IR  oxyCODONE    IR  morphine  - Injectable  HYDROmorphone  Injectable  aluminum hydroxide/magnesium hydroxide/simethicone Suspension  ondansetron Injectable  polyethylene glycol 3350  bisacodyl Suppository  senna  docusate sodium  metoprolol succinate ER  zaleplon  FLUoxetine  famotidine    Tablet  cyclobenzaprine  (Floorstock)  (Floorstock)  (Floorstock)  (Floorstock)  (Floorstock)  (Floorstock)  (Floorstock)  (Floorstock)  (Floorstock)  dexmedetomidine Infusion  dexmedetomidine Infusion  (Floorstock)  (Floorstock)  (Floorstock)  (Floorstock)  (Floorstock)  gentamicin   IVPB  ceFAZolin  Injectable.  albumin human  5% IVPB  albumin human  5% IVPB  fentaNYL   Infusion  calcium gluconate IVPB  fentaNYL   Infusion  (ADM OVERRIDE)  potassium chloride   Powder  (ADM OVERRIDE)  lactated ringers Bolus  morphine  - Injectable  (ADM OVERRIDE)  (ADM OVERRIDE)  acetaminophen   Tablet ..  (ADM OVERRIDE)  heparin  Injectable  (ADM OVERRIDE)  FIRST- Mouthwash  BLM  (ADM OVERRIDE)  (ADM OVERRIDE)  (ADM OVERRIDE)  heparin  Injectable  (ADM OVERRIDE)      ROS: Const:  ___febrile   Eyes:___ENT:___CV: ___chest pain  Resp: ____sob  GI:___nausea ___vomiting ____abd pain ___npo ___clears ___full diet __bm  :___ Musk: ___pain ___spasm  Skin:___ Neuro:  ___sedation___confusion____ numbness ___weakness ___paresthesia  Psych:___anxiety  Endo:___ Heme:___Allergy:___          Hemoglobin: 9.3 g/dL (06-16 @ 06:44)        T(C): 36.9 (06-17-19 @ 08:55), Max: 37.3 (06-17-19 @ 00:15)  HR: 122 (06-17-19 @ 08:55) (108 - 122)  BP: 126/77 (06-17-19 @ 08:55) (103/73 - 126/79)  RR: 19 (06-17-19 @ 08:55) (16 - 19)  SpO2: 99% (06-17-19 @ 08:55) (94% - 99%)  Wt(kg): --     PHYSICAL EXAM:  Gen Appearance: ___no acute distress ___appropriate         Neuro: ___SILT feet____ EOM Intact Psych: AAOX__, ___mood/affect appropriate        Eyes: ___conjunctiva WNL  _____ Pupils equal and round        ENT: ___ears and nose atraumatic___ Hearing grossly intact        Neck: ___trachea midline, no visible masses ___thyroid without palpable mass    Resp: ___Nml WOB____No tactile fremitus ___clear to auscultation    Cardio: ___extremities free from edema ____pedal pulses palpable    GI/Abdomen: ___soft _____ Nontender______Nondistended_____HSM    Lymphatic: ___no palpable nodes in neck  ___no palpable nodes calves and feet    Skin/Wound: ___Incision, ___Dressing c/d/i,   ____surrounding tissues soft,  ___drain/chest tube present____    Muscular: EHL ___/5  Gastrocnemius___/5    ___absent clubbing/cyanosis         ASSESSMENT:  This is a 59y old Female with a history of:  INTRACTABLE PAIN, METASTATIC DISEASE  Handoff  MEWS Score  No pertinent past medical history  Thoracic spine tumor  Thoracic spine tumor  Intractable pain  Fusion of 7 to 12 spinal segments by posterior approach  Partial corpectomy of thoracic spine at single level  History of small bowel obstruction  BACK PAIN  5  Metastatic disease        Recommended Treatment PLAN: [facility] [phone]    HPI: Patient seen during morning rounds, in NAD. States pain is well controlled with current regimen.       Pertinent PMH: Pain at: __x_Back ___Neck___Knee ___Hip ___Shoulder ___ Opioid tolerance    Pain is __x_ sharp ____dull ___burning ___achy ___ Intensity: ____ mild ____mod ____severe     Location ____x_surgical site _____cervical __x___lumbar ____abd _____upper ext____lower ext    Worse with __x__activity _x___movement _____physical therapy___ Rest    Improved with __x__medication __x__rest ____physical therapy    sodium chloride 0.9% Bolus  HYDROmorphone  Injectable  oxyCODONE    IR  oxyCODONE  ER Tablet  morphine  - Injectable  HYDROmorphone  Injectable  famotidine    Tablet  magnesium hydroxide Suspension  bisacodyl Suppository  docusate sodium  zaleplon  lactated ringers.  FLUoxetine  metoprolol succinate ER  atoMOXetine  morphine  - Injectable  HYDROmorphone PCA (1 mG/mL)  HYDROmorphone PCA (1 mG/mL) Rescue Clinician Bolus  naloxone Injectable  ondansetron Injectable  cyclobenzaprine  lactated ringers.  acetaminophen   Tablet ..  ceFAZolin   IVPB  docusate sodium  magnesium hydroxide Suspension  senna  gentamicin   IVPB  ceFAZolin  Injectable.  albumin human  5% IVPB  morphine  - Injectable  HYDROmorphone  Injectable  aluminum hydroxide/magnesium hydroxide/simethicone Suspension  ondansetron Injectable  polyethylene glycol 3350  bisacodyl Suppository  senna  docusate sodium  metoprolol succinate ER  zaleplon  FLUoxetine  gentamicin   IVPB  ceFAZolin  Injectable.  albumin human  5% IVPB      ROS: Const:  _-__febrile   Eyes:___ENT:___CV: __-_chest pain  Resp: __-__sob  GI:___nausea ___vomiting __-__abd pain ___npo ___clears ___full diet __bm  :___ Musk: __x_pain ___spasm  Skin:___ Neuro:  ___sedation___confusion____ numbness ___weakness ___paresthesia  Psych:___anxiety  Endo:___ Heme:___Allergy:___  __x__ all other systems reviewed and negative       Hemoglobin: 9.3 g/dL (06-16 @ 06:44)      T(C): 36.9 (06-17-19 @ 08:55), Max: 37.3 (06-17-19 @ 00:15)  HR: 122 (06-17-19 @ 08:55) (108 - 122)  BP: 126/77 (06-17-19 @ 08:55) (103/73 - 126/79)  RR: 19 (06-17-19 @ 08:55) (16 - 19)  SpO2: 99% (06-17-19 @ 08:55) (94% - 99%)  Wt(kg): --     PHYSICAL EXAM:  Gen Appearance: _x__no acute distress _x__appropriate    Neuro: _x__SILT feet____ EOM Intact Psych: AAOX_3_, __x_mood/affect appropriate        Eyes: _x__conjunctiva WNL  _____ Pupils equal and round        ENT: __x_ears and nose atraumatic__x_ Hearing grossly intact        Neck: _x__trachea midline, no visible masses ___thyroid without palpable mass    Resp: __x_Nml WOB____No tactile fremitus ___clear to auscultation    Cardio: _x__extremities free from edema __x__pedal pulses palpable    GI/Abdomen: __x_soft ___x__ Nontender__x____Nondistended_____HSM    Lymphatic: ___no palpable nodes in neck  ___no palpable nodes calves and feet    Skin/Wound: ___Incision, _x__Dressing c/d/i,   ____surrounding tissues soft,  ___drain/chest tube present____    Muscular: EHL __5_/5  Gastrocnemius_5__/5    _x__absent clubbing/cyanosis         ASSESSMENT:  This is a 59y old Female with a history of:  INTRACTABLE PAIN, METASTATIC DISEASE  Handoff  MEWS Score  No pertinent past medical history  Thoracic spine tumor  Intractable pain  Fusion of 7 to 12 spinal segments by posterior approach  Partial corpectomy of thoracic spine at single level  History of small bowel obstruction  BACK PAIN  5  Metastatic disease        Recommended Treatment PLAN:    1. Continue current regimen as patient is tolerating and responding well.   Plan discussed with Dr. Handy    Patient seen, evaluated and examined by attending physician, Dr. Handy.  PA active as scribe.

## 2019-06-18 ENCOUNTER — TRANSCRIPTION ENCOUNTER (OUTPATIENT)
Age: 59
End: 2019-06-18

## 2019-06-18 LAB
ANION GAP SERPL CALC-SCNC: 10 MMOL/L — SIGNIFICANT CHANGE UP (ref 5–17)
BUN SERPL-MCNC: 8 MG/DL — SIGNIFICANT CHANGE UP (ref 7–23)
CALCIUM SERPL-MCNC: 9.6 MG/DL — SIGNIFICANT CHANGE UP (ref 8.4–10.5)
CHLORIDE SERPL-SCNC: 98 MMOL/L — SIGNIFICANT CHANGE UP (ref 96–108)
CO2 SERPL-SCNC: 28 MMOL/L — SIGNIFICANT CHANGE UP (ref 22–31)
CREAT SERPL-MCNC: 0.66 MG/DL — SIGNIFICANT CHANGE UP (ref 0.5–1.3)
GLUCOSE SERPL-MCNC: 119 MG/DL — HIGH (ref 70–99)
HCT VFR BLD CALC: 34 % — LOW (ref 34.5–45)
HGB BLD-MCNC: 10.4 G/DL — LOW (ref 11.5–15.5)
MCHC RBC-ENTMCNC: 29.9 PG — SIGNIFICANT CHANGE UP (ref 27–34)
MCHC RBC-ENTMCNC: 30.6 GM/DL — LOW (ref 32–36)
MCV RBC AUTO: 97.7 FL — SIGNIFICANT CHANGE UP (ref 80–100)
NRBC # BLD: 0 /100 WBCS — SIGNIFICANT CHANGE UP (ref 0–0)
PLATELET # BLD AUTO: 378 K/UL — SIGNIFICANT CHANGE UP (ref 150–400)
POTASSIUM SERPL-MCNC: 5.3 MMOL/L — SIGNIFICANT CHANGE UP (ref 3.5–5.3)
POTASSIUM SERPL-SCNC: 5.3 MMOL/L — SIGNIFICANT CHANGE UP (ref 3.5–5.3)
RBC # BLD: 3.48 M/UL — LOW (ref 3.8–5.2)
RBC # FLD: 17.6 % — HIGH (ref 10.3–14.5)
SODIUM SERPL-SCNC: 136 MMOL/L — SIGNIFICANT CHANGE UP (ref 135–145)
WBC # BLD: 16.48 K/UL — HIGH (ref 3.8–10.5)
WBC # FLD AUTO: 16.48 K/UL — HIGH (ref 3.8–10.5)

## 2019-06-18 PROCEDURE — 71045 X-RAY EXAM CHEST 1 VIEW: CPT | Mod: 26

## 2019-06-18 PROCEDURE — 99233 SBSQ HOSP IP/OBS HIGH 50: CPT

## 2019-06-18 RX ORDER — MULTIVIT-MIN/FERROUS GLUCONATE 9 MG/15 ML
1 LIQUID (ML) ORAL DAILY
Refills: 0 | Status: DISCONTINUED | OUTPATIENT
Start: 2019-06-18 | End: 2019-06-21

## 2019-06-18 RX ORDER — SENNA PLUS 8.6 MG/1
2 TABLET ORAL
Qty: 0 | Refills: 0 | DISCHARGE
Start: 2019-06-18

## 2019-06-18 RX ORDER — CYCLOBENZAPRINE HYDROCHLORIDE 10 MG/1
1 TABLET, FILM COATED ORAL
Qty: 0 | Refills: 0 | DISCHARGE
Start: 2019-06-18

## 2019-06-18 RX ORDER — DIPHENHYDRAMINE HYDROCHLORIDE AND LIDOCAINE HYDROCHLORIDE AND ALUMINUM HYDROXIDE AND MAGNESIUM HYDRO
0 KIT
Qty: 0 | Refills: 0 | DISCHARGE
Start: 2019-06-18

## 2019-06-18 RX ADMIN — Medication 975 MILLIGRAM(S): at 06:15

## 2019-06-18 RX ADMIN — Medication 100 MILLIGRAM(S): at 05:30

## 2019-06-18 RX ADMIN — POLYETHYLENE GLYCOL 3350 17 GRAM(S): 17 POWDER, FOR SOLUTION ORAL at 12:08

## 2019-06-18 RX ADMIN — Medication 1 TABLET(S): at 13:55

## 2019-06-18 RX ADMIN — LIDOCAINE 2 PATCH: 4 CREAM TOPICAL at 19:03

## 2019-06-18 RX ADMIN — Medication 100 MILLIGRAM(S): at 21:07

## 2019-06-18 RX ADMIN — Medication 975 MILLIGRAM(S): at 13:56

## 2019-06-18 RX ADMIN — Medication 975 MILLIGRAM(S): at 05:30

## 2019-06-18 RX ADMIN — CYCLOBENZAPRINE HYDROCHLORIDE 5 MILLIGRAM(S): 10 TABLET, FILM COATED ORAL at 17:33

## 2019-06-18 RX ADMIN — OXYCODONE HYDROCHLORIDE 10 MILLIGRAM(S): 5 TABLET ORAL at 19:22

## 2019-06-18 RX ADMIN — HEPARIN SODIUM 5000 UNIT(S): 5000 INJECTION INTRAVENOUS; SUBCUTANEOUS at 21:07

## 2019-06-18 RX ADMIN — Medication 1 APPLICATION(S): at 13:39

## 2019-06-18 RX ADMIN — OXYCODONE HYDROCHLORIDE 10 MILLIGRAM(S): 5 TABLET ORAL at 13:56

## 2019-06-18 RX ADMIN — HYDROMORPHONE HYDROCHLORIDE 1 MILLIGRAM(S): 2 INJECTION INTRAMUSCULAR; INTRAVENOUS; SUBCUTANEOUS at 07:45

## 2019-06-18 RX ADMIN — HYDROMORPHONE HYDROCHLORIDE 1 MILLIGRAM(S): 2 INJECTION INTRAMUSCULAR; INTRAVENOUS; SUBCUTANEOUS at 12:30

## 2019-06-18 RX ADMIN — DIPHENHYDRAMINE HYDROCHLORIDE AND LIDOCAINE HYDROCHLORIDE AND ALUMINUM HYDROXIDE AND MAGNESIUM HYDRO 10 MILLILITER(S): KIT at 00:31

## 2019-06-18 RX ADMIN — HEPARIN SODIUM 5000 UNIT(S): 5000 INJECTION INTRAVENOUS; SUBCUTANEOUS at 13:56

## 2019-06-18 RX ADMIN — FAMOTIDINE 20 MILLIGRAM(S): 10 INJECTION INTRAVENOUS at 21:07

## 2019-06-18 RX ADMIN — HYDROMORPHONE HYDROCHLORIDE 1 MILLIGRAM(S): 2 INJECTION INTRAMUSCULAR; INTRAVENOUS; SUBCUTANEOUS at 12:04

## 2019-06-18 RX ADMIN — HEPARIN SODIUM 5000 UNIT(S): 5000 INJECTION INTRAVENOUS; SUBCUTANEOUS at 05:30

## 2019-06-18 RX ADMIN — HYDROMORPHONE HYDROCHLORIDE 1 MILLIGRAM(S): 2 INJECTION INTRAMUSCULAR; INTRAVENOUS; SUBCUTANEOUS at 21:35

## 2019-06-18 RX ADMIN — Medication 20 MILLIGRAM(S): at 13:55

## 2019-06-18 RX ADMIN — DIPHENHYDRAMINE HYDROCHLORIDE AND LIDOCAINE HYDROCHLORIDE AND ALUMINUM HYDROXIDE AND MAGNESIUM HYDRO 10 MILLILITER(S): KIT at 13:55

## 2019-06-18 RX ADMIN — FAMOTIDINE 20 MILLIGRAM(S): 10 INJECTION INTRAVENOUS at 10:00

## 2019-06-18 RX ADMIN — Medication 975 MILLIGRAM(S): at 14:39

## 2019-06-18 RX ADMIN — OXYCODONE HYDROCHLORIDE 10 MILLIGRAM(S): 5 TABLET ORAL at 04:38

## 2019-06-18 RX ADMIN — DIPHENHYDRAMINE HYDROCHLORIDE AND LIDOCAINE HYDROCHLORIDE AND ALUMINUM HYDROXIDE AND MAGNESIUM HYDRO 10 MILLILITER(S): KIT at 19:22

## 2019-06-18 RX ADMIN — LIDOCAINE 2 PATCH: 4 CREAM TOPICAL at 16:03

## 2019-06-18 RX ADMIN — DIPHENHYDRAMINE HYDROCHLORIDE AND LIDOCAINE HYDROCHLORIDE AND ALUMINUM HYDROXIDE AND MAGNESIUM HYDRO 10 MILLILITER(S): KIT at 05:30

## 2019-06-18 RX ADMIN — ATOMOXETINE HYDROCHLORIDE 60 MILLIGRAM(S): 10 CAPSULE ORAL at 13:55

## 2019-06-18 RX ADMIN — SENNA PLUS 2 TABLET(S): 8.6 TABLET ORAL at 21:07

## 2019-06-18 RX ADMIN — Medication 50 MILLIGRAM(S): at 05:31

## 2019-06-18 RX ADMIN — OXYCODONE HYDROCHLORIDE 10 MILLIGRAM(S): 5 TABLET ORAL at 20:00

## 2019-06-18 RX ADMIN — HYDROMORPHONE HYDROCHLORIDE 1 MILLIGRAM(S): 2 INJECTION INTRAMUSCULAR; INTRAVENOUS; SUBCUTANEOUS at 07:20

## 2019-06-18 RX ADMIN — HYDROMORPHONE HYDROCHLORIDE 1 MILLIGRAM(S): 2 INJECTION INTRAMUSCULAR; INTRAVENOUS; SUBCUTANEOUS at 00:33

## 2019-06-18 RX ADMIN — Medication 975 MILLIGRAM(S): at 21:35

## 2019-06-18 RX ADMIN — Medication 975 MILLIGRAM(S): at 21:07

## 2019-06-18 RX ADMIN — HYDROMORPHONE HYDROCHLORIDE 1 MILLIGRAM(S): 2 INJECTION INTRAMUSCULAR; INTRAVENOUS; SUBCUTANEOUS at 21:06

## 2019-06-18 RX ADMIN — OXYCODONE HYDROCHLORIDE 10 MILLIGRAM(S): 5 TABLET ORAL at 14:40

## 2019-06-18 RX ADMIN — HYDROMORPHONE HYDROCHLORIDE 1 MILLIGRAM(S): 2 INJECTION INTRAMUSCULAR; INTRAVENOUS; SUBCUTANEOUS at 01:00

## 2019-06-18 RX ADMIN — Medication 100 MILLIGRAM(S): at 13:55

## 2019-06-18 RX ADMIN — OXYCODONE HYDROCHLORIDE 10 MILLIGRAM(S): 5 TABLET ORAL at 05:10

## 2019-06-18 NOTE — PROGRESS NOTE ADULT - SUBJECTIVE AND OBJECTIVE BOX
Orthopaedic Surgery Progress Note    Post-operative day #8 s/p T6-12 PSF, T9 partial corpectomy, POD#6 s/p C5-T6 PSF w/ T2 partial corpectomy tumor debulking    Subjective:     Patient seen and examined. Patient comfortable, pain controlled. Continues to have increased thirst and urination.  Denies chest pain, shortness of breath, nausea/vomiting, numbness/tingling.    Objective:    Vital Signs Last 24 Hrs  T(C): 36.8 (06-18-19 @ 08:59), Max: 36.8 (06-18-19 @ 08:59)  T(F): 98.2 (06-18-19 @ 08:59), Max: 98.2 (06-18-19 @ 08:59)  HR: 109 (06-18-19 @ 08:59) (109 - 109)  BP: 128/89 (06-18-19 @ 08:59) (128/89 - 128/89)  BP(mean): --  RR: 15 (06-18-19 @ 08:59) (15 - 15)  SpO2: 98% (06-18-19 @ 08:59) (98% - 98%)  AVSS    PE:  General: Patient alert and oriented, NAD  Dressing: Clean/dry/intact thoracic/cervical back  Pulses: 2+ DP pulses bilaterally  Sensation: intact to light touch bilateral upper and lower extremities  Motor: EHL/FHL/TA/GS 5/5 bilateral lower extremities,  strength 5/5 bilateral upper extremities                          10.4   16.48 )-----------( 378      ( 18 Jun 2019 08:59 )             34.0   18 Jun 2019 08:59    136    |  98     |  8      ----------------------------<  119    5.3     |  28     |  0.66     Ca    9.6        18 Jun 2019 08:59        A/P: 59yFemale POD#8 s/p T6-12 PSF, T9 partial corpectomy, POD#6 s/p C5-T6 PSF w/ T2 partial corpectomy tumor debulking  1. Pain control as needed  2. DVT prophylaxis: SCDs  3. PT, weight-bearing status: WBAT  4. Per Lisung, TSH ordered due to increased thirst/urination. Urine lytes ordered. All WNL at this time. Appreciate recs.  5. Dispo: COBY. pending authorization.    Ortho Pager [phone] Orthopaedic Surgery Progress Note    Post-operative day #8 s/p T6-12 PSF, T9 partial corpectomy, POD#6 s/p C5-T6 PSF w/ T2 partial corpectomy tumor debulking    Subjective:     Patient seen and examined. Patient comfortable, pain controlled. Continues to have increased thirst and urination.  Denies chest pain, shortness of breath, nausea/vomiting, numbness/tingling.    Objective:    Vital Signs Last 24 Hrs  T(C): 36.8 (06-18-19 @ 08:59), Max: 36.8 (06-18-19 @ 08:59)  T(F): 98.2 (06-18-19 @ 08:59), Max: 98.2 (06-18-19 @ 08:59)  HR: 109 (06-18-19 @ 08:59) (109 - 109)  BP: 128/89 (06-18-19 @ 08:59) (128/89 - 128/89)  BP(mean): --  RR: 15 (06-18-19 @ 08:59) (15 - 15)  SpO2: 98% (06-18-19 @ 08:59) (98% - 98%)  AVSS    PE:  General: Patient alert and oriented, NAD  Dressing: Clean/dry/intact thoracic/cervical back  Pulses: 2+ DP pulses bilaterally  Sensation: intact to light touch bilateral upper and lower extremities  Motor: EHL/FHL/TA/GS 5/5 bilateral lower extremities,  strength 5/5 bilateral upper extremities                          10.4   16.48 )-----------( 378      ( 18 Jun 2019 08:59 )             34.0   18 Jun 2019 08:59    136    |  98     |  8      ----------------------------<  119    5.3     |  28     |  0.66     Ca    9.6        18 Jun 2019 08:59        A/P: 59yFemale POD#8 s/p T6-12 PSF, T9 partial corpectomy, POD#6 s/p C5-T6 PSF w/ T2 partial corpectomy tumor debulking  1. Pain control as needed  2. DVT prophylaxis: SCDs  3. PT, weight-bearing status: WBAT  4. Per Lisung, TSH ordered due to increased thirst/urination. Urine lytes ordered. All WNL at this time. Appreciate recs.  5. Dispo: COBY pending authorization.  6. multivitamin ordered per Nutrition recs     Ortho Pager [phone]

## 2019-06-18 NOTE — CHART NOTE - NSCHARTNOTEFT_GEN_A_CORE
Upon Nutritional Assessment by the Registered Dietitian your patient was determined to meet criteria / has evidence of the following diagnosis/diagnoses:          [ ]  Mild Protein Calorie Malnutrition        [X ]  Moderate Protein Calorie Malnutrition        [ ] Severe Protein Calorie Malnutrition        [ ] Unspecified Protein Calorie Malnutrition        [ ] Underweight / BMI <19        [ ] Morbid Obesity / BMI > 40    Nutrition Focused Physical Exam: Completed [ X  ]  Not Pertinent [   ]  Muscle Wasting- Temporal [   ]  Clavicle/Pectoral [ X moderate  ]  Shoulder/Deltoid [   ]  Scapula [   ]  Interosseous [   ]  Quadriceps [   ]  Gastrocnemius [   ]  Fat Wasting- Orbital [   ]  Buccal [   ]  Triceps [   ]  Rib [   ]    Suspect moderate malnutrition 2/2 to physical assessment, pt likely meeting <75% EER for >7 days, and 4kg wt loss x9 days (~5% wt loss)    Findings as based on:  •  Comprehensive nutrition assessment and consultation    Treatment:    The following diet has been recommended:  1. Recommend continue regular diet with Ensure Enlive 1x/day (350 kcal, 20 gm protein, 180 mL water) and Ensure puddings BID (340 kcal, 8 gm protein).  2. Monitor labs, trend bi-weekly weights.    PROVIDER Section:     By signing this assessment you are acknowledging and agree with the diagnosis/diagnoses assigned by the Registered Dietitian    Comments: Upon Nutritional Assessment by the Registered Dietitian your patient was determined to meet criteria / has evidence of the following diagnosis/diagnoses:          [ ]  Mild Protein Calorie Malnutrition        [X ]  Moderate Protein Calorie Malnutrition        [ ] Severe Protein Calorie Malnutrition        [ ] Unspecified Protein Calorie Malnutrition        [ ] Underweight / BMI <19        [ ] Morbid Obesity / BMI > 40    Nutrition Focused Physical Exam: Completed [ X  ]  Not Pertinent [   ]  Muscle Wasting- Temporal [   ]  Clavicle/Pectoral [ X moderate  ]  Shoulder/Deltoid [   ]  Scapula [   ]  Interosseous [   ]  Quadriceps [   ]  Gastrocnemius [   ]  Fat Wasting- Orbital [   ]  Buccal [   ]  Triceps [   ]  Rib [   ]    Suspect moderate malnutrition 2/2 to physical assessment, pt likely meeting <75% EER for >7 days, and 4kg wt loss x9 days (~5% wt loss)    Findings as based on:  •  Comprehensive nutrition assessment and consultation    Treatment:    The following diet has been recommended:  1. Recommend continue regular diet with Ensure Enlive 1x/day (350 kcal, 20 gm protein, 180 mL water) and Ensure puddings BID (340 kcal, 8 gm protein). Monitor need to check A1c and need for CSTCHO diet restriction.  2. Monitor labs, trend bi-weekly weights.    PROVIDER Section:     By signing this assessment you are acknowledging and agree with the diagnosis/diagnoses assigned by the Registered Dietitian    Comments:

## 2019-06-18 NOTE — DISCHARGE NOTE PROVIDER - NSDCFUADDAPPT_GEN_ALL_CORE_FT
Follow up with 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 Dr. Matos approved start of chemotherapy for week of 6/24/19.  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 Dr. Matos approved start of chemotherapy for week of 6/24/19.  Bring CD with spine imaging for oncology appointment and medical records from [facility].  Outpatient PET scan and brain MRI recommended by oncology team at [facility]

## 2019-06-18 NOTE — DISCHARGE NOTE PROVIDER - NSDCFUADDINST_GEN_ALL_CORE_FT
No strenuous activity (bending/twisting), heavy lifting, driving or returning to work until cleared by MD.  Change dressing daily with gauze/tape till post-op day #5, then leave incision open to air.  You may shower post-op day#5, keep incision clean and dry.   Try to have regular bowel movements, take stool softener or laxative if necessary.  May take pepcid or zantac for upset stomach.  May apply ice to affected areas to decrease swelling.  Call to schedule an appt with Dr. Matos for follow up, if you have staples or sutures they will be removed in office.  Contact your doctor if you experience: fever greater than 101.5, chills, chest pain, difficulty breathing, redness or excessive drainage around the incision, other concerns.  Follow up with your primary care provider. Wear lumbar support brace and use rolling walker for ambulation.  Use wheelchair for longer distances (to go outside and for medical appointments)  24-hour supervision required.    No strenuous activity (bending/twisting), heavy lifting, driving or returning to work until cleared by MD.  Keep surgical incision clean, dry and intact.  Dr. Matos will remove sutures at your follow up appointment   Try to have regular bowel movements, take stool softener or laxative if necessary.  May take pepcid or zantac for upset stomach.  May apply ice to affected areas to decrease swelling.  Call to schedule an appt with Dr. Matos for follow up, if you have staples or sutures they will be removed in office.  Contact your doctor if you experience: fever greater than 101.5, chills, chest pain, difficulty breathing, redness or excessive drainage around the incision, other concerns.  Follow up with your primary care provider.

## 2019-06-18 NOTE — DISCHARGE NOTE PROVIDER - CARE PROVIDER_API CALL
Philipp Matos)  Orthopaedic Surgery  [address] [place] [address]  Phone: [phone]  Fax: [phone]  Follow Up Time: Philipp Matos)  Orthopaedic Surgery  [address] [place] [address]  Phone: [phone]  Fax: [phone]  Follow Up Time:     Jhonny Sinha)  PhysicalRehab Medicine  [address] [address]  Phone: [phone]  Fax: [phone]  Follow Up Time:     Breana Ramírez)  Internal Medicine; Medical Oncology  [address], [address]  [place] [address]  Phone: [phone]  Fax: [phone]  Follow Up Time:

## 2019-06-18 NOTE — PROGRESS NOTE ADULT - SUBJECTIVE AND OBJECTIVE BOX
CC: Drinking plenty of water/fluids.  Feeling well, pain is overall controlled.  Tolerates PO diet (+); Urination (++); Walked with PT (+)  Denies cp, sob, dizziness, HA, abdominal pain, n/v.  Rest of ROS negative.     Vital Signs Last 24 Hrs  T(C): 36.8 (18 Jun 2019 08:59), Max: 37.6 (17 Jun 2019 20:46)  T(F): 98.2 (18 Jun 2019 08:59), Max: 99.7 (17 Jun 2019 20:46)  HR: 109 (18 Jun 2019 08:59) (105 - 119)  BP: 128/89 (18 Jun 2019 08:59) (100/62 - 131/89)  BP(mean): --  RR: 15 (18 Jun 2019 08:59) (15 - 19)  SpO2: 98% (18 Jun 2019 08:59) (95% - 98%)    PHYSICAL EXAMINATION  * General: Not in acute distress. Awake and alert. Lying comfortably in bed.  * Head: Normocephalic, atraumatic.  * HEENT: ears no discharge, eyes PERRLA, nose no discharge, throat no exudates, normal tonsils.  * Neck: no JVD, supple.  * Lungs: Clear to auscultation, no rales, no wheezes.  * Cardio: Regular rate and rhythm, no murmurs, no rubs, no gallops. Good peripheral pulses.  * Abdomen: Soft, non-tender, non-distended, tympanic to percussion, no rebound, no guarding, no rigidity. Bowel sounds present. No suprapubic or CVA tenderness.  * : Deferred.  * Extremities: Acyanotic, no edema.  * Skin: Warm and dry.  * Neuro: Alert and oriented x 3. No focal deficits. Motor strength is 5/5 throughout. Sensation intact. Cranial nerves II-XII grossly intact.                           10.4   16.48 )-----------( 378      ( 18 Jun 2019 08:59 )             34.0   06-18    136  |  98  |  8   ----------------------------<  119<H>  5.3   |  28  |  0.66    Ca    9.6      18 Jun 2019 08:59    MEDICATIONS  (STANDING):  acetaminophen   Tablet .. 975 milliGRAM(s) Oral every 8 hours  albumin human  5% IVPB 250 milliLiter(s) IV Intermittent once  atoMOXetine 60 milliGRAM(s) Oral daily  BACItracin   Ointment 1 Application(s) Topical daily  BUpivacaine liposome 1.3% Injectable (no eMAR) 20 milliLiter(s) Local Injection once  docusate sodium 100 milliGRAM(s) Oral three times a day  famotidine    Tablet 20 milliGRAM(s) Oral every 12 hours  FIRST- Mouthwash  BLM 10 milliLiter(s) Swish and Spit every 6 hours  FLUoxetine 20 milliGRAM(s) Oral daily  heparin  Injectable 5000 Unit(s) SubCutaneous every 8 hours  lidocaine   Patch 2 Patch Transdermal daily  metoprolol succinate ER 50 milliGRAM(s) Oral daily  polyethylene glycol 3350 17 Gram(s) Oral daily  senna 2 Tablet(s) Oral at bedtime    MEDICATIONS  (PRN):  acetaminophen   Tablet .. 650 milliGRAM(s) Oral every 6 hours PRN Temp greater or equal to 38C (100.4F)  aluminum hydroxide/magnesium hydroxide/simethicone Suspension 30 milliLiter(s) Oral four times a day PRN Indigestion  bisacodyl Suppository 10 milliGRAM(s) Rectal daily PRN If no bowel movement by POD#2  cyclobenzaprine 5 milliGRAM(s) Oral three times a day PRN Muscle Spasm  HYDROmorphone  Injectable 1 milliGRAM(s) IV Push every 2 hours PRN breakthrough pain  magnesium hydroxide Suspension 30 milliLiter(s) Oral every 12 hours PRN Constipation  naloxone Injectable 0.1 milliGRAM(s) IV Push every 3 minutes PRN For ANY of the following changes in patient status:  A. RR LESS THAN 10 breaths per minute, B. Oxygen saturation LESS THAN 90%, C. Sedation score of 6  ondansetron Injectable 4 milliGRAM(s) IV Push every 6 hours PRN Nausea  oxyCODONE    IR 5 milliGRAM(s) Oral every 4 hours PRN Mild Pain (1 - 3)  oxyCODONE    IR 10 milliGRAM(s) Oral every 4 hours PRN Moderate Pain (4 - 6)

## 2019-06-18 NOTE — DISCHARGE NOTE PROVIDER - PROVIDER TOKENS
PROVIDER:[TOKEN:[[id number]]] PROVIDER:[TOKEN:[[id number]]],PROVIDER:[TOKEN:[[id number]]],PROVIDER:[TOKEN:[[id number]]]

## 2019-06-18 NOTE — DISCHARGE NOTE PROVIDER - NSDCCPCAREPLAN_GEN_ALL_CORE_FT
PRINCIPAL DISCHARGE DIAGNOSIS  Diagnosis: Intractable pain  Assessment and Plan of Treatment:       SECONDARY DISCHARGE DIAGNOSES  Diagnosis: Metastatic disease  Assessment and Plan of Treatment:

## 2019-06-18 NOTE — PROGRESS NOTE ADULT - SUBJECTIVE AND OBJECTIVE BOX
Ortho    Procedure: PSF C6-T6 (6/12); Partial corpectomy T10; PSF T7-12 (6/10)   Surgeon: Carlo    Pain controlled this am. Working with PT. Pending rehab   Denies CP, SOB, N/V, numbness/tingling     Vital Signs Last 24 Hrs  T(C): 37.1 (18 Jun 2019 05:15), Max: 37.6 (17 Jun 2019 20:46)  T(F): 98.7 (18 Jun 2019 05:15), Max: 99.7 (17 Jun 2019 20:46)  HR: 112 (18 Jun 2019 05:15) (105 - 119)  BP: 111/68 (18 Jun 2019 05:15) (100/62 - 131/89)  BP(mean): --  RR: 18 (18 Jun 2019 05:15) (18 - 19)  SpO2: 96% (18 Jun 2019 05:15) (95% - 98%)    General: Pt Alert and oriented, NAD  Neck and back DSG C/D/I, dsg c/d/i   	paracervical tenderness L > R   	L D/B 4+, T/WE/WF/ strength 5  	R D/B/T/WE/WF/  strength 5   	SILT C5-T1 bilat   	2+ rad bilat                 A/P: 59yFemale s/p PSF C6-T6 (6/12); Partial corpectomy T10; PSF T7-12 (6/10)   - Stable  - Pain Control  - DVT ppx: SCDs  - PT, WBS: WBAT  - dispo: rehab     Ortho Pager [phone]

## 2019-06-18 NOTE — DISCHARGE NOTE PROVIDER - HOSPITAL COURSE
Admitted to orthopedic service    Surgery    Teresa-op Antibiotics    Pain control - pain management consult     DVT prophylaxis    OOB/Physical Therapy    heme/onc consult Admitted to orthopedic service    Surgery Fusion of 7 to 12 spinal segments by posterior approach; Partial corpectomy of thoracic spine at single level 10-Fareed-2019       Teresa-op Antibiotics    Pain control - pain management consult     DVT prophylaxis    OOB/Physical Therapy    heme/onc consult Admitted to orthopedic service- thoracic tumor    Surgery Fusion of 7 to 12 spinal segments by posterior approach; Partial corpectomy of thoracic spine at single level 10-Fareed-2019 and June 12, 2019    Teresa-op Antibiotics    Pain control - pain management consult     DVT prophylaxis    OOB/Physical Therapy    heme/onc consult     neurology consult Admitted to orthopedic service- thoracic spine tumor    Surgery Fusion of 7 to 12 spinal segments by posterior approach; Partial corpectomy of thoracic spine at single level 10-Fareed-2019 and June 12, 2019    Teresa-op Antibiotics    Pain control - pain management consult     DVT prophylaxis    OOB/Physical Therapy    heme/onc consult     neurology consult Admitted to orthopedic service- thoracic spine tumor    Surgery Fusion of 7 to 12 spinal segments by posterior approach; Partial corpectomy of thoracic spine at single level 10-Fareed-2019 and June 12, 2019    Teresa-op Antibiotics    Pain control - pain management consult     DVT prophylaxis    OOB/Physical Therapy    heme/onc consult - unspecified malignancy    neurology consult

## 2019-06-18 NOTE — DISCHARGE NOTE PROVIDER - NSDCCPTREATMENT_GEN_ALL_CORE_FT
PRINCIPAL PROCEDURE  Procedure: Fusion of 7 to 12 spinal segments by posterior approach  Findings and Treatment:       SECONDARY PROCEDURE  Procedure: Fusion of 7 to 12 spinal segments by posterior approach  Findings and Treatment:

## 2019-06-18 NOTE — CHART NOTE - NSCHARTNOTEFT_GEN_A_CORE
Admitting Diagnosis:   Patient is a 59y old  Female who presents with a chief complaint of back pain (18 Jun 2019 12:35)    PAST MEDICAL & SURGICAL HISTORY:  No pertinent past medical history  History of small bowel obstruction    Current Nutrition Order:  Regular diet with Ensure pudding BID (340 kcal, 8 gm protein)    PO Intake: pt consuming <75% of meals    GI Issues: WDL, last BM 6/18, pt denies N/V    Pain: Pain controlled at this time    Skin Integrity: Bin score 20 per flow sheet    Labs:   06-18    136  |  98  |  8   ----------------------------<  119<H>  5.3   |  28  |  0.66    Ca    9.6      18 Jun 2019 08:59    CAPILLARY BLOOD GLUCOSE    Medications:  MEDICATIONS  (STANDING):  acetaminophen   Tablet .. 975 milliGRAM(s) Oral every 8 hours  albumin human  5% IVPB 250 milliLiter(s) IV Intermittent once  atoMOXetine 60 milliGRAM(s) Oral daily  BACItracin   Ointment 1 Application(s) Topical daily  BUpivacaine liposome 1.3% Injectable (no eMAR) 20 milliLiter(s) Local Injection once  docusate sodium 100 milliGRAM(s) Oral three times a day  famotidine    Tablet 20 milliGRAM(s) Oral every 12 hours  FIRST- Mouthwash  BLM 10 milliLiter(s) Swish and Spit every 6 hours  FLUoxetine 20 milliGRAM(s) Oral daily  heparin  Injectable 5000 Unit(s) SubCutaneous every 8 hours  lidocaine   Patch 2 Patch Transdermal daily  metoprolol succinate ER 50 milliGRAM(s) Oral daily  multivitamin/minerals 1 Tablet(s) Oral daily  polyethylene glycol 3350 17 Gram(s) Oral daily  senna 2 Tablet(s) Oral at bedtime    MEDICATIONS  (PRN):  acetaminophen   Tablet .. 650 milliGRAM(s) Oral every 6 hours PRN Temp greater or equal to 38C (100.4F)  aluminum hydroxide/magnesium hydroxide/simethicone Suspension 30 milliLiter(s) Oral four times a day PRN Indigestion  bisacodyl Suppository 10 milliGRAM(s) Rectal daily PRN If no bowel movement by POD#2  cyclobenzaprine 5 milliGRAM(s) Oral three times a day PRN Muscle Spasm  HYDROmorphone  Injectable 1 milliGRAM(s) IV Push every 2 hours PRN breakthrough pain  magnesium hydroxide Suspension 30 milliLiter(s) Oral every 12 hours PRN Constipation  naloxone Injectable 0.1 milliGRAM(s) IV Push every 3 minutes PRN For ANY of the following changes in patient status:  A. RR LESS THAN 10 breaths per minute, B. Oxygen saturation LESS THAN 90%, C. Sedation score of 6  ondansetron Injectable 4 milliGRAM(s) IV Push every 6 hours PRN Nausea  oxyCODONE    IR 5 milliGRAM(s) Oral every 4 hours PRN Mild Pain (1 - 3)  oxyCODONE    IR 10 milliGRAM(s) Oral every 4 hours PRN Moderate Pain (4 - 6)    Weight:  73.2kg (6/18 bed scale)    Weight Change: Per weights, pt with 4kg wt loss since admission    Nutrition Focused Physical Exam: Completed [ X  ]  Not Pertinent [   ]  Muscle Wasting- Temporal [   ]  Clavicle/Pectoral [ X moderate  ]  Shoulder/Deltoid [   ]  Scapula [   ]  Interosseous [   ]  Quadriceps [   ]  Gastrocnemius [   ]  Fat Wasting- Orbital [   ]  Buccal [   ]  Triceps [   ]  Rib [   ]  Suspect moderate malnutrition 2/2 to physical assessment, pt likely meeting <75% EER for >7 days, and 4kg wt loss x9 days (~5% wt loss); please see malnutrition chart note.    Estimated energy needs:   Ht (6/14 per pt): 170.18cm, Wt (6/12): 77.1kg, IBW: 61.4kg +/-10%, %IBW: 126%, BMI: 26.6   IBW used to calculate energy needs due to pt's current body weight exceeding 120% of IBW. Needs adjusted s/p surgery, hypermetabolic state, overweight status.  	  3528-3152 kcal (25-30 kcal/kg), 86-98 gm (1.4-1.6 gm/kg), fluid needs per team for possible fluid overload    Subjective: 60 yo F, PMH of SBO, R LISA, newly diagnosed metastatic cancer with lytic lesions now s/p PSF C6-T6 (6/12); Partial corpectomy T10; PSF T7-12 (6/10). Pt reports magic mouthwash has been helping her dry mouth. Also states she has been trying to drink water before eating more solid food, despite improvements with magic mouthwash, pt states her mouth is still dry and she is forcing herself to eat solids. Pt has been drinking Ensure, eating Ensure pudding as well as yogurt, eggs, etc. Discussed with team regarding pt's dry mouth and difficulty tolerating solids- TSH ordered due to increased thirst/urination per team. Please see full nutritional recs below- d/w team. RD to monitor and f/u per high risk protocol.    Previous Nutrition Diagnosis:  Inadequate energy intake RT inability to tolerate solids AEB pt reports only eating/drinking fluids, soups, yogurt x3 weeks    Active [ X  ]  Resolved [   ]    PES: Malnutrition (suspected moderate) RT suboptimal nutrition AEB pt likely meeting <75% EER for >7 days, and 4kg wt loss x9 days (~5% wt loss)    Goal: Pt to consume >/=75% EER with good tolerance and not further s/s malnutrition.    Recommendations:  1. Recommend continue regular diet with Ensure Enlive 1x/day (350 kcal, 20 gm protein, 180 mL water) and Ensure puddings BID (340 kcal, 8 gm protein).  2. Monitor labs, trend bi-weekly weights.    Education: Encouraged increased PO intake with emphasis lean protein, ONS, encouraged decreased intake of fruit juices and fluids in general    Risk Level: High [ X  ] Moderate [   ] Low [   ] Admitting Diagnosis:   Patient is a 59y old  Female who presents with a chief complaint of back pain (18 Jun 2019 12:35)    PAST MEDICAL & SURGICAL HISTORY:  No pertinent past medical history  History of small bowel obstruction    Current Nutrition Order:  Regular diet with Ensure pudding BID (340 kcal, 8 gm protein)    PO Intake: pt consuming <75% of meals    GI Issues: WDL, last BM 6/18, pt denies N/V    Pain: Pain controlled at this time    Skin Integrity: Bin score 20 per flow sheet    Labs:   06-18    136  |  98  |  8   ----------------------------<  119<H>  5.3   |  28  |  0.66    Ca    9.6      18 Jun 2019 08:59    CAPILLARY BLOOD GLUCOSE    Medications:  MEDICATIONS  (STANDING):  acetaminophen   Tablet .. 975 milliGRAM(s) Oral every 8 hours  albumin human  5% IVPB 250 milliLiter(s) IV Intermittent once  atoMOXetine 60 milliGRAM(s) Oral daily  BACItracin   Ointment 1 Application(s) Topical daily  BUpivacaine liposome 1.3% Injectable (no eMAR) 20 milliLiter(s) Local Injection once  docusate sodium 100 milliGRAM(s) Oral three times a day  famotidine    Tablet 20 milliGRAM(s) Oral every 12 hours  FIRST- Mouthwash  BLM 10 milliLiter(s) Swish and Spit every 6 hours  FLUoxetine 20 milliGRAM(s) Oral daily  heparin  Injectable 5000 Unit(s) SubCutaneous every 8 hours  lidocaine   Patch 2 Patch Transdermal daily  metoprolol succinate ER 50 milliGRAM(s) Oral daily  multivitamin/minerals 1 Tablet(s) Oral daily  polyethylene glycol 3350 17 Gram(s) Oral daily  senna 2 Tablet(s) Oral at bedtime    MEDICATIONS  (PRN):  acetaminophen   Tablet .. 650 milliGRAM(s) Oral every 6 hours PRN Temp greater or equal to 38C (100.4F)  aluminum hydroxide/magnesium hydroxide/simethicone Suspension 30 milliLiter(s) Oral four times a day PRN Indigestion  bisacodyl Suppository 10 milliGRAM(s) Rectal daily PRN If no bowel movement by POD#2  cyclobenzaprine 5 milliGRAM(s) Oral three times a day PRN Muscle Spasm  HYDROmorphone  Injectable 1 milliGRAM(s) IV Push every 2 hours PRN breakthrough pain  magnesium hydroxide Suspension 30 milliLiter(s) Oral every 12 hours PRN Constipation  naloxone Injectable 0.1 milliGRAM(s) IV Push every 3 minutes PRN For ANY of the following changes in patient status:  A. RR LESS THAN 10 breaths per minute, B. Oxygen saturation LESS THAN 90%, C. Sedation score of 6  ondansetron Injectable 4 milliGRAM(s) IV Push every 6 hours PRN Nausea  oxyCODONE    IR 5 milliGRAM(s) Oral every 4 hours PRN Mild Pain (1 - 3)  oxyCODONE    IR 10 milliGRAM(s) Oral every 4 hours PRN Moderate Pain (4 - 6)    Weight:  73.2kg (6/18 bed scale)    Weight Change: Per weights, pt with 4kg wt loss since admission    Nutrition Focused Physical Exam: Completed [ X  ]  Not Pertinent [   ]  Muscle Wasting- Temporal [   ]  Clavicle/Pectoral [ X moderate  ]  Shoulder/Deltoid [   ]  Scapula [   ]  Interosseous [   ]  Quadriceps [   ]  Gastrocnemius [   ]  Fat Wasting- Orbital [   ]  Buccal [   ]  Triceps [   ]  Rib [   ]  Suspect moderate malnutrition 2/2 to physical assessment, pt likely meeting <75% EER for >7 days, and 4kg wt loss x9 days (~5% wt loss); please see malnutrition chart note.    Estimated energy needs:   Ht (6/14 per pt): 170.18cm, Wt (6/12): 77.1kg, IBW: 61.4kg +/-10%, %IBW: 126%, BMI: 26.6   IBW used to calculate energy needs due to pt's current body weight exceeding 120% of IBW. Needs adjusted s/p surgery, hypermetabolic state, overweight status.  	  9686-0932 kcal (25-30 kcal/kg), 86-98 gm (1.4-1.6 gm/kg), fluid needs per team for possible fluid overload    Subjective: 60 yo F, PMH of SBO, R LISA, newly diagnosed metastatic cancer with lytic lesions now s/p PSF C6-T6 (6/12); Partial corpectomy T10; PSF T7-12 (6/10). Pt reports magic mouthwash has been helping her dry mouth. Also states she has been trying to drink water before eating more solid food, despite improvements with magic mouthwash, pt states her mouth is still dry and she is forcing herself to eat solids. Pt has been drinking Ensure, eating Ensure pudding as well as yogurt, eggs, etc. Discussed with team regarding pt's dry mouth and difficulty tolerating solids- TSH ordered due to increased thirst/urination per team. Hyperglycemia- pt reports drinking fruit juices, monitor need to check A1c and need for CSTCHO diet restriction. Please see full nutritional recs below- d/w team. RD to monitor and f/u per high risk protocol.    Previous Nutrition Diagnosis:  Inadequate energy intake RT inability to tolerate solids AEB pt reports only eating/drinking fluids, soups, yogurt x3 weeks    Active [ X  ]  Resolved [   ]    PES: Malnutrition (suspected moderate) RT suboptimal nutrition AEB pt likely meeting <75% EER for >7 days, and 4kg wt loss x9 days (~5% wt loss)    Goal: Pt to consume >/=75% EER with good tolerance and not further s/s malnutrition.    Recommendations:  1. Recommend continue regular diet with Ensure Enlive 1x/day (350 kcal, 20 gm protein, 180 mL water) and Ensure puddings BID (340 kcal, 8 gm protein). Monitor need to check A1c and need for CSTCHO diet restriction.  2. Monitor labs, trend bi-weekly weights.    Education: Encouraged increased PO intake with emphasis lean protein, ONS, encouraged decreased intake of fruit juices and fluids in general    Risk Level: High [ X  ] Moderate [   ] Low [   ]

## 2019-06-18 NOTE — PROGRESS NOTE ADULT - ASSESSMENT
60yo woman with a PMH of SBO, R LISA and newly diagnosed, unspecified metastatic cancer with lytic bone lesions who presented for spine surgery in the setting of lytic lesions and epidural invasion at T10.  Now s/p PSF C6-T6 (6/12), partial corpectomy at T10 and  PSF at T7-12 (6/10).  Post-op course c/b pain and tachycardia.     1) Sinus tachycardia, likely 2/2 deconditioning and pain.   * Needs Incentive spirometry  * Pain control -pain management  * Now on Toprol XL to 50mg daily, starting today 6/18 in am.  * Checked TSH and is wnl.    2) HTN,  * c/w Toprol XL to 50mg daily, starting today 6/18 in am.    3) Metastatic spinal disease  s/p debulking sx.    4) Post-op state  * OOB-C  * Physical therapy evaluation  * Aggressive incentive spirometry  * Pain control and bowel regimen  * Mechanical LE ppx   * DC telemetry    5) VTE ppx.   *  SCDs and ambulation as possible

## 2019-06-18 NOTE — PROGRESS NOTE ADULT - SUBJECTIVE AND OBJECTIVE BOX
Pain Management Progress Note - [facility] [phone]      HPI: Patient seen and examined today, patient in nad. Patient with x2 month of thoracic spine pain, found to have metastatic disease s/p  tumor debulking at T9-T10 and T2, now s/p PSF C6-T6.   Patient reports neck, thoracic spine and surgical site pain, pain managed with current pain medication regimen.  Patient Axox3, denies n,v, no s/s of oversedation.       Pain is ___ sharp _x___dull ___burning _x__achy ___ Intensity: ____ mild _x__mod _x__severe     Location __x__surgical site ____cervical _____lumbar ____abd ____upper ext____lower ext  __x_ thoracic pain    Worse with __x__activity _x___movement _____physical therapy___ Rest    Improved with _x___medication _x___rest ____physical therapy      sodium chloride 0.9% Bolus  HYDROmorphone  Injectable  oxyCODONE    IR  oxyCODONE  ER Tablet  morphine  - Injectable  HYDROmorphone  Injectable  famotidine    Tablet  magnesium hydroxide Suspension  bisacodyl Suppository  docusate sodium  zaleplon  lactated ringers.  FLUoxetine  metoprolol succinate ER  atoMOXetine  morphine  - Injectable  HYDROmorphone PCA (1 mG/mL)  HYDROmorphone PCA (1 mG/mL) Rescue Clinician Bolus  naloxone Injectable  ondansetron Injectable  cyclobenzaprine  BUpivacaine liposome 1.3% Injectable (no eMAR)  lactated ringers.  acetaminophen   Tablet ..  ceFAZolin   IVPB  docusate sodium  magnesium hydroxide Suspension  senna  gentamicin   IVPB  ceFAZolin  Injectable.  albumin human  5% IVPB  ceFAZolin  Injectable.  lactated ringers.  oxyCODONE    IR  HYDROmorphone  Injectable  lidocaine   Patch  BACItracin   Ointment  lactated ringers.  acetaminophen   Tablet ..  oxyCODONE    IR  oxyCODONE    IR  morphine  - Injectable  HYDROmorphone  Injectable  aluminum hydroxide/magnesium hydroxide/simethicone Suspension  ondansetron Injectable  polyethylene glycol 3350  bisacodyl Suppository  senna  docusate sodium  metoprolol succinate ER  zaleplon  FLUoxetine  famotidine    Tablet  cyclobenzaprine  dexmedetomidine Infusion  dexmedetomidine Infusion  gentamicin   IVPB  ceFAZolin  Injectable.  albumin human  5% IVPB  albumin human  5% IVPB  fentaNYL   Infusion  calcium gluconate IVPB  fentaNYL   Infusion  lactated ringers Bolus  morphine  - Injectable  acetaminophen   Tablet ..  heparin  Injectable  metoprolol succinate ER  metoprolol tartrate  HYDROmorphone  Injectable        ROS: Const:  ___febrile   Eyes:___ENT:___CV: _-__chest pain  Resp: __-__sob  GI:_-__nausea _-__vomiting _-__abd pain ___npo ___clears _x_full diet __bm  :___ Musk: _x__pain _-__spasm  Skin:___ Neuro:  _-__lzerpwiv_-__wljdtymho_-__ numbness _-_weakness __x_paresth  Psych:__anxiety  Endo:___ Heme:___Allergy:_________, _x__all others reviewed and negative      PAST MEDICAL & SURGICAL HISTORY:  No pertinent past medical history  History of small bowel obstruction      06-18 @ 08:5996 mL/min/1.73M2        Hemoglobin: 10.4 g/dL (06-18 @ 08:59)  Hemoglobin: 10.3 g/dL (06-17 @ 08:55)        T(C): 36.8 (06-18-19 @ 08:59), Max: 37.6 (06-17-19 @ 20:46)  HR: 109 (06-18-19 @ 08:59) (105 - 119)  BP: 128/89 (06-18-19 @ 08:59) (100/62 - 131/89)  RR: 15 (06-18-19 @ 08:59) (15 - 19)  SpO2: 98% (06-18-19 @ 08:59) (95% - 98%)  Wt(kg): --        PHYSICAL EXAM:  Gen Appearance: __x_no acute distress _x__appropriate        Neuro: _x__SILT feet____ EOM Intact Psych: AAOX_3_, _x__mood/affect appropriate        Eyes: __x_conjunctiva WNL  ___x__ Pupils equal and round        ENT: x___ears and nose atraumatic_x__ Hearing grossly intact        Neck: __x_trachea midline, no visible masses ___thyroid without palpable mass    Resp: x___Nml WOB____No tactile fremitus ___clear to auscultation    Cardio: _x__extremities free from edema __x__pedal pulses palpable    GI/Abdomen: __x_soft __x___ Nontender___x___Nondistended_____HSM    Lymphatic: ___no palpable nodes in neck  _x_no palpable nodes calves and feet    Skin/Wound: ___Incision, _x__Dressing c/d/i,   ____surrounding tissues soft,  __x_drain/chest tube present____    Muscular: EHL _5__/5  Gastrocnemius_5__/5    ___absent clubbing/cyanosis        ASSESSMENT: This is a 59y old Female with a history of intractable pain and metastatic disease, scheduled for tumor debulking at T9-T10 and T2, patient now s/p C6-T6, pain managed with current pain medication regimen.      Recommended Treatment PLAN:  1. Oxycodone 5-10mg PO Q4 prn moderate to severe pain  2. Dilaudid 1mg Q2h IVP prn breakthrough pain  3. Flexeril 5mg Po Q8h prn muscle spasms  4. x2 lidocaine patch to affected area, 12hours on 12hours off  Plan discussed with Dr. Bravo

## 2019-06-19 LAB
ANION GAP SERPL CALC-SCNC: 13 MMOL/L — SIGNIFICANT CHANGE UP (ref 5–17)
BUN SERPL-MCNC: 8 MG/DL — SIGNIFICANT CHANGE UP (ref 7–23)
CALCIUM SERPL-MCNC: 9 MG/DL — SIGNIFICANT CHANGE UP (ref 8.4–10.5)
CHLORIDE SERPL-SCNC: 97 MMOL/L — SIGNIFICANT CHANGE UP (ref 96–108)
CO2 SERPL-SCNC: 22 MMOL/L — SIGNIFICANT CHANGE UP (ref 22–31)
CREAT SERPL-MCNC: 0.61 MG/DL — SIGNIFICANT CHANGE UP (ref 0.5–1.3)
GLUCOSE SERPL-MCNC: 120 MG/DL — HIGH (ref 70–99)
HCT VFR BLD CALC: 33.8 % — LOW (ref 34.5–45)
HGB BLD-MCNC: 10.4 G/DL — LOW (ref 11.5–15.5)
MCHC RBC-ENTMCNC: 30.1 PG — SIGNIFICANT CHANGE UP (ref 27–34)
MCHC RBC-ENTMCNC: 30.8 GM/DL — LOW (ref 32–36)
MCV RBC AUTO: 97.7 FL — SIGNIFICANT CHANGE UP (ref 80–100)
NRBC # BLD: 0 /100 WBCS — SIGNIFICANT CHANGE UP (ref 0–0)
PLATELET # BLD AUTO: 428 K/UL — HIGH (ref 150–400)
POTASSIUM SERPL-MCNC: 4.4 MMOL/L — SIGNIFICANT CHANGE UP (ref 3.5–5.3)
POTASSIUM SERPL-SCNC: 4.4 MMOL/L — SIGNIFICANT CHANGE UP (ref 3.5–5.3)
RBC # BLD: 3.46 M/UL — LOW (ref 3.8–5.2)
RBC # FLD: 17.5 % — HIGH (ref 10.3–14.5)
SODIUM SERPL-SCNC: 132 MMOL/L — LOW (ref 135–145)
SURGICAL PATHOLOGY STUDY: SIGNIFICANT CHANGE UP
WBC # BLD: 16.14 K/UL — HIGH (ref 3.8–10.5)
WBC # FLD AUTO: 16.14 K/UL — HIGH (ref 3.8–10.5)

## 2019-06-19 PROCEDURE — 99233 SBSQ HOSP IP/OBS HIGH 50: CPT

## 2019-06-19 RX ORDER — OXYCODONE HYDROCHLORIDE 5 MG/1
10 TABLET ORAL EVERY 4 HOURS
Refills: 0 | Status: DISCONTINUED | OUTPATIENT
Start: 2019-06-20 | End: 2019-06-21

## 2019-06-19 RX ORDER — CYCLOBENZAPRINE HYDROCHLORIDE 10 MG/1
10 TABLET, FILM COATED ORAL THREE TIMES A DAY
Refills: 0 | Status: DISCONTINUED | OUTPATIENT
Start: 2019-06-19 | End: 2019-06-21

## 2019-06-19 RX ORDER — OXYCODONE HYDROCHLORIDE 5 MG/1
5 TABLET ORAL EVERY 4 HOURS
Refills: 0 | Status: DISCONTINUED | OUTPATIENT
Start: 2019-06-20 | End: 2019-06-21

## 2019-06-19 RX ADMIN — OXYCODONE HYDROCHLORIDE 10 MILLIGRAM(S): 5 TABLET ORAL at 03:15

## 2019-06-19 RX ADMIN — LIDOCAINE 2 PATCH: 4 CREAM TOPICAL at 20:39

## 2019-06-19 RX ADMIN — OXYCODONE HYDROCHLORIDE 10 MILLIGRAM(S): 5 TABLET ORAL at 10:42

## 2019-06-19 RX ADMIN — HYDROMORPHONE HYDROCHLORIDE 1 MILLIGRAM(S): 2 INJECTION INTRAMUSCULAR; INTRAVENOUS; SUBCUTANEOUS at 04:30

## 2019-06-19 RX ADMIN — ATOMOXETINE HYDROCHLORIDE 60 MILLIGRAM(S): 10 CAPSULE ORAL at 12:33

## 2019-06-19 RX ADMIN — HYDROMORPHONE HYDROCHLORIDE 1 MILLIGRAM(S): 2 INJECTION INTRAMUSCULAR; INTRAVENOUS; SUBCUTANEOUS at 10:43

## 2019-06-19 RX ADMIN — Medication 975 MILLIGRAM(S): at 13:26

## 2019-06-19 RX ADMIN — Medication 1 TABLET(S): at 11:09

## 2019-06-19 RX ADMIN — Medication 50 MILLIGRAM(S): at 05:18

## 2019-06-19 RX ADMIN — POLYETHYLENE GLYCOL 3350 17 GRAM(S): 17 POWDER, FOR SOLUTION ORAL at 09:50

## 2019-06-19 RX ADMIN — LIDOCAINE 2 PATCH: 4 CREAM TOPICAL at 10:35

## 2019-06-19 RX ADMIN — LIDOCAINE 2 PATCH: 4 CREAM TOPICAL at 22:00

## 2019-06-19 RX ADMIN — Medication 975 MILLIGRAM(S): at 20:55

## 2019-06-19 RX ADMIN — Medication 975 MILLIGRAM(S): at 05:18

## 2019-06-19 RX ADMIN — Medication 100 MILLIGRAM(S): at 05:18

## 2019-06-19 RX ADMIN — HYDROMORPHONE HYDROCHLORIDE 1 MILLIGRAM(S): 2 INJECTION INTRAMUSCULAR; INTRAVENOUS; SUBCUTANEOUS at 11:00

## 2019-06-19 RX ADMIN — OXYCODONE HYDROCHLORIDE 10 MILLIGRAM(S): 5 TABLET ORAL at 15:51

## 2019-06-19 RX ADMIN — Medication 975 MILLIGRAM(S): at 12:36

## 2019-06-19 RX ADMIN — OXYCODONE HYDROCHLORIDE 10 MILLIGRAM(S): 5 TABLET ORAL at 07:15

## 2019-06-19 RX ADMIN — HEPARIN SODIUM 5000 UNIT(S): 5000 INJECTION INTRAVENOUS; SUBCUTANEOUS at 20:55

## 2019-06-19 RX ADMIN — OXYCODONE HYDROCHLORIDE 10 MILLIGRAM(S): 5 TABLET ORAL at 21:30

## 2019-06-19 RX ADMIN — OXYCODONE HYDROCHLORIDE 10 MILLIGRAM(S): 5 TABLET ORAL at 02:39

## 2019-06-19 RX ADMIN — FAMOTIDINE 20 MILLIGRAM(S): 10 INJECTION INTRAVENOUS at 20:54

## 2019-06-19 RX ADMIN — OXYCODONE HYDROCHLORIDE 10 MILLIGRAM(S): 5 TABLET ORAL at 20:55

## 2019-06-19 RX ADMIN — CYCLOBENZAPRINE HYDROCHLORIDE 10 MILLIGRAM(S): 10 TABLET, FILM COATED ORAL at 22:03

## 2019-06-19 RX ADMIN — CYCLOBENZAPRINE HYDROCHLORIDE 5 MILLIGRAM(S): 10 TABLET, FILM COATED ORAL at 10:34

## 2019-06-19 RX ADMIN — SENNA PLUS 2 TABLET(S): 8.6 TABLET ORAL at 20:54

## 2019-06-19 RX ADMIN — HYDROMORPHONE HYDROCHLORIDE 1 MILLIGRAM(S): 2 INJECTION INTRAMUSCULAR; INTRAVENOUS; SUBCUTANEOUS at 22:03

## 2019-06-19 RX ADMIN — Medication 975 MILLIGRAM(S): at 05:19

## 2019-06-19 RX ADMIN — DIPHENHYDRAMINE HYDROCHLORIDE AND LIDOCAINE HYDROCHLORIDE AND ALUMINUM HYDROXIDE AND MAGNESIUM HYDRO 10 MILLILITER(S): KIT at 23:43

## 2019-06-19 RX ADMIN — HYDROMORPHONE HYDROCHLORIDE 1 MILLIGRAM(S): 2 INJECTION INTRAMUSCULAR; INTRAVENOUS; SUBCUTANEOUS at 03:56

## 2019-06-19 RX ADMIN — HEPARIN SODIUM 5000 UNIT(S): 5000 INJECTION INTRAVENOUS; SUBCUTANEOUS at 12:37

## 2019-06-19 RX ADMIN — DIPHENHYDRAMINE HYDROCHLORIDE AND LIDOCAINE HYDROCHLORIDE AND ALUMINUM HYDROXIDE AND MAGNESIUM HYDRO 10 MILLILITER(S): KIT at 18:05

## 2019-06-19 RX ADMIN — DIPHENHYDRAMINE HYDROCHLORIDE AND LIDOCAINE HYDROCHLORIDE AND ALUMINUM HYDROXIDE AND MAGNESIUM HYDRO 10 MILLILITER(S): KIT at 05:19

## 2019-06-19 RX ADMIN — Medication 1 APPLICATION(S): at 18:09

## 2019-06-19 RX ADMIN — HEPARIN SODIUM 5000 UNIT(S): 5000 INJECTION INTRAVENOUS; SUBCUTANEOUS at 05:18

## 2019-06-19 RX ADMIN — Medication 100 MILLIGRAM(S): at 12:34

## 2019-06-19 RX ADMIN — FAMOTIDINE 20 MILLIGRAM(S): 10 INJECTION INTRAVENOUS at 09:50

## 2019-06-19 RX ADMIN — Medication 975 MILLIGRAM(S): at 21:30

## 2019-06-19 RX ADMIN — OXYCODONE HYDROCHLORIDE 10 MILLIGRAM(S): 5 TABLET ORAL at 16:51

## 2019-06-19 RX ADMIN — OXYCODONE HYDROCHLORIDE 10 MILLIGRAM(S): 5 TABLET ORAL at 09:48

## 2019-06-19 RX ADMIN — Medication 100 MILLIGRAM(S): at 20:55

## 2019-06-19 RX ADMIN — HYDROMORPHONE HYDROCHLORIDE 1 MILLIGRAM(S): 2 INJECTION INTRAMUSCULAR; INTRAVENOUS; SUBCUTANEOUS at 22:30

## 2019-06-19 RX ADMIN — DIPHENHYDRAMINE HYDROCHLORIDE AND LIDOCAINE HYDROCHLORIDE AND ALUMINUM HYDROXIDE AND MAGNESIUM HYDRO 10 MILLILITER(S): KIT at 12:35

## 2019-06-19 RX ADMIN — OXYCODONE HYDROCHLORIDE 10 MILLIGRAM(S): 5 TABLET ORAL at 06:48

## 2019-06-19 RX ADMIN — Medication 20 MILLIGRAM(S): at 11:08

## 2019-06-19 NOTE — PROGRESS NOTE ADULT - SUBJECTIVE AND OBJECTIVE BOX
CC: Drinking plenty of water/fluids.  Feeling well, pain is overall controlled.  Tolerates PO diet (+); Urination (++); Walked with PT (+)  Denies cp, sob, dizziness, HA, abdominal pain, n/v.  Rest of ROS negative.     Vital Signs Last 24 Hrs  T(C): 36.8 (19 Jun 2019 08:51), Max: 37 (18 Jun 2019 21:03)  T(F): 98.2 (19 Jun 2019 08:51), Max: 98.6 (18 Jun 2019 21:03)  HR: 93 (19 Jun 2019 08:51) (93 - 124)  BP: 111/83 (19 Jun 2019 08:51) (111/83 - 122/79)  BP(mean): --  RR: 20 (19 Jun 2019 08:51) (18 - 20)  SpO2: 95% (19 Jun 2019 08:51) (92% - 95%)    PHYSICAL EXAMINATION  * General: Not in acute distress. Awake and alert. Lying comfortably in bed.  * Head: Normocephalic, atraumatic.  * HEENT: ears no discharge, eyes PERRLA, nose no discharge, throat no exudates, normal tonsils.  * Neck: no JVD, supple.  * Lungs: Clear to auscultation, no rales, no wheezes.  * Cardio: Regular rate and rhythm, no murmurs, no rubs, no gallops. Good peripheral pulses.  * Abdomen: Soft, non-tender, non-distended, tympanic to percussion, no rebound, no guarding, no rigidity. Bowel sounds present. No suprapubic or CVA tenderness.  * : Deferred.  * Extremities: Acyanotic, no edema.  * Skin: Warm and dry.  * Neuro: Alert and oriented x 3. No focal deficits. Motor strength is 5/5 throughout. Sensation intact. Cranial nerves II-XII grossly intact.                           10.4   16.14 )-----------( 428      ( 19 Jun 2019 06:40 )             33.8       06-19    132<L>  |  97  |  8   ----------------------------<  120<H>  4.4   |  22  |  0.61    Ca    9.0      19 Jun 2019 06:40    MEDICATIONS  (STANDING):  acetaminophen   Tablet .. 975 milliGRAM(s) Oral every 8 hours  albumin human  5% IVPB 250 milliLiter(s) IV Intermittent once  atoMOXetine 60 milliGRAM(s) Oral daily  BACItracin   Ointment 1 Application(s) Topical daily  BUpivacaine liposome 1.3% Injectable (no eMAR) 20 milliLiter(s) Local Injection once  docusate sodium 100 milliGRAM(s) Oral three times a day  famotidine    Tablet 20 milliGRAM(s) Oral every 12 hours  FIRST- Mouthwash  BLM 10 milliLiter(s) Swish and Spit every 6 hours  FLUoxetine 20 milliGRAM(s) Oral daily  heparin  Injectable 5000 Unit(s) SubCutaneous every 8 hours  lidocaine   Patch 2 Patch Transdermal daily  metoprolol succinate ER 50 milliGRAM(s) Oral daily  multivitamin/minerals 1 Tablet(s) Oral daily  polyethylene glycol 3350 17 Gram(s) Oral daily  senna 2 Tablet(s) Oral at bedtime    MEDICATIONS  (PRN):  acetaminophen   Tablet .. 650 milliGRAM(s) Oral every 6 hours PRN Temp greater or equal to 38C (100.4F)  aluminum hydroxide/magnesium hydroxide/simethicone Suspension 30 milliLiter(s) Oral four times a day PRN Indigestion  bisacodyl Suppository 10 milliGRAM(s) Rectal daily PRN If no bowel movement by POD#2  cyclobenzaprine 10 milliGRAM(s) Oral three times a day PRN Muscle Spasm  HYDROmorphone  Injectable 1 milliGRAM(s) IV Push every 2 hours PRN breakthrough pain  magnesium hydroxide Suspension 30 milliLiter(s) Oral every 12 hours PRN Constipation  naloxone Injectable 0.1 milliGRAM(s) IV Push every 3 minutes PRN For ANY of the following changes in patient status:  A. RR LESS THAN 10 breaths per minute, B. Oxygen saturation LESS THAN 90%, C. Sedation score of 6  ondansetron Injectable 4 milliGRAM(s) IV Push every 6 hours PRN Nausea  oxyCODONE    IR 5 milliGRAM(s) Oral every 4 hours PRN Mild Pain (1 - 3)  oxyCODONE    IR 10 milliGRAM(s) Oral every 4 hours PRN Moderate Pain (4 - 6)

## 2019-06-19 NOTE — PROGRESS NOTE ADULT - SUBJECTIVE AND OBJECTIVE BOX
Neurology Follow up note    Name  YANETH QUESADA    HPI:  59F s/p R LISA (2/2019) p/w back pain which started earlier this year. Pt believed it to be related to her hip and went to see her PCP. She was diagnozed with anemia of chronic diease- further workup was done in the ER. She underwent a CT spine which demonstarted lytic lesions in spine concerning for metastatci disease. She went to see Dr. Matos for spine consultation who recommended surgery for tumor debulking and posterior spinal fusion. Currently denies numbness and tingilng. Endorses band like pain around mid-torso. No bowel or bladder incontinence. No gait imbalances. Of note, patient is a 34 year 0.5 pack smoker. (09 Jun 2019 13:07)      Interval History - back pain improved - no new UE./ LE weakness        REVIEW OF SYSTEMS    Vital Signs Last 24 Hrs  T(C): 36.6 (19 Jun 2019 05:05), Max: 37 (18 Jun 2019 21:03)  T(F): 97.8 (19 Jun 2019 05:05), Max: 98.6 (18 Jun 2019 21:03)  HR: 124 (19 Jun 2019 05:05) (103 - 124)  BP: 122/79 (19 Jun 2019 05:05) (107/69 - 141/85)  BP(mean): --  RR: 18 (19 Jun 2019 05:05) (15 - 19)  SpO2: 92% (19 Jun 2019 05:05) (92% - 99%)    Physical Exam-     Mental Status- awake and alert    Cranial Nerves- full EOM    Gait and station- n/a    Motor- moves all 4 extremities     Reflexes- decreased    Sensation- no sensory level    Coordination- no tremors    Vascular -    Medications  acetaminophen   Tablet .. 650 milliGRAM(s) Oral every 6 hours PRN  acetaminophen   Tablet .. 975 milliGRAM(s) Oral every 8 hours  albumin human  5% IVPB 250 milliLiter(s) IV Intermittent once  aluminum hydroxide/magnesium hydroxide/simethicone Suspension 30 milliLiter(s) Oral four times a day PRN  atoMOXetine 60 milliGRAM(s) Oral daily  BACItracin   Ointment 1 Application(s) Topical daily  bisacodyl Suppository 10 milliGRAM(s) Rectal daily PRN  BUpivacaine liposome 1.3% Injectable (no eMAR) 20 milliLiter(s) Local Injection once  cyclobenzaprine 5 milliGRAM(s) Oral three times a day PRN  docusate sodium 100 milliGRAM(s) Oral three times a day  famotidine    Tablet 20 milliGRAM(s) Oral every 12 hours  FIRST- Mouthwash  BLM 10 milliLiter(s) Swish and Spit every 6 hours  FLUoxetine 20 milliGRAM(s) Oral daily  heparin  Injectable 5000 Unit(s) SubCutaneous every 8 hours  HYDROmorphone  Injectable 1 milliGRAM(s) IV Push every 2 hours PRN  lidocaine   Patch 2 Patch Transdermal daily  magnesium hydroxide Suspension 30 milliLiter(s) Oral every 12 hours PRN  metoprolol succinate ER 50 milliGRAM(s) Oral daily  multivitamin/minerals 1 Tablet(s) Oral daily  naloxone Injectable 0.1 milliGRAM(s) IV Push every 3 minutes PRN  ondansetron Injectable 4 milliGRAM(s) IV Push every 6 hours PRN  oxyCODONE    IR 5 milliGRAM(s) Oral every 4 hours PRN  oxyCODONE    IR 10 milliGRAM(s) Oral every 4 hours PRN  polyethylene glycol 3350 17 Gram(s) Oral daily  senna 2 Tablet(s) Oral at bedtime      Lab      Radiology    Assessment- Metastatic disease to spine    Plan FU with chemo as OP

## 2019-06-19 NOTE — PROGRESS NOTE ADULT - SUBJECTIVE AND OBJECTIVE BOX
Pain Management Progress Note - [facility] [phone]      HPI: Patient seen and examined today, patient in nad. Patient with x2 month of thoracic spine pain, found to have metastatic disease s/p  tumor debulking at T9-T10 and T2, now s/p PSF C6-T6.   Patient reports neck, thoracic spine and surgical site pain, pain managed with current pain medication regimen.  Patient Axox3, denies n,v, no s/s of oversedation. Reviewed pain medication regimen with patient.       Pain is ___ sharp _x___dull ___burning _x__achy ___ Intensity: ____ mild _x__mod _x__severe     Location __x__surgical site ____cervical _____lumbar ____abd ____upper ext____lower ext  __x_ thoracic pain    Worse with __x__activity _x___movement _____physical therapy___ Rest    Improved with _x___medication _x___rest ____physical therapy        sodium chloride 0.9% Bolus  HYDROmorphone  Injectable  oxyCODONE  ER Tablet  morphine  - Injectable  HYDROmorphone  Injectable  famotidine    Tablet  magnesium hydroxide Suspension  bisacodyl Suppository  docusate sodium  zaleplon  lactated ringers.  FLUoxetine  metoprolol succinate ER  atoMOXetine  morphine  - Injectable  HYDROmorphone PCA (1 mG/mL)  HYDROmorphone PCA (1 mG/mL) Rescue Clinician Bolus  naloxone Injectable  ondansetron Injectable  cyclobenzaprine  lactated ringers.  acetaminophen   Tablet ..  ceFAZolin   IVPB  docusate sodium  magnesium hydroxide Suspension  senna  gentamicin   IVPB  ceFAZolin  Injectable.  albumin human  5% IVPB  lactated ringers.  oxyCODONE    IR  oxyCODONE    IR  HYDROmorphone  Injectable  lidocaine   Patch  BACItracin   Ointment  lactated ringers.  acetaminophen   Tablet ..  oxyCODONE    IR  oxyCODONE    IR  morphine  - Injectable  HYDROmorphone  Injectable  aluminum hydroxide/magnesium hydroxide/simethicone Suspension  ondansetron Injectable  polyethylene glycol 3350  bisacodyl Suppository  senna  docusate sodium  metoprolol succinate ER  zaleplon  FLUoxetine  famotidine    Tablet  cyclobenzaprine  dexmedetomidine Infusion  dexmedetomidine Infusion  gentamicin   IVPB  ceFAZolin  Injectable.  albumin human  5% IVPB  albumin human  5% IVPB  fentaNYL   Infusion  calcium gluconate IVPB  fentaNYL   Infusion  potassium chloride   Powder  lactated ringers Bolus  morphine  - Injectable  acetaminophen   Tablet ..  FIRST- Mouthwash  BLM  metoprolol succinate ER  metoprolol tartrate  HYDROmorphone  Injectable  multivitamin/minerals  oxyCODONE    IR  cyclobenzaprine      ROS: Const:  ___febrile   Eyes:___ENT:___CV: _-__chest pain  Resp: __-__sob  GI:_-__nausea _-__vomiting _-__abd pain ___npo ___clears _x_full diet __bm  :___ Musk: _x__pain _x__spasm  Skin:___ Neuro:  _-__ptbrhuzf_-__ldtwtevke_-__ numbness _-_weakness __x_paresth  Psych:__anxiety  Endo:___ Heme:___Allergy:_________, _x__all others reviewed and negative      06-19 @ 06:4099 mL/min/1.73M2      Hemoglobin: 10.4 g/dL (06-19 @ 06:40)  Hemoglobin: 10.4 g/dL (06-18 @ 08:59)        T(C): 36.8 (06-19-19 @ 08:51), Max: 37 (06-18-19 @ 21:03)  HR: 93 (06-19-19 @ 08:51) (93 - 124)  BP: 111/83 (06-19-19 @ 08:51) (107/69 - 141/85)  RR: 20 (06-19-19 @ 08:51) (15 - 20)  SpO2: 95% (06-19-19 @ 08:51) (92% - 99%)  Wt(kg): --         PHYSICAL EXAM:  Gen Appearance: __x_no acute distress _x__appropriate        Neuro: _x__SILT feet____ EOM Intact Psych: AAOX_3_, _x__mood/affect appropriate        Eyes: __x_conjunctiva WNL  ___x__ Pupils equal and round        ENT: x___ears and nose atraumatic_x__ Hearing grossly intact        Neck: __x_trachea midline, no visible masses ___thyroid without palpable mass    Resp: x___Nml WOB____No tactile fremitus ___clear to auscultation    Cardio: _x__extremities free from edema __x__pedal pulses palpable    GI/Abdomen: __x_soft __x___ Nontender___x___Nondistended_____HSM    Lymphatic: ___no palpable nodes in neck  _x_no palpable nodes calves and feet    Skin/Wound: ___Incision, _x__Dressing c/d/i,   ____surrounding tissues soft,  __x_drain/chest tube present____    Muscular: EHL _5__/5  Gastrocnemius_5__/5    ___absent clubbing/cyanosis        ASSESSMENT: This is a 59y old Female with a history of intractable pain and metastatic disease, scheduled for tumor debulking at T9-T10 and T2, patient now s/p C6-T6, pain managed with current pain medication regimen.      Recommended Treatment PLAN:  1. Oxycodone 5-10mg PO Q4 prn moderate to severe pain  2. Dilaudid 1mg Q2h IVP prn breakthrough pain  3. Flexeril 10mg Po Q8h prn muscle spasms  4. x2 lidocaine patch to affected area, 12hours on 12hours off  Plan discussed with Dr. Bravo Pain Management Progress Note - [facility] [phone]      HPI: Patient seen and examined today, patient in nad. Patient with x2 month of thoracic spine pain, found to have metastatic disease s/p  tumor debulking at T9-T10 and T2, now s/p PSF C6-T6.   Patient reports neck, thoracic spine and surgical site pain, pain managed with current pain medication regimen.  Patient Axox3, denies n,v, no s/s of oversedation. Patient reports abdominal cramps and spasms.  Reviewed pain medication regimen with patient. recommend patient apply lidocaine patches to abdomen and to take Flexeril as needed.       Pain is ___ sharp _x___dull ___burning _x__achy ___ Intensity: ____ mild _x__mod _x__severe     Location __x__surgical site ____cervical _____lumbar ____abd ____upper ext____lower ext  __x_ thoracic pain    Worse with __x__activity _x___movement _____physical therapy___ Rest    Improved with _x___medication _x___rest ____physical therapy        sodium chloride 0.9% Bolus  HYDROmorphone  Injectable  oxyCODONE  ER Tablet  morphine  - Injectable  HYDROmorphone  Injectable  famotidine    Tablet  magnesium hydroxide Suspension  bisacodyl Suppository  docusate sodium  zaleplon  lactated ringers.  FLUoxetine  metoprolol succinate ER  atoMOXetine  morphine  - Injectable  HYDROmorphone PCA (1 mG/mL)  HYDROmorphone PCA (1 mG/mL) Rescue Clinician Bolus  naloxone Injectable  ondansetron Injectable  cyclobenzaprine  lactated ringers.  acetaminophen   Tablet ..  ceFAZolin   IVPB  docusate sodium  magnesium hydroxide Suspension  senna  gentamicin   IVPB  ceFAZolin  Injectable.  albumin human  5% IVPB  lactated ringers.  oxyCODONE    IR  oxyCODONE    IR  HYDROmorphone  Injectable  lidocaine   Patch  BACItracin   Ointment  lactated ringers.  acetaminophen   Tablet ..  oxyCODONE    IR  oxyCODONE    IR  morphine  - Injectable  HYDROmorphone  Injectable  aluminum hydroxide/magnesium hydroxide/simethicone Suspension  ondansetron Injectable  polyethylene glycol 3350  bisacodyl Suppository  senna  docusate sodium  metoprolol succinate ER  zaleplon  FLUoxetine  famotidine    Tablet  cyclobenzaprine  dexmedetomidine Infusion  dexmedetomidine Infusion  gentamicin   IVPB  ceFAZolin  Injectable.  albumin human  5% IVPB  albumin human  5% IVPB  fentaNYL   Infusion  calcium gluconate IVPB  fentaNYL   Infusion  potassium chloride   Powder  lactated ringers Bolus  morphine  - Injectable  acetaminophen   Tablet ..  FIRST- Mouthwash  BLM  metoprolol succinate ER  metoprolol tartrate  HYDROmorphone  Injectable  multivitamin/minerals  oxyCODONE    IR  cyclobenzaprine      ROS: Const:  ___febrile   Eyes:___ENT:___CV: _-__chest pain  Resp: __-__sob  GI:_-__nausea _-__vomiting _-__abd pain ___npo ___clears _x_full diet __bm  :___ Musk: _x__pain _x__spasm  Skin:___ Neuro:  _-__dhmmghbf_-__lncttwmci_-__ numbness _-_weakness __x_paresth  Psych:__anxiety  Endo:___ Heme:___Allergy:_________, _x__all others reviewed and negative      06-19 @ 06:4099 mL/min/1.73M2      Hemoglobin: 10.4 g/dL (06-19 @ 06:40)  Hemoglobin: 10.4 g/dL (06-18 @ 08:59)        T(C): 36.8 (06-19-19 @ 08:51), Max: 37 (06-18-19 @ 21:03)  HR: 93 (06-19-19 @ 08:51) (93 - 124)  BP: 111/83 (06-19-19 @ 08:51) (107/69 - 141/85)  RR: 20 (06-19-19 @ 08:51) (15 - 20)  SpO2: 95% (06-19-19 @ 08:51) (92% - 99%)  Wt(kg): --         PHYSICAL EXAM:  Gen Appearance: __x_no acute distress _x__appropriate        Neuro: _x__SILT feet____ EOM Intact Psych: AAOX_3_, _x__mood/affect appropriate        Eyes: __x_conjunctiva WNL  ___x__ Pupils equal and round        ENT: x___ears and nose atraumatic_x__ Hearing grossly intact        Neck: __x_trachea midline, no visible masses ___thyroid without palpable mass    Resp: x___Nml WOB____No tactile fremitus ___clear to auscultation    Cardio: _x__extremities free from edema __x__pedal pulses palpable    GI/Abdomen: __x_soft __x___ Nontender___x___Nondistended_____HSM    Lymphatic: ___no palpable nodes in neck  _x_no palpable nodes calves and feet    Skin/Wound: ___Incision, _x__Dressing c/d/i,   ____surrounding tissues soft,  __x_drain/chest tube present____    Muscular: EHL _5__/5  Gastrocnemius_5__/5    ___absent clubbing/cyanosis        ASSESSMENT: This is a 59y old Female with a history of intractable pain and metastatic disease, scheduled for tumor debulking at T9-T10 and T2, patient now s/p C6-T6, pain managed with current pain medication regimen.      Recommended Treatment PLAN:  1. Oxycodone 5-10mg PO Q4 prn moderate to severe pain  2. Dilaudid 1mg Q2h IVP prn breakthrough pain  3. Flexeril 10mg Po Q8h prn muscle spasms  4. x2 lidocaine patch to affected area, 12hours on 12hours off  Plan discussed with Dr. Bravo

## 2019-06-19 NOTE — PROGRESS NOTE ADULT - SUBJECTIVE AND OBJECTIVE BOX
Ortho    Procedure: PSF C6-T6 (6/12); Partial corpectomy T10; PSF T7-12 (6/10)   Surgeon: Carlo    Pain controlled this am. Working with PT. Anticipate discharge today   Denies CP, SOB, N/V, numbness/tingling     Vital Signs Last 24 Hrs  T(C): 36.6 (19 Jun 2019 05:05), Max: 37 (18 Jun 2019 21:03)  T(F): 97.8 (19 Jun 2019 05:05), Max: 98.6 (18 Jun 2019 21:03)  HR: 124 (19 Jun 2019 05:05) (103 - 124)  BP: 122/79 (19 Jun 2019 05:05) (107/69 - 141/85)  BP(mean): --  RR: 18 (19 Jun 2019 05:05) (15 - 19)  SpO2: 92% (19 Jun 2019 05:05) (92% - 99%)    General: Pt Alert and oriented, NAD  Neck and back DSG C/D/I, dsg c/d/i   	paracervical tenderness L > R   	L D/B 4+, T/WE/WF/ strength 5  	R D/B/T/WE/WF/  strength 5   	SILT C5-T1 bilat   	2+ rad bilat                 A/P: 59yFemale s/p PSF C6-T6 (6/12); Partial corpectomy T10; PSF T7-12 (6/10)   - Stable  - Pain Control  - DVT ppx: SCDs  - PT, WBS: WBAT  - dispo: discharge to home anticipated today     Ortho Pager [phone]

## 2019-06-19 NOTE — PROGRESS NOTE ADULT - ASSESSMENT
60yo woman with a PMH of SBO, R LISA and newly diagnosed, unspecified metastatic cancer with lytic bone lesions who presented for spine surgery in the setting of lytic lesions and epidural invasion at T10.  Now s/p PSF C6-T6 (6/12), partial corpectomy at T10 and  PSF at T7-12 (6/10).  Post-op course c/b pain and tachycardia.     1) Sinus tachycardia, likely 2/2 deconditioning and pain --now much improved.   * C/w Toprol XL to 50mg daily    2) HTN,  * c/w Toprol XL to 50mg daily    3) Metastatic spinal disease  s/p debulking sx.    4) Post-op state  * OOB-C  * Physical therapy evaluation  * Aggressive incentive spirometry  * Pain control and bowel regimen  * Mechanical LE ppx     5) VTE ppx.   *  SCDs and ambulation    Optimized for discharge

## 2019-06-20 ENCOUNTER — TRANSCRIPTION ENCOUNTER (OUTPATIENT)
Age: 59
End: 2019-06-20

## 2019-06-20 LAB
ANION GAP SERPL CALC-SCNC: 14 MMOL/L — SIGNIFICANT CHANGE UP (ref 5–17)
BUN SERPL-MCNC: 9 MG/DL — SIGNIFICANT CHANGE UP (ref 7–23)
CALCIUM SERPL-MCNC: 9.6 MG/DL — SIGNIFICANT CHANGE UP (ref 8.4–10.5)
CHLORIDE SERPL-SCNC: 99 MMOL/L — SIGNIFICANT CHANGE UP (ref 96–108)
CO2 SERPL-SCNC: 25 MMOL/L — SIGNIFICANT CHANGE UP (ref 22–31)
CREAT SERPL-MCNC: 0.67 MG/DL — SIGNIFICANT CHANGE UP (ref 0.5–1.3)
GLUCOSE SERPL-MCNC: 123 MG/DL — HIGH (ref 70–99)
HCT VFR BLD CALC: 34.2 % — LOW (ref 34.5–45)
HGB BLD-MCNC: 10.9 G/DL — LOW (ref 11.5–15.5)
MCHC RBC-ENTMCNC: 30.1 PG — SIGNIFICANT CHANGE UP (ref 27–34)
MCHC RBC-ENTMCNC: 31.9 GM/DL — LOW (ref 32–36)
MCV RBC AUTO: 94.5 FL — SIGNIFICANT CHANGE UP (ref 80–100)
NRBC # BLD: 0 /100 WBCS — SIGNIFICANT CHANGE UP (ref 0–0)
PLATELET # BLD AUTO: 469 K/UL — HIGH (ref 150–400)
POTASSIUM SERPL-MCNC: 4.2 MMOL/L — SIGNIFICANT CHANGE UP (ref 3.5–5.3)
POTASSIUM SERPL-SCNC: 4.2 MMOL/L — SIGNIFICANT CHANGE UP (ref 3.5–5.3)
RBC # BLD: 3.62 M/UL — LOW (ref 3.8–5.2)
RBC # FLD: 17.4 % — HIGH (ref 10.3–14.5)
SODIUM SERPL-SCNC: 138 MMOL/L — SIGNIFICANT CHANGE UP (ref 135–145)
WBC # BLD: 14.35 K/UL — HIGH (ref 3.8–10.5)
WBC # FLD AUTO: 14.35 K/UL — HIGH (ref 3.8–10.5)

## 2019-06-20 PROCEDURE — 74176 CT ABD & PELVIS W/O CONTRAST: CPT | Mod: 26

## 2019-06-20 PROCEDURE — 99233 SBSQ HOSP IP/OBS HIGH 50: CPT

## 2019-06-20 RX ORDER — DIAZEPAM 5 MG
2 TABLET ORAL ONCE
Refills: 0 | Status: DISCONTINUED | OUTPATIENT
Start: 2019-06-20 | End: 2019-06-20

## 2019-06-20 RX ORDER — ACETAMINOPHEN 500 MG
2 TABLET ORAL
Qty: 0 | Refills: 0 | DISCHARGE
Start: 2019-06-20

## 2019-06-20 RX ORDER — HEPARIN SODIUM 5000 [USP'U]/ML
5000 INJECTION INTRAVENOUS; SUBCUTANEOUS
Qty: 42 | Refills: 0
Start: 2019-06-20 | End: 2019-07-03

## 2019-06-20 RX ORDER — OXYCODONE HYDROCHLORIDE 5 MG/1
1 TABLET ORAL
Qty: 40 | Refills: 0
Start: 2019-06-20 | End: 2019-06-26

## 2019-06-20 RX ORDER — CYCLOBENZAPRINE HYDROCHLORIDE 10 MG/1
1 TABLET, FILM COATED ORAL
Qty: 21 | Refills: 0
Start: 2019-06-20 | End: 2019-06-26

## 2019-06-20 RX ORDER — LIDOCAINE 4 G/100G
1 CREAM TOPICAL
Qty: 0 | Refills: 0 | DISCHARGE

## 2019-06-20 RX ADMIN — Medication 20 MILLIGRAM(S): at 11:32

## 2019-06-20 RX ADMIN — Medication 100 MILLIGRAM(S): at 06:02

## 2019-06-20 RX ADMIN — HYDROMORPHONE HYDROCHLORIDE 1 MILLIGRAM(S): 2 INJECTION INTRAMUSCULAR; INTRAVENOUS; SUBCUTANEOUS at 16:30

## 2019-06-20 RX ADMIN — Medication 1 TABLET(S): at 11:32

## 2019-06-20 RX ADMIN — DIPHENHYDRAMINE HYDROCHLORIDE AND LIDOCAINE HYDROCHLORIDE AND ALUMINUM HYDROXIDE AND MAGNESIUM HYDRO 10 MILLILITER(S): KIT at 11:38

## 2019-06-20 RX ADMIN — HYDROMORPHONE HYDROCHLORIDE 1 MILLIGRAM(S): 2 INJECTION INTRAMUSCULAR; INTRAVENOUS; SUBCUTANEOUS at 19:00

## 2019-06-20 RX ADMIN — HEPARIN SODIUM 5000 UNIT(S): 5000 INJECTION INTRAVENOUS; SUBCUTANEOUS at 06:01

## 2019-06-20 RX ADMIN — HYDROMORPHONE HYDROCHLORIDE 1 MILLIGRAM(S): 2 INJECTION INTRAMUSCULAR; INTRAVENOUS; SUBCUTANEOUS at 06:26

## 2019-06-20 RX ADMIN — OXYCODONE HYDROCHLORIDE 10 MILLIGRAM(S): 5 TABLET ORAL at 22:41

## 2019-06-20 RX ADMIN — HYDROMORPHONE HYDROCHLORIDE 1 MILLIGRAM(S): 2 INJECTION INTRAMUSCULAR; INTRAVENOUS; SUBCUTANEOUS at 16:00

## 2019-06-20 RX ADMIN — OXYCODONE HYDROCHLORIDE 10 MILLIGRAM(S): 5 TABLET ORAL at 22:11

## 2019-06-20 RX ADMIN — Medication 975 MILLIGRAM(S): at 06:01

## 2019-06-20 RX ADMIN — OXYCODONE HYDROCHLORIDE 10 MILLIGRAM(S): 5 TABLET ORAL at 04:00

## 2019-06-20 RX ADMIN — DIPHENHYDRAMINE HYDROCHLORIDE AND LIDOCAINE HYDROCHLORIDE AND ALUMINUM HYDROXIDE AND MAGNESIUM HYDRO 10 MILLILITER(S): KIT at 17:57

## 2019-06-20 RX ADMIN — OXYCODONE HYDROCHLORIDE 10 MILLIGRAM(S): 5 TABLET ORAL at 17:58

## 2019-06-20 RX ADMIN — Medication 975 MILLIGRAM(S): at 22:09

## 2019-06-20 RX ADMIN — OXYCODONE HYDROCHLORIDE 10 MILLIGRAM(S): 5 TABLET ORAL at 12:31

## 2019-06-20 RX ADMIN — FAMOTIDINE 20 MILLIGRAM(S): 10 INJECTION INTRAVENOUS at 22:10

## 2019-06-20 RX ADMIN — HEPARIN SODIUM 5000 UNIT(S): 5000 INJECTION INTRAVENOUS; SUBCUTANEOUS at 22:10

## 2019-06-20 RX ADMIN — LIDOCAINE 2 PATCH: 4 CREAM TOPICAL at 19:40

## 2019-06-20 RX ADMIN — HYDROMORPHONE HYDROCHLORIDE 1 MILLIGRAM(S): 2 INJECTION INTRAMUSCULAR; INTRAVENOUS; SUBCUTANEOUS at 18:20

## 2019-06-20 RX ADMIN — OXYCODONE HYDROCHLORIDE 10 MILLIGRAM(S): 5 TABLET ORAL at 11:31

## 2019-06-20 RX ADMIN — Medication 1 APPLICATION(S): at 11:27

## 2019-06-20 RX ADMIN — HYDROMORPHONE HYDROCHLORIDE 1 MILLIGRAM(S): 2 INJECTION INTRAMUSCULAR; INTRAVENOUS; SUBCUTANEOUS at 12:55

## 2019-06-20 RX ADMIN — Medication 975 MILLIGRAM(S): at 14:30

## 2019-06-20 RX ADMIN — SENNA PLUS 2 TABLET(S): 8.6 TABLET ORAL at 22:10

## 2019-06-20 RX ADMIN — Medication 100 MILLIGRAM(S): at 22:10

## 2019-06-20 RX ADMIN — ATOMOXETINE HYDROCHLORIDE 60 MILLIGRAM(S): 10 CAPSULE ORAL at 12:57

## 2019-06-20 RX ADMIN — Medication 975 MILLIGRAM(S): at 06:26

## 2019-06-20 RX ADMIN — LIDOCAINE 2 PATCH: 4 CREAM TOPICAL at 11:32

## 2019-06-20 RX ADMIN — OXYCODONE HYDROCHLORIDE 10 MILLIGRAM(S): 5 TABLET ORAL at 16:58

## 2019-06-20 RX ADMIN — Medication 975 MILLIGRAM(S): at 15:00

## 2019-06-20 RX ADMIN — HYDROMORPHONE HYDROCHLORIDE 1 MILLIGRAM(S): 2 INJECTION INTRAMUSCULAR; INTRAVENOUS; SUBCUTANEOUS at 06:01

## 2019-06-20 RX ADMIN — HYDROMORPHONE HYDROCHLORIDE 1 MILLIGRAM(S): 2 INJECTION INTRAMUSCULAR; INTRAVENOUS; SUBCUTANEOUS at 13:25

## 2019-06-20 RX ADMIN — FAMOTIDINE 20 MILLIGRAM(S): 10 INJECTION INTRAVENOUS at 10:23

## 2019-06-20 RX ADMIN — Medication 50 MILLIGRAM(S): at 06:02

## 2019-06-20 RX ADMIN — HEPARIN SODIUM 5000 UNIT(S): 5000 INJECTION INTRAVENOUS; SUBCUTANEOUS at 14:31

## 2019-06-20 RX ADMIN — Medication 975 MILLIGRAM(S): at 22:39

## 2019-06-20 RX ADMIN — OXYCODONE HYDROCHLORIDE 10 MILLIGRAM(S): 5 TABLET ORAL at 03:35

## 2019-06-20 RX ADMIN — POLYETHYLENE GLYCOL 3350 17 GRAM(S): 17 POWDER, FOR SOLUTION ORAL at 11:32

## 2019-06-20 RX ADMIN — Medication 100 MILLIGRAM(S): at 14:30

## 2019-06-20 RX ADMIN — Medication 2 MILLIGRAM(S): at 07:11

## 2019-06-20 NOTE — PROGRESS NOTE ADULT - NSHPATTENDINGPLANDISCUSS_GEN_ALL_CORE
Ortho
Ortho NP
Ortho PA
patient, family at bedside and primary.
primary team and patient.

## 2019-06-20 NOTE — PROGRESS NOTE ADULT - SUBJECTIVE AND OBJECTIVE BOX
Neurology Follow up note    Name  YANETH QUESADA    HPI:  59F s/p R LISA (2/2019) p/w back pain which started earlier this year. Pt believed it to be related to her hip and went to see her PCP. She was diagnozed with anemia of chronic diease- further workup was done in the ER. She underwent a CT spine which demonstarted lytic lesions in spine concerning for metastatci disease. She went to see Dr. Matos for spine consultation who recommended surgery for tumor debulking and posterior spinal fusion. Currently denies numbness and tingilng. Endorses band like pain around mid-torso. No bowel or bladder incontinence. No gait imbalances. Of note, patient is a 34 year 0.5 pack smoker. (09 Jun 2019 13:07)      Interval History - continued back pain and radicular symptoms in the LE        REVIEW OF SYSTEMS    Vital Signs Last 24 Hrs  T(C): 36.4 (20 Jun 2019 09:30), Max: 36.7 (19 Jun 2019 20:45)  T(F): 97.6 (20 Jun 2019 09:30), Max: 98.1 (20 Jun 2019 05:44)  HR: 106 (20 Jun 2019 16:09) (72 - 118)  BP: 117/83 (20 Jun 2019 16:09) (98/62 - 118/79)  BP(mean): --  RR: 19 (20 Jun 2019 16:09) (18 - 22)  SpO2: 99% (20 Jun 2019 16:09) (94% - 100%)    Physical Exam-     Mental Status- awake and alert    Cranial Nerves- full EOM    Gait and station- n/a    Motor- moves all 4 extremities    Reflexes- decreased    Sensation- no sensory level    Coordination- no tremors    Vascular - no bruits    Medications  acetaminophen   Tablet .. 650 milliGRAM(s) Oral every 6 hours PRN  acetaminophen   Tablet .. 975 milliGRAM(s) Oral every 8 hours  albumin human  5% IVPB 250 milliLiter(s) IV Intermittent once  aluminum hydroxide/magnesium hydroxide/simethicone Suspension 30 milliLiter(s) Oral four times a day PRN  atoMOXetine 60 milliGRAM(s) Oral daily  BACItracin   Ointment 1 Application(s) Topical daily  bisacodyl Suppository 10 milliGRAM(s) Rectal daily PRN  BUpivacaine liposome 1.3% Injectable (no eMAR) 20 milliLiter(s) Local Injection once  cyclobenzaprine 10 milliGRAM(s) Oral three times a day PRN  docusate sodium 100 milliGRAM(s) Oral three times a day  famotidine    Tablet 20 milliGRAM(s) Oral every 12 hours  FIRST- Mouthwash  BLM 10 milliLiter(s) Swish and Spit every 6 hours  FLUoxetine 20 milliGRAM(s) Oral daily  heparin  Injectable 5000 Unit(s) SubCutaneous every 8 hours  HYDROmorphone  Injectable 1 milliGRAM(s) IV Push every 2 hours PRN  lidocaine   Patch 2 Patch Transdermal daily  magnesium hydroxide Suspension 30 milliLiter(s) Oral every 12 hours PRN  metoprolol succinate ER 50 milliGRAM(s) Oral daily  multivitamin/minerals 1 Tablet(s) Oral daily  naloxone Injectable 0.1 milliGRAM(s) IV Push every 3 minutes PRN  ondansetron Injectable 4 milliGRAM(s) IV Push every 6 hours PRN  oxyCODONE    IR 5 milliGRAM(s) Oral every 4 hours PRN  oxyCODONE    IR 10 milliGRAM(s) Oral every 4 hours PRN  polyethylene glycol 3350 17 Gram(s) Oral daily  senna 2 Tablet(s) Oral at bedtime      Lab      Radiology    Assessment- Thoracic and Lumbar mets     Plan as per ortho and oncology

## 2019-06-20 NOTE — PROGRESS NOTE ADULT - SUBJECTIVE AND OBJECTIVE BOX
Ortho    Procedure: PSF C6-T6 (6/12); Partial corpectomy T10; PSF T7-12 (6/10)   Surgeon: Carlo    Pt seen and evaluated this am.   Pt in pain/anxiety this am. Counseled at bedside.   Working with PT. Anticipate discharge today   Denies CP, SOB, N/V, numbness/tingling     Vital Signs Last 24 Hrs  T(C): 36.4 (20 Jun 2019 09:30), Max: 36.7 (19 Jun 2019 20:45)  T(F): 97.6 (20 Jun 2019 09:30), Max: 98.1 (20 Jun 2019 05:44)  HR: 72 (20 Jun 2019 09:30) (72 - 118)  BP: 98/62 (20 Jun 2019 09:30) (98/62 - 117/76)  BP(mean): --  RR: 18 (20 Jun 2019 09:30) (18 - 18)  SpO2: 95% (20 Jun 2019 09:30) (94% - 97%)    General: Pt Alert and oriented, NAD  Neck and back DSG C/D/I, dsg c/d/i   	paracervical tenderness L > R   	L D/B 4+, T/WE/WF/ strength 5  	R D/B/T/WE/WF/  strength 5   	SILT C5-T1 bilat   	2+ rad bilat                 A/P: 59yFemale s/p PSF C6-T6 (6/12); Partial corpectomy T10; PSF T7-12 (6/10)   - Stable  - Pain Control  - DVT ppx: SCDs  - PT, WBS: WBAT  - dispo: discharge to home   Ortho Pager [phone]

## 2019-06-20 NOTE — PROGRESS NOTE ADULT - SUBJECTIVE AND OBJECTIVE BOX
CC: No overnight events.  Feeling well, pain is overall controlled.  Tolerates PO diet (+); Urination (++); Walked with PT (+)  Denies cp, sob, dizziness, HA, abdominal pain, n/v.  Rest of ROS negative.     Vital Signs Last 24 Hrs  T(C): 36.4 (20 Jun 2019 09:30), Max: 36.7 (19 Jun 2019 20:45)  T(F): 97.6 (20 Jun 2019 09:30), Max: 98.1 (20 Jun 2019 05:44)  HR: 72 (20 Jun 2019 09:30) (72 - 118)  BP: 98/62 (20 Jun 2019 09:30) (98/62 - 117/76)  BP(mean): --  RR: 18 (20 Jun 2019 09:30) (18 - 18)  SpO2: 95% (20 Jun 2019 09:30) (94% - 97%)    PHYSICAL EXAMINATION  * General: Not in acute distress. Awake and alert. Lying comfortably in bed.  * Head: Normocephalic, atraumatic.  * HEENT: ears no discharge, eyes PERRLA, nose no discharge, throat no exudates, normal tonsils.  * Neck: no JVD, supple.  * Lungs: Clear to auscultation, no rales, no wheezes.  * Cardio: Regular rate and rhythm, no murmurs, no rubs, no gallops. Good peripheral pulses.  * Abdomen: Soft, non-tender, non-distended, tympanic to percussion, no rebound, no guarding, no rigidity. Bowel sounds present. No suprapubic or CVA tenderness.  * : Deferred.  * Extremities: Acyanotic, no edema.  * Skin: Warm and dry.  * Neuro: Alert and oriented x 3. No focal deficits. Motor strength is 5/5 throughout. Sensation intact. Cranial nerves II-XII grossly intact.                           10.9   14.35 )-----------( 469      ( 20 Jun 2019 06:54 )             34.2   06-20    138  |  99  |  9   ----------------------------<  123<H>  4.2   |  25  |  0.67    Ca    9.6      20 Jun 2019 06:54    MEDICATIONS  (STANDING):  acetaminophen   Tablet .. 975 milliGRAM(s) Oral every 8 hours  albumin human  5% IVPB 250 milliLiter(s) IV Intermittent once  atoMOXetine 60 milliGRAM(s) Oral daily  BACItracin   Ointment 1 Application(s) Topical daily  BUpivacaine liposome 1.3% Injectable (no eMAR) 20 milliLiter(s) Local Injection once  docusate sodium 100 milliGRAM(s) Oral three times a day  famotidine    Tablet 20 milliGRAM(s) Oral every 12 hours  FIRST- Mouthwash  BLM 10 milliLiter(s) Swish and Spit every 6 hours  FLUoxetine 20 milliGRAM(s) Oral daily  heparin  Injectable 5000 Unit(s) SubCutaneous every 8 hours  lidocaine   Patch 2 Patch Transdermal daily  metoprolol succinate ER 50 milliGRAM(s) Oral daily  multivitamin/minerals 1 Tablet(s) Oral daily  polyethylene glycol 3350 17 Gram(s) Oral daily  senna 2 Tablet(s) Oral at bedtime    MEDICATIONS  (PRN):  acetaminophen   Tablet .. 650 milliGRAM(s) Oral every 6 hours PRN Temp greater or equal to 38C (100.4F)  aluminum hydroxide/magnesium hydroxide/simethicone Suspension 30 milliLiter(s) Oral four times a day PRN Indigestion  bisacodyl Suppository 10 milliGRAM(s) Rectal daily PRN If no bowel movement by POD#2  cyclobenzaprine 10 milliGRAM(s) Oral three times a day PRN Muscle Spasm  HYDROmorphone  Injectable 1 milliGRAM(s) IV Push every 2 hours PRN breakthrough pain  magnesium hydroxide Suspension 30 milliLiter(s) Oral every 12 hours PRN Constipation  naloxone Injectable 0.1 milliGRAM(s) IV Push every 3 minutes PRN For ANY of the following changes in patient status:  A. RR LESS THAN 10 breaths per minute, B. Oxygen saturation LESS THAN 90%, C. Sedation score of 6  ondansetron Injectable 4 milliGRAM(s) IV Push every 6 hours PRN Nausea  oxyCODONE    IR 5 milliGRAM(s) Oral every 4 hours PRN Mild Pain (1 - 3)  oxyCODONE    IR 10 milliGRAM(s) Oral every 4 hours PRN Moderate Pain (4 - 6)

## 2019-06-20 NOTE — DISCHARGE NOTE NURSING/CASE MANAGEMENT/SOCIAL WORK - NSDCDPATPORTLINK_GEN_ALL_CORE
You can access the [facility] Portal, offered by [facility], by registering with the following website: [email or web address]

## 2019-06-20 NOTE — DISCHARGE NOTE NURSING/CASE MANAGEMENT/SOCIAL WORK - NSDCFUADDAPPT_GEN_ALL_CORE_FT
Follow up with [facility] for oncology treatment of cancer with thoracic spine metastasis. Dr. Matos approved start of chemotherapy for week of 6/24/19.  Bring CD with spine imaging for oncology appointment and medical records from [facility].  Outpatient PET scan and brain MRI recommended by oncology team at [facility]

## 2019-06-20 NOTE — PROGRESS NOTE ADULT - SUBJECTIVE AND OBJECTIVE BOX
Pain Management Progress Note - [facility] [phone]      HPI: Patient seen and examined today, patient in nad. Patient with x2 month of thoracic spine pain, found to have metastatic disease s/p  tumor debulking at T9-T10 and T2, now s/p PSF C6-T6.   Patient reports neck, thoracic spine and surgical site pain, pain managed with current pain medication regimen.  Patient Axox3, denies n,v, no s/s of oversedation. Patient reports abdominal cramps and spasms addressed with lidocaine patches to abdomen.         Pain is ___ sharp _x___dull ___burning _x__achy ___ Intensity: ____ mild _x__mod _x__severe     Location __x__surgical site ____cervical _____lumbar ____abd ____upper ext____lower ext  __x_ thoracic pain    Worse with __x__activity _x___movement _____physical therapy___ Rest    Improved with _x___medication _x___rest ____physical therapy        sodium chloride 0.9% Bolus  HYDROmorphone  Injectable  oxyCODONE    IR  oxyCODONE    IR  oxyCODONE  ER Tablet  morphine  - Injectable  HYDROmorphone  Injectable  famotidine    Tablet  magnesium hydroxide Suspension  bisacodyl Suppository  docusate sodium  zaleplon  lactated ringers.  FLUoxetine  metoprolol succinate ER  atoMOXetine  HYDROmorphone PCA (1 mG/mL)  HYDROmorphone PCA (1 mG/mL) Rescue Clinician Bolus  naloxone Injectable  ondansetron Injectable  cyclobenzaprine  BUpivacaine liposome 1.3% Injectable (no eMAR)  lactated ringers.  acetaminophen   Tablet ..  ceFAZolin   IVPB  docusate sodium  magnesium hydroxide Suspension  senna  gentamicin   IVPB  ceFAZolin  Injectable.  albumin human  5% IVPB  ceFAZolin  Injectable.  lactated ringers.  oxyCODONE    IR  oxyCODONE    IR  HYDROmorphone  Injectable  lidocaine   Patch  BACItracin   Ointment  lactated ringers.  acetaminophen   Tablet ..  oxyCODONE    IR  oxyCODONE    IR  morphine  - Injectable  HYDROmorphone  Injectable  aluminum hydroxide/magnesium hydroxide/simethicone Suspension  ondansetron Injectable  polyethylene glycol 3350  bisacodyl Suppository  senna  docusate sodium  metoprolol succinate ER  zaleplon  FLUoxetine  famotidine    Tablet  cyclobenzaprine  dexmedetomidine Infusion  dexmedetomidine Infusion  gentamicin   IVPB  ceFAZolin  Injectable.  albumin human  5% IVPB  albumin human  5% IVPB  fentaNYL   Infusion  calcium gluconate IVPB  fentaNYL   Infusion  (ADM OVERRIDE)  potassium chloride   Powder  (ADM OVERRIDE)  lactated ringers Bolus  morphine  - Injectable  acetaminophen   Tablet ..  heparin  Injectable  FIRST- Mouthwash  BLM  metoprolol succinate ER  metoprolol tartrate  HYDROmorphone  Injectable  multivitamin/minerals  oxyCODONE    IR  oxyCODONE    IR  cyclobenzaprine  diazepam    Tablet      ROS: Const:  ___febrile   Eyes:___ENT:___CV: _-__chest pain  Resp: __-__sob  GI:_-__nausea _-__vomiting _-__abd pain ___npo ___clears _x_full diet __bm  :___ Musk: _x__pain _x__spasm  Skin:___ Neuro:  _-__fdxfvzkn_-__ygjeuktsk_-__ numbness _-_weakness __x_paresth  Psych:__anxiety  Endo:___ Heme:___Allergy:_________, _x__all others reviewed and negative      06-20 @ 06:5496 mL/min/1.73M2      Hemoglobin: 10.9 g/dL (06-20 @ 06:54)  Hemoglobin: 10.4 g/dL (06-19 @ 06:40)        T(C): 36.4 (06-20-19 @ 09:30), Max: 36.7 (06-19-19 @ 20:45)  HR: 72 (06-20-19 @ 09:30) (72 - 118)  BP: 98/62 (06-20-19 @ 09:30) (98/62 - 117/76)  RR: 18 (06-20-19 @ 09:30) (18 - 18)  SpO2: 95% (06-20-19 @ 09:30) (94% - 97%)  Wt(kg): --       PHYSICAL EXAM:  Gen Appearance: __x_no acute distress _x__appropriate        Neuro: _x__SILT feet____ EOM Intact Psych: AAOX_3_, _x__mood/affect appropriate        Eyes: __x_conjunctiva WNL  ___x__ Pupils equal and round        ENT: x___ears and nose atraumatic_x__ Hearing grossly intact        Neck: __x_trachea midline, no visible masses ___thyroid without palpable mass    Resp: x___Nml WOB____No tactile fremitus ___clear to auscultation    Cardio: _x__extremities free from edema __x__pedal pulses palpable    GI/Abdomen: __x_soft __x___ Nontender___x___Nondistended_____HSM    Lymphatic: ___no palpable nodes in neck  _x_no palpable nodes calves and feet    Skin/Wound: ___Incision, _x__Dressing c/d/i,   ____surrounding tissues soft,  __x_drain/chest tube present____    Muscular: EHL _5__/5  Gastrocnemius_5__/5    ___absent clubbing/cyanosis        ASSESSMENT: This is a 59y old Female with a history of intractable pain and metastatic disease, scheduled for tumor debulking at T9-T10 and T2, patient now s/p C6-T6, pain managed with current pain medication regimen.      Recommended Treatment PLAN:  1. Oxycodone 5-10mg PO Q4 prn moderate to severe pain  2. Dilaudid 1mg Q2h IVP prn breakthrough pain  3. Flexeril 10mg Po Q8h prn muscle spasms  4. x2 lidocaine patch to affected area, 12hours on 12hours off  Plan discussed with Dr. Bravo

## 2019-06-20 NOTE — PROGRESS NOTE ADULT - SUBJECTIVE AND OBJECTIVE BOX
ORTHO NOTE    [x ] Pt seen/examined at 7:30am with Dr. Matos and Dr. Grace with  present.  Patient and family agreed that pain was tolerable for discharge and wanted to hire private RN and physical therapy.  Posterior dressing removed.  Incision cdi with no drainage or erythema.  Dr. Matos wants sutures open to air.  5/5 motor ehl/fhl/gs/ta. Equipment, prescriptions and private hire services secured.    Addendum 1600: patient unable to log roll and sit up secondary to pain control.  Patient c/o severe throbbing right iliac crest pain and improved after IV diluadid administration.  Dr. Posada aware and recommends CT abd pelvis no contrast to review interval changes.  Encouraged patient to take oxycodone 10mg po tablets with more frequency and prior to movement.  Continue hospitalization for pain management control. suqH q 8 hours

## 2019-06-21 VITALS
TEMPERATURE: 99 F | SYSTOLIC BLOOD PRESSURE: 108 MMHG | HEART RATE: 109 BPM | OXYGEN SATURATION: 96 % | DIASTOLIC BLOOD PRESSURE: 72 MMHG | RESPIRATION RATE: 18 BRPM

## 2019-06-21 PROCEDURE — P9045: CPT

## 2019-06-21 PROCEDURE — 84295 ASSAY OF SERUM SODIUM: CPT

## 2019-06-21 PROCEDURE — 81001 URINALYSIS AUTO W/SCOPE: CPT

## 2019-06-21 PROCEDURE — 71045 X-RAY EXAM CHEST 1 VIEW: CPT

## 2019-06-21 PROCEDURE — 93005 ELECTROCARDIOGRAM TRACING: CPT

## 2019-06-21 PROCEDURE — 97535 SELF CARE MNGMENT TRAINING: CPT

## 2019-06-21 PROCEDURE — 85610 PROTHROMBIN TIME: CPT

## 2019-06-21 PROCEDURE — 97116 GAIT TRAINING THERAPY: CPT

## 2019-06-21 PROCEDURE — 84132 ASSAY OF SERUM POTASSIUM: CPT

## 2019-06-21 PROCEDURE — 86803 HEPATITIS C AB TEST: CPT

## 2019-06-21 PROCEDURE — 97163 PT EVAL HIGH COMPLEX 45 MIN: CPT

## 2019-06-21 PROCEDURE — 74176 CT ABD & PELVIS W/O CONTRAST: CPT

## 2019-06-21 PROCEDURE — 80170 ASSAY OF GENTAMICIN: CPT

## 2019-06-21 PROCEDURE — 85027 COMPLETE CBC AUTOMATED: CPT

## 2019-06-21 PROCEDURE — 88341 IMHCHEM/IMCYTCHM EA ADD ANTB: CPT

## 2019-06-21 PROCEDURE — 87641 MR-STAPH DNA AMP PROBE: CPT

## 2019-06-21 PROCEDURE — 85730 THROMBOPLASTIN TIME PARTIAL: CPT

## 2019-06-21 PROCEDURE — 80048 BASIC METABOLIC PNL TOTAL CA: CPT

## 2019-06-21 PROCEDURE — 97162 PT EVAL MOD COMPLEX 30 MIN: CPT

## 2019-06-21 PROCEDURE — C1713: CPT

## 2019-06-21 PROCEDURE — 76000 FLUOROSCOPY <1 HR PHYS/QHP: CPT

## 2019-06-21 PROCEDURE — 82803 BLOOD GASES ANY COMBINATION: CPT

## 2019-06-21 PROCEDURE — 95940 IONM IN OPERATNG ROOM 15 MIN: CPT

## 2019-06-21 PROCEDURE — 80053 COMPREHEN METABOLIC PANEL: CPT

## 2019-06-21 PROCEDURE — 99285 EMERGENCY DEPT VISIT HI MDM: CPT

## 2019-06-21 PROCEDURE — 84443 ASSAY THYROID STIM HORMONE: CPT

## 2019-06-21 PROCEDURE — 71275 CT ANGIOGRAPHY CHEST: CPT

## 2019-06-21 PROCEDURE — 88307 TISSUE EXAM BY PATHOLOGIST: CPT

## 2019-06-21 PROCEDURE — 97530 THERAPEUTIC ACTIVITIES: CPT

## 2019-06-21 PROCEDURE — 82330 ASSAY OF CALCIUM: CPT

## 2019-06-21 PROCEDURE — 36430 TRANSFUSION BLD/BLD COMPNT: CPT

## 2019-06-21 PROCEDURE — 83735 ASSAY OF MAGNESIUM: CPT

## 2019-06-21 PROCEDURE — 84100 ASSAY OF PHOSPHORUS: CPT

## 2019-06-21 PROCEDURE — 86900 BLOOD TYPING SEROLOGIC ABO: CPT

## 2019-06-21 PROCEDURE — 74174 CTA ABD&PLVS W/CONTRAST: CPT

## 2019-06-21 PROCEDURE — 86901 BLOOD TYPING SEROLOGIC RH(D): CPT

## 2019-06-21 PROCEDURE — 36415 COLL VENOUS BLD VENIPUNCTURE: CPT

## 2019-06-21 PROCEDURE — P9016: CPT

## 2019-06-21 PROCEDURE — 85025 COMPLETE CBC W/AUTO DIFF WBC: CPT

## 2019-06-21 PROCEDURE — C1889: CPT

## 2019-06-21 PROCEDURE — 72170 X-RAY EXAM OF PELVIS: CPT

## 2019-06-21 PROCEDURE — 88311 DECALCIFY TISSUE: CPT

## 2019-06-21 PROCEDURE — 86923 COMPATIBILITY TEST ELECTRIC: CPT

## 2019-06-21 PROCEDURE — 85018 HEMOGLOBIN: CPT

## 2019-06-21 PROCEDURE — 88331 PATH CONSLTJ SURG 1 BLK 1SPC: CPT

## 2019-06-21 PROCEDURE — 88305 TISSUE EXAM BY PATHOLOGIST: CPT

## 2019-06-21 PROCEDURE — 82962 GLUCOSE BLOOD TEST: CPT

## 2019-06-21 PROCEDURE — 88304 TISSUE EXAM BY PATHOLOGIST: CPT

## 2019-06-21 PROCEDURE — 86850 RBC ANTIBODY SCREEN: CPT

## 2019-06-21 PROCEDURE — 88360 TUMOR IMMUNOHISTOCHEM/MANUAL: CPT

## 2019-06-21 RX ADMIN — HYDROMORPHONE HYDROCHLORIDE 1 MILLIGRAM(S): 2 INJECTION INTRAMUSCULAR; INTRAVENOUS; SUBCUTANEOUS at 11:39

## 2019-06-21 RX ADMIN — HYDROMORPHONE HYDROCHLORIDE 1 MILLIGRAM(S): 2 INJECTION INTRAMUSCULAR; INTRAVENOUS; SUBCUTANEOUS at 01:35

## 2019-06-21 RX ADMIN — FAMOTIDINE 20 MILLIGRAM(S): 10 INJECTION INTRAVENOUS at 09:03

## 2019-06-21 RX ADMIN — DIPHENHYDRAMINE HYDROCHLORIDE AND LIDOCAINE HYDROCHLORIDE AND ALUMINUM HYDROXIDE AND MAGNESIUM HYDRO 10 MILLILITER(S): KIT at 00:16

## 2019-06-21 RX ADMIN — Medication 1 TABLET(S): at 11:40

## 2019-06-21 RX ADMIN — OXYCODONE HYDROCHLORIDE 10 MILLIGRAM(S): 5 TABLET ORAL at 10:00

## 2019-06-21 RX ADMIN — DIPHENHYDRAMINE HYDROCHLORIDE AND LIDOCAINE HYDROCHLORIDE AND ALUMINUM HYDROXIDE AND MAGNESIUM HYDRO 10 MILLILITER(S): KIT at 06:21

## 2019-06-21 RX ADMIN — Medication 100 MILLIGRAM(S): at 06:20

## 2019-06-21 RX ADMIN — Medication 975 MILLIGRAM(S): at 06:20

## 2019-06-21 RX ADMIN — Medication 20 MILLIGRAM(S): at 11:40

## 2019-06-21 RX ADMIN — LIDOCAINE 2 PATCH: 4 CREAM TOPICAL at 11:40

## 2019-06-21 RX ADMIN — OXYCODONE HYDROCHLORIDE 10 MILLIGRAM(S): 5 TABLET ORAL at 03:13

## 2019-06-21 RX ADMIN — DIPHENHYDRAMINE HYDROCHLORIDE AND LIDOCAINE HYDROCHLORIDE AND ALUMINUM HYDROXIDE AND MAGNESIUM HYDRO 10 MILLILITER(S): KIT at 11:40

## 2019-06-21 RX ADMIN — LIDOCAINE 2 PATCH: 4 CREAM TOPICAL at 00:05

## 2019-06-21 RX ADMIN — OXYCODONE HYDROCHLORIDE 10 MILLIGRAM(S): 5 TABLET ORAL at 09:03

## 2019-06-21 RX ADMIN — Medication 975 MILLIGRAM(S): at 06:55

## 2019-06-21 RX ADMIN — HYDROMORPHONE HYDROCHLORIDE 1 MILLIGRAM(S): 2 INJECTION INTRAMUSCULAR; INTRAVENOUS; SUBCUTANEOUS at 12:15

## 2019-06-21 RX ADMIN — OXYCODONE HYDROCHLORIDE 10 MILLIGRAM(S): 5 TABLET ORAL at 02:53

## 2019-06-21 RX ADMIN — HEPARIN SODIUM 5000 UNIT(S): 5000 INJECTION INTRAVENOUS; SUBCUTANEOUS at 06:20

## 2019-06-21 RX ADMIN — HYDROMORPHONE HYDROCHLORIDE 1 MILLIGRAM(S): 2 INJECTION INTRAMUSCULAR; INTRAVENOUS; SUBCUTANEOUS at 01:20

## 2019-06-21 RX ADMIN — Medication 50 MILLIGRAM(S): at 06:20

## 2019-06-21 RX ADMIN — HYDROMORPHONE HYDROCHLORIDE 1 MILLIGRAM(S): 2 INJECTION INTRAMUSCULAR; INTRAVENOUS; SUBCUTANEOUS at 05:08

## 2019-06-21 RX ADMIN — HYDROMORPHONE HYDROCHLORIDE 1 MILLIGRAM(S): 2 INJECTION INTRAMUSCULAR; INTRAVENOUS; SUBCUTANEOUS at 06:16

## 2019-06-21 NOTE — PROGRESS NOTE ADULT - SUBJECTIVE AND OBJECTIVE BOX
Neurology Follow up note    Name  YANETH QUESADA    HPI:  59F s/p R LISA (2/2019) p/w back pain which started earlier this year. Pt believed it to be related to her hip and went to see her PCP. She was diagnozed with anemia of chronic diease- further workup was done in the ER. She underwent a CT spine which demonstarted lytic lesions in spine concerning for metastatci disease. She went to see Dr. Matos for spine consultation who recommended surgery for tumor debulking and posterior spinal fusion. Currently denies numbness and tingilng. Endorses band like pain around mid-torso. No bowel or bladder incontinence. No gait imbalances. Of note, patient is a 34 year 0.5 pack smoker. (09 Jun 2019 13:07)      Interval History - no new weakness with pain in the hip        REVIEW OF SYSTEMS    Vital Signs Last 24 Hrs  T(C): 37 (21 Jun 2019 05:02), Max: 37 (21 Jun 2019 05:02)  T(F): 98.6 (21 Jun 2019 05:02), Max: 98.6 (21 Jun 2019 05:02)  HR: 109 (21 Jun 2019 05:02) (72 - 114)  BP: 108/72 (21 Jun 2019 05:02) (96/68 - 118/79)  BP(mean): --  RR: 18 (21 Jun 2019 05:02) (12 - 22)  SpO2: 96% (21 Jun 2019 05:02) (94% - 100%)    Physical Exam-     Mental Status- awake and alert    Cranial Nerves- full EOM    Gait and station- n/a    Motor- moves all 4 extremities    Reflexes- decreased    Sensation- no sensory level    Coordination- no tremors    Vascular - no bruits    Medications  acetaminophen   Tablet .. 650 milliGRAM(s) Oral every 6 hours PRN  acetaminophen   Tablet .. 975 milliGRAM(s) Oral every 8 hours  albumin human  5% IVPB 250 milliLiter(s) IV Intermittent once  aluminum hydroxide/magnesium hydroxide/simethicone Suspension 30 milliLiter(s) Oral four times a day PRN  atoMOXetine 60 milliGRAM(s) Oral daily  BACItracin   Ointment 1 Application(s) Topical daily  bisacodyl Suppository 10 milliGRAM(s) Rectal daily PRN  BUpivacaine liposome 1.3% Injectable (no eMAR) 20 milliLiter(s) Local Injection once  cyclobenzaprine 10 milliGRAM(s) Oral three times a day PRN  docusate sodium 100 milliGRAM(s) Oral three times a day  famotidine    Tablet 20 milliGRAM(s) Oral every 12 hours  FIRST- Mouthwash  BLM 10 milliLiter(s) Swish and Spit every 6 hours  FLUoxetine 20 milliGRAM(s) Oral daily  heparin  Injectable 5000 Unit(s) SubCutaneous every 8 hours  HYDROmorphone  Injectable 1 milliGRAM(s) IV Push every 2 hours PRN  lidocaine   Patch 2 Patch Transdermal daily  magnesium hydroxide Suspension 30 milliLiter(s) Oral every 12 hours PRN  metoprolol succinate ER 50 milliGRAM(s) Oral daily  multivitamin/minerals 1 Tablet(s) Oral daily  naloxone Injectable 0.1 milliGRAM(s) IV Push every 3 minutes PRN  ondansetron Injectable 4 milliGRAM(s) IV Push every 6 hours PRN  oxyCODONE    IR 5 milliGRAM(s) Oral every 4 hours PRN  oxyCODONE    IR 10 milliGRAM(s) Oral every 4 hours PRN  polyethylene glycol 3350 17 Gram(s) Oral daily  senna 2 Tablet(s) Oral at bedtime      Lab      Radiology    Assessment- Thoracic Lumbar radiculopathy    Plan as per DR CARRINGTON

## 2019-06-21 NOTE — PROGRESS NOTE ADULT - REASON FOR ADMISSION
Thoracic back pain, metastatic spine lesions, spine instability, cord compression
back pain
Thoracic back pain, metastatic spine lesions, spine instability, cord compression
back pain

## 2019-06-21 NOTE — PROGRESS NOTE ADULT - SUBJECTIVE AND OBJECTIVE BOX
Pain Management Progress Note - [facility] [phone]      HPI: Patient seen and examined today, patient in nad. Patient with x2 month of thoracic spine pain, found to have metastatic disease s/p  tumor debulking at T9-T10 and T2, now s/p PSF C6-T6.   Patient reports neck, thoracic spine and surgical site pain. Patient reported severe abdominal spasms and pain yesterday evening, pain controlled overnight with Oxycodone 10mg PO. Axox3, denies n,v, no s/s of oversedation. Reviewed pain medication regimen at bedside with Dr. Handy.         Pain is ___ sharp _x___dull ___burning _x__achy ___ Intensity: ____ mild _x__mod _x__severe     Location __x__surgical site ____cervical _____lumbar ____abd ____upper ext____lower ext  __x_ thoracic pain    Worse with __x__activity _x___movement _____physical therapy___ Rest    Improved with _x___medication _x___rest ____physical therapy      sodium chloride 0.9% Bolus  HYDROmorphone  Injectable  oxyCODONE    IR  oxyCODONE    IR  oxyCODONE  ER Tablet  morphine  - Injectable  HYDROmorphone  Injectable  famotidine    Tablet  magnesium hydroxide Suspension  bisacodyl Suppository  docusate sodium  zaleplon  lactated ringers.  FLUoxetine  metoprolol succinate ER  atoMOXetine  morphine  - Injectable  HYDROmorphone PCA (1 mG/mL)  HYDROmorphone PCA (1 mG/mL) Rescue Clinician Bolus  naloxone Injectable  ondansetron Injectable  cyclobenzaprine  BUpivacaine liposome 1.3% Injectable (no eMAR)  lactated ringers.  acetaminophen   Tablet ..  ceFAZolin   IVPB  docusate sodium  magnesium hydroxide Suspension  senna  gentamicin   IVPB  ceFAZolin  Injectable.  albumin human  5% IVPB  ceFAZolin  Injectable.  lactated ringers.  oxyCODONE    IR  oxyCODONE    IR  HYDROmorphone  Injectable  lidocaine   Patch  lactated ringers.  acetaminophen   Tablet ..  oxyCODONE    IR  oxyCODONE    IR  morphine  - Injectable  HYDROmorphone  Injectable  aluminum hydroxide/magnesium hydroxide/simethicone Suspension  ondansetron Injectable  polyethylene glycol 3350  bisacodyl Suppository  senna  docusate sodium  metoprolol succinate ER  zaleplon  FLUoxetine  famotidine    Tablet  cyclobenzaprine  dexmedetomidine Infusion  dexmedetomidine Infusion  gentamicin   IVPB  ceFAZolin  Injectable.  albumin human  5% IVPB  albumin human  5% IVPB  fentaNYL   Infusion  calcium gluconate IVPB  fentaNYL   Infusion  potassium chloride   Powder  lactated ringers Bolus  morphine  - Injectabl  heparin  Injectable  FIRST- Mouthwash  BLM  metoprolol succinate ER  metoprolol tartrate  HYDROmorphone  Injectable  multivitamin/minerals  oxyCODONE    IR  oxyCODONE    IR  cyclobenzaprine  diazepam    Tablet      ROS: Const:  ___febrile   Eyes:___ENT:___CV: _-__chest pain  Resp: __-__sob  GI:_-__nausea _-__vomiting _-__abd pain ___npo ___clears _x_full diet __bm  :___ Musk: _x__pain _x__spasm  Skin:___ Neuro:  _-__buhnxmfa_-__qmmklfine_-__ numbness _-_weakness __x_paresth  Psych:__anxiety  Endo:___ Heme:___Allergy:_________, _x__all others reviewed and negative      Hemoglobin: 10.9 g/dL (06-20 @ 06:54)        T(C): 37 (06-21-19 @ 05:02), Max: 37 (06-21-19 @ 05:02)  HR: 109 (06-21-19 @ 05:02) (72 - 114)  BP: 108/72 (06-21-19 @ 05:02) (96/68 - 118/79)  RR: 18 (06-21-19 @ 05:02) (12 - 22)  SpO2: 96% (06-21-19 @ 05:02) (94% - 100%)  Wt(kg): --        PHYSICAL EXAM:  Gen Appearance: __x_no acute distress _x__appropriate        Neuro: _x__SILT feet____ EOM Intact Psych: AAOX_3_, _x__mood/affect appropriate        Eyes: __x_conjunctiva WNL  ___x__ Pupils equal and round        ENT: x___ears and nose atraumatic_x__ Hearing grossly intact        Neck: __x_trachea midline, no visible masses ___thyroid without palpable mass    Resp: x___Nml WOB____No tactile fremitus ___clear to auscultation    Cardio: _x__extremities free from edema __x__pedal pulses palpable    GI/Abdomen: __x_soft __x___ Nontender___x___Nondistended_____HSM    Lymphatic: ___no palpable nodes in neck  _x_no palpable nodes calves and feet    Skin/Wound: ___Incision, _x__Dressing c/d/i,   ____surrounding tissues soft,  __x_drain/chest tube present____    Muscular: EHL _5__/5  Gastrocnemius_5__/5    ___absent clubbing/cyanosis        ASSESSMENT: This is a 59y old Female with a history of intractable pain and metastatic disease, scheduled for tumor debulking at T9-T10 and T2, patient now s/p C6-T6, pain better managed with Oxycodone 10mg PO.      Recommended Treatment PLAN:  1. Oxycodone 10mg PO Q4 prn moderate to severe pain  2. Dilaudid 1mg Q2h IVP prn breakthrough pain  3. Flexeril 10mg Po Q8h prn muscle spasms  4. x2 lidocaine patch to affected area, 12hours on 12hours off  5. Can escalate to Oxycodone 10-15mg PO Q4h prn moderate to severe pain if pain not addressed with current regimen  Plan discussed with Dr. Handy at bedside

## 2019-06-21 NOTE — PROGRESS NOTE ADULT - SUBJECTIVE AND OBJECTIVE BOX
Ortho    Procedure: PSF C6-T6 (6/12); Partial corpectomy T10; PSF T7-12 (6/10)   Surgeon: Carlo    Pt seen and evaluated this am. Pain controlled this am. Pt comfortable.   Working with PT. Anticipate discharge today   Denies CP, SOB, N/V, numbness/tingling     Vital Signs Last 24 Hrs  T(C): 37 (21 Jun 2019 05:02), Max: 37 (21 Jun 2019 05:02)  T(F): 98.6 (21 Jun 2019 05:02), Max: 98.6 (21 Jun 2019 05:02)  HR: 109 (21 Jun 2019 05:02) (104 - 114)  BP: 108/72 (21 Jun 2019 05:02) (96/68 - 118/79)  BP(mean): --  RR: 18 (21 Jun 2019 05:02) (12 - 22)  SpO2: 96% (21 Jun 2019 05:02) (94% - 100%)    General: Pt Alert and oriented, NAD  Neck and back DSG C/D/I   	paracervical tenderness L > R   	L D/B 4+, T/WE/WF/ strength 5  	R D/B/T/WE/WF/  strength 5   	SILT C5-T1 bilat   	2+ rad bilat                 A/P: 59yFemale s/p PSF C6-T6 (6/12); Partial corpectomy T10; PSF T7-12 (6/10)   - Stable  - Pain Control  - DVT ppx: SCDs  - PT, WBS: WBAT  - dispo: discharge to home   Ortho Pager [phone]

## 2019-06-22 RX ORDER — OXYCODONE HYDROCHLORIDE 5 MG/1
1 TABLET ORAL
Qty: 10 | Refills: 0
Start: 2019-06-22

## 2019-06-22 RX ORDER — HYDROMORPHONE HYDROCHLORIDE 2 MG/ML
1 INJECTION INTRAMUSCULAR; INTRAVENOUS; SUBCUTANEOUS
Qty: 20 | Refills: 0
Start: 2019-06-22

## 2019-06-23 RX ORDER — HYDROMORPHONE HYDROCHLORIDE 2 MG/ML
1 INJECTION INTRAMUSCULAR; INTRAVENOUS; SUBCUTANEOUS
Qty: 80 | Refills: 0
Start: 2019-06-23

## 2019-06-24 DIAGNOSIS — I10 ESSENTIAL (PRIMARY) HYPERTENSION: ICD-10-CM

## 2019-06-24 DIAGNOSIS — F41.9 ANXIETY DISORDER, UNSPECIFIED: ICD-10-CM

## 2019-06-24 DIAGNOSIS — M19.90 UNSPECIFIED OSTEOARTHRITIS, UNSPECIFIED SITE: ICD-10-CM

## 2019-06-24 DIAGNOSIS — C79.49 SECONDARY MALIGNANT NEOPLASM OF OTHER PARTS OF NERVOUS SYSTEM: ICD-10-CM

## 2019-06-24 DIAGNOSIS — Z87.891 PERSONAL HISTORY OF NICOTINE DEPENDENCE: ICD-10-CM

## 2019-06-24 DIAGNOSIS — G89.3 NEOPLASM RELATED PAIN (ACUTE) (CHRONIC): ICD-10-CM

## 2019-06-24 DIAGNOSIS — R00.0 TACHYCARDIA, UNSPECIFIED: ICD-10-CM

## 2019-06-24 DIAGNOSIS — M51.14 INTERVERTEBRAL DISC DISORDERS WITH RADICULOPATHY, THORACIC REGION: ICD-10-CM

## 2019-06-24 DIAGNOSIS — M54.9 DORSALGIA, UNSPECIFIED: ICD-10-CM

## 2019-06-24 DIAGNOSIS — G99.2 MYELOPATHY IN DISEASES CLASSIFIED ELSEWHERE: ICD-10-CM

## 2019-06-24 DIAGNOSIS — C79.51 SECONDARY MALIGNANT NEOPLASM OF BONE: ICD-10-CM

## 2019-06-24 DIAGNOSIS — D62 ACUTE POSTHEMORRHAGIC ANEMIA: ICD-10-CM

## 2019-06-24 DIAGNOSIS — G95.89 OTHER SPECIFIED DISEASES OF SPINAL CORD: ICD-10-CM

## 2019-06-24 DIAGNOSIS — K13.79 OTHER LESIONS OF ORAL MUCOSA: ICD-10-CM

## 2019-06-24 DIAGNOSIS — G95.20 UNSPECIFIED CORD COMPRESSION: ICD-10-CM

## 2019-06-24 DIAGNOSIS — D63.8 ANEMIA IN OTHER CHRONIC DISEASES CLASSIFIED ELSEWHERE: ICD-10-CM

## 2019-06-24 DIAGNOSIS — C34.90 MALIGNANT NEOPLASM OF UNSPECIFIED PART OF UNSPECIFIED BRONCHUS OR LUNG: ICD-10-CM

## 2019-06-24 RX ORDER — OXYCODONE HYDROCHLORIDE 5 MG/1
1 TABLET ORAL
Qty: 60 | Refills: 0
Start: 2019-06-24 | End: 2019-07-03

## 2019-06-24 RX ORDER — HYDROMORPHONE HYDROCHLORIDE 2 MG/ML
1 INJECTION INTRAMUSCULAR; INTRAVENOUS; SUBCUTANEOUS
Qty: 80 | Refills: 0
Start: 2019-06-24

## 2020-05-21 NOTE — OCCUPATIONAL THERAPY INITIAL EVALUATION ADULT - LIGHT TOUCH SENSATION, RUE, REHAB EVAL
Subjective   Naomi Alexander is a 50 y.o. female.   You have chosen to receive care through a telehealth visit.  Do you consent to use a video/audio connection for your medical care today? Yes    History of Present Illness   Naomi Alexander 50 y.o. female presents for evaluation of a rash involving the right axilla. Rash has been present for: 1 month. Lesions are red, and are described as pustular and vesicular. Rash has not changed over time. Rash is pruritic. Associated symptoms  .only  itches.  Patient denies:fever..  Treatment to date includes: OTC antifungal cream without improvement. Symptoms are stable. Patient has not had contacts with similar rash. Patient has not had new exposures (soaps, lotions, laundry detergents, foods, medications, plants, insects or animals).  Rash comes and goes;---never goes away and flares up again   The following portions of the patient's history were reviewed and updated as appropriate: allergies, current medications, past family history, past medical history, past social history, past surgical history and problem list.    Review of Systems   Constitutional: Negative for activity change, fever and unexpected weight change.   Respiratory: Negative for shortness of breath and wheezing.    Cardiovascular: Negative for chest pain.   Gastrointestinal: Negative for abdominal pain.   Musculoskeletal: Negative for joint swelling.   Skin: Positive for rash.   Neurological: Negative for speech difficulty.   Psychiatric/Behavioral: Negative for confusion and suicidal ideas.       Objective   Physical Exam   Constitutional: She is oriented to person, place, and time. She appears well-developed and well-nourished. No distress.   HENT:   Head: Normocephalic and atraumatic.   Right Ear: External ear normal.   Left Ear: External ear normal.   Eyes: Conjunctivae are normal. Right eye exhibits no discharge. Left eye exhibits no discharge. No scleral icterus.   Neck: Normal range of  motion.   Pulmonary/Chest: Effort normal. No stridor. No respiratory distress.   Abdominal: Soft. There is no guarding.   Musculoskeletal: Normal range of motion.   Neurological: She is alert and oriented to person, place, and time. Coordination normal.   Skin: Skin is dry. Rash noted. She is not diaphoretic.   Right axilla-----anterior area 1cm ---curved; red; vesicular; no spreading onto skin;    Psychiatric: She has a normal mood and affect. Her behavior is normal. Judgment and thought content normal.       Assessment/Plan   Naomi was seen today for rash.    Diagnoses and all orders for this visit:    Rash and nonspecific skin eruption    Other orders  -     nystatin-triamcinolone (MYCOLOG II) 019899-5.1 UNIT/GM-% cream; Apply  topically to the appropriate area as directed 2 (Two) Times a Day. PRN Rash      Try Mycolog  Use a week past clear; refer to derm if worse or no help  Time spent 10 minutes      within normal limits

## 2021-11-17 NOTE — ED PROVIDER NOTE - CARDIAC, MLM
Recommended observation. Normal rate, regular rhythm.  Heart sounds S1, S2.  No murmurs, rubs or gallops.

## 2021-12-01 NOTE — ED PROVIDER NOTE - ENMT NEGATIVE STATEMENT, MLM
[email or web address] Faxed over request for liver bx report and slide to be sent to our office. I have faxed request x3 only to receive kidney bx report. . Faxed again asking for liver bx report and slides. (EVON)---1157 Rec'd hospital records/labs/Echo but still no liver bx report. I have left message with patient daughter asking if patient had a liver bx or not--left voice message--patient speaks Cuban. (EVON)---1230 Spoke w/patient daughter at this time patient is inpatient at [facility] [place] will be discharge to a Rehab facility. Patient only had a kidney bx not a liver bx. Alli Cates a message letting him know. (EVON) Ears: no ear pain and no hearing problems.Nose: no nasal congestion and no nasal drainage.Mouth/Throat: no dysphagia, no hoarseness and no throat pain.Neck: no lumps, no pain, no stiffness and no swollen glands.

## 2022-01-05 NOTE — PROGRESS NOTE ADULT - SUBJECTIVE AND OBJECTIVE BOX
Interval history:  Seen this morning. Feels ok. Still with back pain but tolerable.    60 yo F with hx of hysterectomy­ tubal pregnancy, hip replacement in Feb 2019­ bone cyst initially seen by me on 5/20/19 after found to have high ferritin levels on outside labs. Work up including HH, MM, flow all negative. On 6/3/19 follow up continued to complain of bilateral flank pain. Point tenderness noted in mid-thoracic area. Pt sent for CT of C/A/P- positive for spine/ rib/ iliac lesions. Tumor markers elevated for breast ca (no breast mass however). Due to CT findings pt sent to ortho spine out of concern for impending vertebral fractures. Now admitted for surgery which was performed on 6/10/19. This morning continues to have pain in back. Able to communicate adequately. Daughter by bedside.     Past medical history:  As above    Past surg history:  As above    Social history:  Smoker- approx 10 per day, - lives with  in [facility], no durgs, alcohol socially    Family history:   No fam hx of ca    Home Medications:  atomoxetine 60 mg oral capsule: 1 cap(s) orally once a day (in the morning) (09 Jun 2019 19:24)  FLUoxetine 20 mg oral tablet: 1 tab(s) orally once a day (09 Jun 2019 19:24)  metoprolol succinate 25 mg oral tablet, extended release: 1 tab(s) orally once a day (09 Jun 2019 19:23)    Allergies  No Known Allergies    Exam:  Vital Signs Last 24 Hrs  T(C): 36.6 (15 Fareed 2019 09:42), Max: 37.6 (14 Jun 2019 14:14)  T(F): 97.8 (15 Afreed 2019 09:42), Max: 99.6 (14 Jun 2019 14:14)  HR: 105 (15 Fareed 2019 09:42) (105 - 129)  BP: 115/68 (15 Fareed 2019 09:42) (99/62 - 143/79)  BP(mean): --  RR: 17 (15 Fareed 2019 09:42) (16 - 18)  SpO2: 93% (15 Fareed 2019 09:42) (93% - 99%)    AAOx3, somewhat uncomfortable, NAD  No scleral icterus  No pallor  Midline incision on back- no oozing or bleeding this AM  Clear to auscultation  Tachycardic  + pulses, equal  No edema    Labs:  WBC 13, Hgb 9.4, plt 166  Cr normal. Liver normal.     Path: Adenocarcinoma of lung origin     Imaging:  CT of C/A/P reviewed Satisfactory

## 2022-04-14 NOTE — ED ADULT TRIAGE NOTE - NS ED NOTE TRAVEL COUNTRIES
[facility] Body Location Override (Optional - Billing Will Still Be Based On Selected Body Map Location If Applicable): right inferior nostril Detail Level: Detailed Size Of Lesion In Cm (Optional): 0.4 X Size Of Lesion In Cm (Optional): 0.3 Body Location Override (Optional - Billing Will Still Be Based On Selected Body Map Location If Applicable): Right Nasolabial Fold Size Of Lesion In Cm (Optional): 0

## 2023-01-09 NOTE — ED ADULT NURSE NOTE - CAS DISCH CONDITION
Kathy Hudson  1975       Date of Initial Treatment Plan: 1/9/23  Date of Current Treatment Plan: 01/09/23    Treatment Plan Number 1    Strengths/Personal Resources for Self Care: employed, intelligent, loves her family    Diagnosis:   1  Adjustment disorder with mixed anxiety and depressed mood        2  Family conflict            Area of Needs: maintain progress, referral to marriage counseling      Long Term Goal 1: "to better express how I feel, no longer harbor anger and disappointment"    Target Date: 4/9/23  Completion Date: TBD         Short Term Objectives for Goal 1: see objectives below  1 1  Merlene High will continue to demonstrate openness to engage in therapy as evidenced by regular attendance and communication of her thoughts and feelings  1 2  Merlene High will continue to express her thoughts, feelings, wants, and needs to her supports  Share successes and challenges  1 3  Merlene High will continue to process past incidents triggering depression and anxiety symptoms, beginning to verbalize healing, acceptance, and a healthier identify of herself  Share successes and challenges  1 4  Merlene High will be referred to marriage counseling with goal to improve communication with her , work through unresolved past issues continuing to effect their relationship  1 5   Merlene High will remain compliant with psychiatric medication provided by PCP  Ask questions and concerns as they arise  GOAL 1: Modality: Individual 1-2x per month   Completion Date TBD, Medication Management and The person(s) responsible for carrying out the plan is  Shahnaz Alberts  Modalities: CBT, psychoeducation, mindfulness, communication skills, assertiveness skills, DBT skills, trauma informed treatment, emotional needs meeting  Behavioral Health Treatment Plan [facility]: Diagnosis and Treatment Plan explained to Chato Minor relates understanding diagnosis and is agreeable to Treatment Plan  Client Comments : Please share your thoughts, feelings, need and/or experiences regarding your treatment plan:     Sandeep Miller, 1975, actively participated in the creation of this treatment plan during a virtual session, using the [employer]  Sandeep Miller  provided verbal consent on 1/9/2023 at 26 PM  The treatment plan was transcribed into the [employer] Record at a later time  Stable

## 2024-03-04 NOTE — PHYSICAL THERAPY INITIAL EVALUATION ADULT - RANGE OF MOTION EXAMINATION, REHAB EVAL
[facility]  Ambulatory Care Coordination to Inpatient Care Management   Hand-In Communication    Date:  March 4, 2024  Name: Regis Felipe is enrolled in [facility] Care Coordination program and I am the Lead Care Coordinator.  CC Contact Information:.   Payor Source: Payor: [facility] / Plan: [facility] DUAL / Product Type: HMO /   Current services in place:     Please see the CC Sn[place]ot and Care Management Flowsheets for specific details of this Regis Felipe care plan.   Additional details/specific concerns r/t this admission:    No additional concerns at this time .    I will follow this admission in Epic. Please feel free to contact me with questions or for further collaboration in discharge planning.      Covering for Raquel RNCC:    MARJORIE Workman, RN, PHN, [facility]  Care Coordinator[place] [address]  [email or web address]   [email or web address]     Office: [phone]   Fax: [phone]      
no ROM deficits were identified/except for spinal ROM due to precautions

## 2024-04-19 NOTE — PRE-OP CHECKLIST - RESPIRATORY RATE (BREATHS/MIN)
Patient states she is unable to do may 28th or June 10th and would like to just cancel procedure.   
17
16